# Patient Record
Sex: FEMALE
[De-identification: names, ages, dates, MRNs, and addresses within clinical notes are randomized per-mention and may not be internally consistent; named-entity substitution may affect disease eponyms.]

---

## 2019-03-01 PROBLEM — Z00.00 ENCOUNTER FOR PREVENTIVE HEALTH EXAMINATION: Status: ACTIVE | Noted: 2019-03-01

## 2019-10-07 ENCOUNTER — RESULT REVIEW (OUTPATIENT)
Age: 69
End: 2019-10-07

## 2019-11-21 ENCOUNTER — RESULT REVIEW (OUTPATIENT)
Age: 69
End: 2019-11-21

## 2020-10-29 ENCOUNTER — RESULT REVIEW (OUTPATIENT)
Age: 70
End: 2020-10-29

## 2021-09-21 ENCOUNTER — LABORATORY RESULT (OUTPATIENT)
Age: 71
End: 2021-09-21

## 2021-09-21 ENCOUNTER — APPOINTMENT (OUTPATIENT)
Dept: OTOLARYNGOLOGY | Facility: CLINIC | Age: 71
End: 2021-09-21
Payer: MEDICARE

## 2021-09-21 DIAGNOSIS — Z78.9 OTHER SPECIFIED HEALTH STATUS: ICD-10-CM

## 2021-09-21 DIAGNOSIS — Z83.0 FAMILY HISTORY OF HUMAN IMMUNODEFICIENCY VIRUS [HIV] DISEASE: ICD-10-CM

## 2021-09-21 DIAGNOSIS — Z86.39 PERSONAL HISTORY OF OTHER ENDOCRINE, NUTRITIONAL AND METABOLIC DISEASE: ICD-10-CM

## 2021-09-21 DIAGNOSIS — Z85.3 PERSONAL HISTORY OF MALIGNANT NEOPLASM OF BREAST: ICD-10-CM

## 2021-09-21 DIAGNOSIS — Z80.0 FAMILY HISTORY OF MALIGNANT NEOPLASM OF DIGESTIVE ORGANS: ICD-10-CM

## 2021-09-21 PROCEDURE — 99204 OFFICE O/P NEW MOD 45 MIN: CPT | Mod: 25

## 2021-09-21 PROCEDURE — 69100 BIOPSY OF EXTERNAL EAR: CPT

## 2021-09-21 RX ORDER — FENOFIBRATE 48 MG/1
48 TABLET ORAL
Qty: 30 | Refills: 0 | Status: ACTIVE | COMMUNITY
Start: 2021-06-04

## 2021-09-21 RX ORDER — INSULIN GLARGINE 100 [IU]/ML
100 INJECTION, SOLUTION SUBCUTANEOUS
Qty: 15 | Refills: 0 | Status: ACTIVE | COMMUNITY
Start: 2021-08-24

## 2021-09-21 RX ORDER — LEVOTHYROXINE SODIUM 0.07 MG/1
75 TABLET ORAL
Qty: 30 | Refills: 0 | Status: ACTIVE | COMMUNITY
Start: 2021-09-10

## 2021-09-21 NOTE — PROCEDURE
[FreeTextEntry1] : Biopsy L. ear mass [FreeTextEntry2] : L. ear mass concerning for recurrent BCC vs. SCC [FreeTextEntry3] : After patient gave consent, the area of concern was anesthetized with 1% Lidocaine with 1:100K epinephrine.  A punch biopsy was performed and sent to Pathology.  Hemostasis was obtained with silver nitrate.  Patient tolerated the procedure well.

## 2021-09-21 NOTE — PHYSICAL EXAM
[de-identified] : L. external ear with significant recurrence of disease involving essentially the entire auricle and extending into the EAC. [Midline] : trachea located in midline position [Normal] : no rashes

## 2021-09-21 NOTE — HISTORY OF PRESENT ILLNESS
[de-identified] : pt is referred by Dr. Elizondo for recurrent left ear basal cell carcinoma and who completed RT 1/2020 by Dr. Tobin. pt stated was healed for one year and  pt states the left ear started draining about few months, pt was given antibiotic cream and no improved. pt c.o worsen of hearing and itching and pain radiate to the neck.  pt admits to see Dr. Tobin 2 months who may had done culture, But no recently biopsy or Ct.

## 2021-09-28 ENCOUNTER — APPOINTMENT (OUTPATIENT)
Dept: CT IMAGING | Facility: CLINIC | Age: 71
End: 2021-09-28

## 2021-10-04 ENCOUNTER — APPOINTMENT (OUTPATIENT)
Dept: CT IMAGING | Facility: CLINIC | Age: 71
End: 2021-10-04

## 2021-10-04 ENCOUNTER — OUTPATIENT (OUTPATIENT)
Dept: OUTPATIENT SERVICES | Facility: HOSPITAL | Age: 71
LOS: 1 days | End: 2021-10-04

## 2021-10-04 DIAGNOSIS — C44.219 BASAL CELL CARCINOMA OF SKIN OF LEFT EAR AND EXTERNAL AURICULAR CANAL: ICD-10-CM

## 2021-10-04 PROCEDURE — 70490 CT SOFT TISSUE NECK W/O DYE: CPT | Mod: 26

## 2021-10-08 ENCOUNTER — APPOINTMENT (OUTPATIENT)
Dept: NUCLEAR MEDICINE | Facility: CLINIC | Age: 71
End: 2021-10-08
Payer: MEDICARE

## 2021-10-08 ENCOUNTER — OUTPATIENT (OUTPATIENT)
Dept: OUTPATIENT SERVICES | Facility: HOSPITAL | Age: 71
LOS: 1 days | End: 2021-10-08

## 2021-10-08 DIAGNOSIS — C44.219 BASAL CELL CARCINOMA OF SKIN OF LEFT EAR AND EXTERNAL AURICULAR CANAL: ICD-10-CM

## 2021-10-08 PROCEDURE — 78815 PET IMAGE W/CT SKULL-THIGH: CPT | Mod: 26,PI

## 2021-10-12 ENCOUNTER — APPOINTMENT (OUTPATIENT)
Dept: OTOLARYNGOLOGY | Facility: CLINIC | Age: 71
End: 2021-10-12
Payer: MEDICARE

## 2021-10-12 VITALS
WEIGHT: 238 LBS | SYSTOLIC BLOOD PRESSURE: 144 MMHG | HEIGHT: 61 IN | BODY MASS INDEX: 44.93 KG/M2 | DIASTOLIC BLOOD PRESSURE: 65 MMHG | TEMPERATURE: 98 F | HEART RATE: 79 BPM

## 2021-10-12 PROCEDURE — 99215 OFFICE O/P EST HI 40 MIN: CPT

## 2021-10-12 NOTE — DATA REVIEWED
[de-identified] : PET/CT not read yet.  On my review, there is an avid L. auricular mass involving the L. parotid and possible LAD in the left level II/V.

## 2021-10-12 NOTE — PHYSICAL EXAM
[Midline] : trachea located in midline position [Normal] : no rashes [de-identified] : L. external ear with significant recurrence of disease involving essentially the entire auricle and extending into the EAC.

## 2021-10-12 NOTE — REASON FOR VISIT
[Subsequent Evaluation] : a subsequent evaluation for [FreeTextEntry2] : f/u for L ear basal cell carcinoma

## 2021-10-12 NOTE — HISTORY OF PRESENT ILLNESS
[de-identified] : Pt is referred by Dr. Elizondo for recurrent left ear basal cell carcinoma and who completed RT 1/2020 by Dr. Tobin. pt stated was healed for one year and pt states the left ear started draining about few months, pt was given antibiotic cream and no improved. pt c.o worsen of hearing and itching and pain radiate to the neck. She underwent a biopsy at last visit and now returns after imaging to discuss further management.

## 2021-10-12 NOTE — CONSULT LETTER
[Dear  ___] : Dear  [unfilled], [Courtesy Letter:] : I had the pleasure of seeing your patient, [unfilled], in my office today. [Please see my note below.] : Please see my note below. [Consult Closing:] : Thank you very much for allowing me to participate in the care of this patient.  If you have any questions, please do not hesitate to contact me. [Sincerely,] : Sincerely, [FreeTextEntry2] : Dr. Yoana Lopez\par [address], \[place] [address] [FreeTextEntry3] : Dev\par \par Noel Park MD FACS\par Division of Head and Neck Surgery\par Department of Otolaryngology \par [facility]\par \par  of Otolaryngology\par Assistant Professor of Surgery\par [employer] [facility]

## 2021-10-26 ENCOUNTER — APPOINTMENT (OUTPATIENT)
Dept: OTOLARYNGOLOGY | Facility: CLINIC | Age: 71
End: 2021-10-26

## 2021-10-28 ENCOUNTER — NON-APPOINTMENT (OUTPATIENT)
Age: 71
End: 2021-10-28

## 2021-10-29 ENCOUNTER — INPATIENT (INPATIENT)
Facility: HOSPITAL | Age: 71
LOS: 12 days | Discharge: NURSING, REHAB OR LONG-TERM CARE | End: 2021-11-11
Attending: OTOLARYNGOLOGY | Admitting: OTOLARYNGOLOGY
Payer: MEDICARE

## 2021-10-29 VITALS
OXYGEN SATURATION: 100 % | SYSTOLIC BLOOD PRESSURE: 179 MMHG | HEART RATE: 71 BPM | TEMPERATURE: 98 F | RESPIRATION RATE: 16 BRPM | DIASTOLIC BLOOD PRESSURE: 78 MMHG

## 2021-10-29 DIAGNOSIS — E87.5 HYPERKALEMIA: ICD-10-CM

## 2021-10-29 DIAGNOSIS — N18.4 CHRONIC KIDNEY DISEASE, STAGE 4 (SEVERE): ICD-10-CM

## 2021-10-29 DIAGNOSIS — H93.92 UNSPECIFIED DISORDER OF LEFT EAR: ICD-10-CM

## 2021-10-29 DIAGNOSIS — E87.2 ACIDOSIS: ICD-10-CM

## 2021-10-29 LAB
ALBUMIN SERPL ELPH-MCNC: 3.1 G/DL — LOW (ref 3.3–5)
ALP SERPL-CCNC: 124 U/L — HIGH (ref 40–120)
ALT FLD-CCNC: 27 U/L — SIGNIFICANT CHANGE UP (ref 4–33)
ANION GAP SERPL CALC-SCNC: 10 MMOL/L — SIGNIFICANT CHANGE UP (ref 7–14)
ANION GAP SERPL CALC-SCNC: 12 MMOL/L — SIGNIFICANT CHANGE UP (ref 7–14)
APTT BLD: 32 SEC — SIGNIFICANT CHANGE UP (ref 27–36.3)
AST SERPL-CCNC: 36 U/L — HIGH (ref 4–32)
BASOPHILS # BLD AUTO: 0.05 K/UL — SIGNIFICANT CHANGE UP (ref 0–0.2)
BASOPHILS NFR BLD AUTO: 0.5 % — SIGNIFICANT CHANGE UP (ref 0–2)
BILIRUB SERPL-MCNC: 0.3 MG/DL — SIGNIFICANT CHANGE UP (ref 0.2–1.2)
BLD GP AB SCN SERPL QL: NEGATIVE — SIGNIFICANT CHANGE UP
BUN SERPL-MCNC: 48 MG/DL — HIGH (ref 7–23)
BUN SERPL-MCNC: 52 MG/DL — HIGH (ref 7–23)
CALCIUM SERPL-MCNC: 8.4 MG/DL — SIGNIFICANT CHANGE UP (ref 8.4–10.5)
CALCIUM SERPL-MCNC: 9.7 MG/DL — SIGNIFICANT CHANGE UP (ref 8.4–10.5)
CHLORIDE SERPL-SCNC: 100 MMOL/L — SIGNIFICANT CHANGE UP (ref 98–107)
CHLORIDE SERPL-SCNC: 105 MMOL/L — SIGNIFICANT CHANGE UP (ref 98–107)
CO2 SERPL-SCNC: 19 MMOL/L — LOW (ref 22–31)
CO2 SERPL-SCNC: 20 MMOL/L — LOW (ref 22–31)
CREAT SERPL-MCNC: 2.45 MG/DL — HIGH (ref 0.5–1.3)
CREAT SERPL-MCNC: 2.63 MG/DL — HIGH (ref 0.5–1.3)
EOSINOPHIL # BLD AUTO: 0.46 K/UL — SIGNIFICANT CHANGE UP (ref 0–0.5)
EOSINOPHIL NFR BLD AUTO: 4.7 % — SIGNIFICANT CHANGE UP (ref 0–6)
GAS PNL BLDV: SIGNIFICANT CHANGE UP
GLUCOSE BLDC GLUCOMTR-MCNC: 255 MG/DL — HIGH (ref 70–99)
GLUCOSE BLDC GLUCOMTR-MCNC: 335 MG/DL — HIGH (ref 70–99)
GLUCOSE BLDC GLUCOMTR-MCNC: 391 MG/DL — HIGH (ref 70–99)
GLUCOSE SERPL-MCNC: 227 MG/DL — HIGH (ref 70–99)
GLUCOSE SERPL-MCNC: 229 MG/DL — HIGH (ref 70–99)
HCT VFR BLD CALC: 31.8 % — LOW (ref 34.5–45)
HGB BLD-MCNC: 9.6 G/DL — LOW (ref 11.5–15.5)
IANC: 6.63 K/UL — SIGNIFICANT CHANGE UP (ref 1.5–8.5)
IMM GRANULOCYTES NFR BLD AUTO: 0.6 % — SIGNIFICANT CHANGE UP (ref 0–1.5)
INR BLD: 0.99 RATIO — SIGNIFICANT CHANGE UP (ref 0.88–1.16)
LYMPHOCYTES # BLD AUTO: 1.88 K/UL — SIGNIFICANT CHANGE UP (ref 1–3.3)
LYMPHOCYTES # BLD AUTO: 19.4 % — SIGNIFICANT CHANGE UP (ref 13–44)
MCHC RBC-ENTMCNC: 27.3 PG — SIGNIFICANT CHANGE UP (ref 27–34)
MCHC RBC-ENTMCNC: 30.2 GM/DL — LOW (ref 32–36)
MCV RBC AUTO: 90.3 FL — SIGNIFICANT CHANGE UP (ref 80–100)
MONOCYTES # BLD AUTO: 0.62 K/UL — SIGNIFICANT CHANGE UP (ref 0–0.9)
MONOCYTES NFR BLD AUTO: 6.4 % — SIGNIFICANT CHANGE UP (ref 2–14)
NEUTROPHILS # BLD AUTO: 6.63 K/UL — SIGNIFICANT CHANGE UP (ref 1.8–7.4)
NEUTROPHILS NFR BLD AUTO: 68.4 % — SIGNIFICANT CHANGE UP (ref 43–77)
NRBC # BLD: 0 /100 WBCS — SIGNIFICANT CHANGE UP
NRBC # FLD: 0 K/UL — SIGNIFICANT CHANGE UP
PLATELET # BLD AUTO: 262 K/UL — SIGNIFICANT CHANGE UP (ref 150–400)
POTASSIUM SERPL-MCNC: 5.6 MMOL/L — HIGH (ref 3.5–5.3)
POTASSIUM SERPL-MCNC: 6.5 MMOL/L — CRITICAL HIGH (ref 3.5–5.3)
POTASSIUM SERPL-SCNC: 5.6 MMOL/L — HIGH (ref 3.5–5.3)
POTASSIUM SERPL-SCNC: 6.5 MMOL/L — CRITICAL HIGH (ref 3.5–5.3)
PROT SERPL-MCNC: 7 G/DL — SIGNIFICANT CHANGE UP (ref 6–8.3)
PROTHROM AB SERPL-ACNC: 11.4 SEC — SIGNIFICANT CHANGE UP (ref 10.6–13.6)
RBC # BLD: 3.52 M/UL — LOW (ref 3.8–5.2)
RBC # FLD: 13.6 % — SIGNIFICANT CHANGE UP (ref 10.3–14.5)
RH IG SCN BLD-IMP: POSITIVE — SIGNIFICANT CHANGE UP
SARS-COV-2 RNA SPEC QL NAA+PROBE: SIGNIFICANT CHANGE UP
SODIUM SERPL-SCNC: 132 MMOL/L — LOW (ref 135–145)
SODIUM SERPL-SCNC: 134 MMOL/L — LOW (ref 135–145)
WBC # BLD: 9.7 K/UL — SIGNIFICANT CHANGE UP (ref 3.8–10.5)
WBC # FLD AUTO: 9.7 K/UL — SIGNIFICANT CHANGE UP (ref 3.8–10.5)

## 2021-10-29 PROCEDURE — 99285 EMERGENCY DEPT VISIT HI MDM: CPT | Mod: 25

## 2021-10-29 PROCEDURE — 99053 MED SERV 10PM-8AM 24 HR FAC: CPT

## 2021-10-29 PROCEDURE — 93010 ELECTROCARDIOGRAM REPORT: CPT

## 2021-10-29 PROCEDURE — 99223 1ST HOSP IP/OBS HIGH 75: CPT

## 2021-10-29 PROCEDURE — 99223 1ST HOSP IP/OBS HIGH 75: CPT | Mod: GC

## 2021-10-29 RX ORDER — CALCIUM GLUCONATE 100 MG/ML
2 VIAL (ML) INTRAVENOUS ONCE
Refills: 0 | Status: DISCONTINUED | OUTPATIENT
Start: 2021-10-29 | End: 2021-10-29

## 2021-10-29 RX ORDER — ATORVASTATIN CALCIUM 80 MG/1
40 TABLET, FILM COATED ORAL AT BEDTIME
Refills: 0 | Status: DISCONTINUED | OUTPATIENT
Start: 2021-10-29 | End: 2021-11-11

## 2021-10-29 RX ORDER — SODIUM CHLORIDE 9 MG/ML
1000 INJECTION, SOLUTION INTRAVENOUS
Refills: 0 | Status: DISCONTINUED | OUTPATIENT
Start: 2021-10-29 | End: 2021-11-05

## 2021-10-29 RX ORDER — DEXTROSE 50 % IN WATER 50 %
25 SYRINGE (ML) INTRAVENOUS ONCE
Refills: 0 | Status: DISCONTINUED | OUTPATIENT
Start: 2021-10-29 | End: 2021-11-11

## 2021-10-29 RX ORDER — GLUCAGON INJECTION, SOLUTION 0.5 MG/.1ML
1 INJECTION, SOLUTION SUBCUTANEOUS ONCE
Refills: 0 | Status: DISCONTINUED | OUTPATIENT
Start: 2021-10-29 | End: 2021-11-05

## 2021-10-29 RX ORDER — HYDRALAZINE HCL 50 MG
50 TABLET ORAL THREE TIMES A DAY
Refills: 0 | Status: DISCONTINUED | OUTPATIENT
Start: 2021-10-29 | End: 2021-11-04

## 2021-10-29 RX ORDER — DEXTROSE 50 % IN WATER 50 %
12.5 SYRINGE (ML) INTRAVENOUS ONCE
Refills: 0 | Status: DISCONTINUED | OUTPATIENT
Start: 2021-10-29 | End: 2021-11-11

## 2021-10-29 RX ORDER — CEFEPIME 1 G/1
2000 INJECTION, POWDER, FOR SOLUTION INTRAMUSCULAR; INTRAVENOUS EVERY 12 HOURS
Refills: 0 | Status: DISCONTINUED | OUTPATIENT
Start: 2021-10-29 | End: 2021-11-01

## 2021-10-29 RX ORDER — SODIUM BICARBONATE 1 MEQ/ML
325 SYRINGE (ML) INTRAVENOUS EVERY 12 HOURS
Refills: 0 | Status: DISCONTINUED | OUTPATIENT
Start: 2021-10-29 | End: 2021-10-29

## 2021-10-29 RX ORDER — INSULIN LISPRO 100/ML
VIAL (ML) SUBCUTANEOUS
Refills: 0 | Status: DISCONTINUED | OUTPATIENT
Start: 2021-10-29 | End: 2021-11-04

## 2021-10-29 RX ORDER — HEPARIN SODIUM 5000 [USP'U]/ML
5000 INJECTION INTRAVENOUS; SUBCUTANEOUS EVERY 12 HOURS
Refills: 0 | Status: DISCONTINUED | OUTPATIENT
Start: 2021-10-29 | End: 2021-11-05

## 2021-10-29 RX ORDER — INFLUENZA VIRUS VACCINE 15; 15; 15; 15 UG/.5ML; UG/.5ML; UG/.5ML; UG/.5ML
0.7 SUSPENSION INTRAMUSCULAR ONCE
Refills: 0 | Status: DISCONTINUED | OUTPATIENT
Start: 2021-10-29 | End: 2021-11-11

## 2021-10-29 RX ORDER — VANCOMYCIN HCL 1 G
1500 VIAL (EA) INTRAVENOUS ONCE
Refills: 0 | Status: COMPLETED | OUTPATIENT
Start: 2021-10-29 | End: 2021-10-29

## 2021-10-29 RX ORDER — INSULIN LISPRO 100/ML
5 VIAL (ML) SUBCUTANEOUS ONCE
Refills: 0 | Status: DISCONTINUED | OUTPATIENT
Start: 2021-10-29 | End: 2021-10-29

## 2021-10-29 RX ORDER — INSULIN GLARGINE 100 [IU]/ML
10 INJECTION, SOLUTION SUBCUTANEOUS AT BEDTIME
Refills: 0 | Status: DISCONTINUED | OUTPATIENT
Start: 2021-10-29 | End: 2021-10-30

## 2021-10-29 RX ORDER — INSULIN LISPRO 100/ML
VIAL (ML) SUBCUTANEOUS AT BEDTIME
Refills: 0 | Status: DISCONTINUED | OUTPATIENT
Start: 2021-10-29 | End: 2021-11-04

## 2021-10-29 RX ORDER — ACETAMINOPHEN 500 MG
650 TABLET ORAL EVERY 6 HOURS
Refills: 0 | Status: DISCONTINUED | OUTPATIENT
Start: 2021-10-29 | End: 2021-11-04

## 2021-10-29 RX ORDER — INSULIN LISPRO 100/ML
3 VIAL (ML) SUBCUTANEOUS
Refills: 0 | Status: DISCONTINUED | OUTPATIENT
Start: 2021-10-29 | End: 2021-10-30

## 2021-10-29 RX ORDER — CALCIUM GLUCONATE 100 MG/ML
1 VIAL (ML) INTRAVENOUS
Refills: 0 | Status: COMPLETED | OUTPATIENT
Start: 2021-10-29 | End: 2021-10-29

## 2021-10-29 RX ORDER — SODIUM ZIRCONIUM CYCLOSILICATE 10 G/10G
10 POWDER, FOR SUSPENSION ORAL ONCE
Refills: 0 | Status: COMPLETED | OUTPATIENT
Start: 2021-10-29 | End: 2021-10-29

## 2021-10-29 RX ORDER — SODIUM BICARBONATE 1 MEQ/ML
325 SYRINGE (ML) INTRAVENOUS
Refills: 0 | Status: DISCONTINUED | OUTPATIENT
Start: 2021-10-29 | End: 2021-11-11

## 2021-10-29 RX ORDER — DEXTROSE 50 % IN WATER 50 %
15 SYRINGE (ML) INTRAVENOUS ONCE
Refills: 0 | Status: DISCONTINUED | OUTPATIENT
Start: 2021-10-29 | End: 2021-11-05

## 2021-10-29 RX ORDER — LEVOTHYROXINE SODIUM 125 MCG
75 TABLET ORAL DAILY
Refills: 0 | Status: DISCONTINUED | OUTPATIENT
Start: 2021-10-29 | End: 2021-11-04

## 2021-10-29 RX ADMIN — INSULIN GLARGINE 10 UNIT(S): 100 INJECTION, SOLUTION SUBCUTANEOUS at 21:48

## 2021-10-29 RX ADMIN — SODIUM ZIRCONIUM CYCLOSILICATE 10 GRAM(S): 10 POWDER, FOR SUSPENSION ORAL at 18:45

## 2021-10-29 RX ADMIN — Medication 325 MILLIGRAM(S): at 18:45

## 2021-10-29 RX ADMIN — HEPARIN SODIUM 5000 UNIT(S): 5000 INJECTION INTRAVENOUS; SUBCUTANEOUS at 18:09

## 2021-10-29 RX ADMIN — Medication 2: at 21:51

## 2021-10-29 RX ADMIN — Medication 650 MILLIGRAM(S): at 14:08

## 2021-10-29 RX ADMIN — Medication 50 MILLIGRAM(S): at 21:48

## 2021-10-29 RX ADMIN — Medication 100 GRAM(S): at 18:09

## 2021-10-29 RX ADMIN — Medication 300 MILLIGRAM(S): at 21:47

## 2021-10-29 RX ADMIN — ATORVASTATIN CALCIUM 40 MILLIGRAM(S): 80 TABLET, FILM COATED ORAL at 21:47

## 2021-10-29 RX ADMIN — Medication 650 MILLIGRAM(S): at 13:38

## 2021-10-29 RX ADMIN — Medication 50 MILLIGRAM(S): at 13:38

## 2021-10-29 RX ADMIN — Medication 100 GRAM(S): at 19:14

## 2021-10-29 NOTE — CONSULT NOTE ADULT - PROBLEM SELECTOR RECOMMENDATION 9
Pt. with CKD 4, uncertain etiology, likely from long standing DM and HTN. Saw nephrologist prior to admission as outpt. Noted to have Scr of 1.57 on 9/24/15, then Scr progressively increased over the past years, last Scr prior to admission was 2.17 on 9/16/19. Scr was 2.63 on admission on 10/29/21, and repeat was 2.45 later today. Monitor labs and urine output. Avoid nephrotoxin meds such as NSAIDS, PPI, ACEI/ARB, and contrast agents. Dose meds as per GFR. Pt. with advanced CKD stage 4 in setting of longstanding DM and HTN. Scr elevated/stable at 2.45 today. Monitor labs and urine output. Avoid any potential nephrotoxins. Discontinue ACE-I. Dose meds as per eGFR

## 2021-10-29 NOTE — ED ADULT NURSE NOTE - CHIEF COMPLAINT QUOTE
Pt received as a transfer from [facility] for ENT eval. Pt states she noticed a pimple  in her ear and it progressively got worse over the month. Pt was admitted to [facility] for syncope. Pt states her left side of face feels swollen and has a headache. No complaints of chest pain, headache, nausea, dizziness, vomiting  SOB, fever, chills verbalized. Pt received with a 22g iv to right arm with zosyn infusing.

## 2021-10-29 NOTE — ED PROVIDER NOTE - PHYSICAL EXAMINATION
Gen: Alert, NAD  Head: EOMI, normal lids/conjunctiva  ENT:  patent oropharynx without erythema/exudate; fungating left ear mass w/ necrosed tissue in pinna, and foul smelling purulent drainage  Neck: +supple, no tenderness/meningismus/JVD, +Trachea midline  Chest: no chest wall tenderness, equal chest rise  Pulm: Bilateral BS, normal resp effort, no wheeze/stridor/retractions  CV: RRR, no M/R/G, +dist pulses  Abd: +BS, soft, NT/ND  Rectal: deferred  Mskel: no edema/erythema/cyanosis  Skin: no rash  Neuro: AAOx3

## 2021-10-29 NOTE — H&P ADULT - ASSESSMENT
71F h/o L ear SCC p/w fungating and purulent ear mass, admit to ENT for OR 11/3, plan for lateral temporal bone resection with Dr. Park and Dr. Gil.   -Pain/Nausea control  -Diet regular  -DVT ppx/SCDs  -OOBA  -IS  -MAURI  -cards clearance  -needs consent

## 2021-10-29 NOTE — ED ADULT TRIAGE NOTE - CHIEF COMPLAINT QUOTE
Pt received as a transfer from [facility] for ENT eval. Pt states she noticed a pimple  in her ear and it progressively got worse over the month. Pt was admitted to [facility] for syncope. Pt states her left side of face feels swollen and has a headache. No complaints of chest pain, headache, nausea, dizziness, vomiting  SOB, fever, chills verbalized. Pt received with a 22g iv to right arm with zosyn infusing.

## 2021-10-29 NOTE — ED PROVIDER NOTE - CLINICAL SUMMARY MEDICAL DECISION MAKING FREE TEXT BOX
70yo F w/ pmh as above transferred from OSH for ENT eval of left ear malignancy.   ENT consulted, labs ordered, pt TBA.

## 2021-10-29 NOTE — CONSULT NOTE ADULT - PROBLEM SELECTOR RECOMMENDATION 3
In setting of CKD. Serum CO2 is 19 today. Recommend to tart on sodium bicarb 650 mg PO BID. Monitor serum CO2.    If any questions, please feel free to contact me     Jeronimo Canales  Nephrology Fellow  [facility] Pager: [phone]  [facility] Pager: [phone] In setting of CKD. Serum CO2 is 19 today. Recommend to tart on sodium bicarb 325 mg PO BID. Monitor serum CO2.    If any questions, please feel free to contact me     Jeronimo Canales  Nephrology Fellow  [facility] Pager: [phone]  [facility] Pager: [phone] Pt. with metabolic acidosis in setting of CKD. Serum CO2 low at 19 today. Add oral sodium bicarbonate 325 mg PO BID. Monitor serum CO2.    If any questions, please feel free to contact me     Jeronimo Canales  Nephrology Fellow  [facility] Pager: [phone]  [facility] Pager: [phone]

## 2021-10-29 NOTE — ED PROVIDER NOTE - PROGRESS NOTE DETAILS
Discussed case with ENT; recommends admission to medicine for medical clearance prior to OR for left ear malignancy Dr. Maria: Pt signed out to me by Dr. Good. Pt pending repeat K for hemolysis and ENT admission. Naun Hernandez MD. ekg w/o t wave cahnges or qrs widening. spoke w/ ent, admitted to their service

## 2021-10-29 NOTE — CONSULT NOTE ADULT - SUBJECTIVE AND OBJECTIVE BOX
Patient is a 71y old  Female who presents with a chief complaint of     HPI:  72yo F w/ pmh inclusive of htn, hcl, DM, CKD, breast ca, being treated outpt by ENT Dr. Gil for left ear malignancy, transferred from [facility] for ENT consult of Left ear infection. Pt was admitted at [facility] on 10/24 after experiencing syncopal episode and after extensive workup there, is now transferred here for ENT mngmt of Left ear malignancy as pt's ENT is out of [facility]. Pt reporting increased "burning" pain of left ear w/ increased foul smelling drainage from left ear lesion. No recent fever/chills, No photophobia/eye pain/changes in vision, no sore throat/dysphagia, No chest pain/palpitations, no SOB/cough/wheeze/stridor, No abdominal pain, No N/V/D, no dysuria/frequency/discharge, No neck/back pain, no rash, no new focal neuro symptoms. (29 Oct 2021 09:47)        Allergies  No Known Allergies    Intolerances    HOME MEDICATIONS: Patient unable to remember home medications, unsure of which pharmacy she uses, ?[facility], medication list from [facility] reviewed:   hydralazine 50 mg tid  lisinopril 2.5 mg  synthroid 75 mcg  lipitor 40 mg  tylenol prn  HSQ    MEDICATIONS  (STANDING):    MEDICATIONS  (PRN):  acetaminophen     Tablet .. 650 milliGRAM(s) Oral every 6 hours PRN Mild Pain (1 - 3)      PAST MEDICAL & SURGICAL HISTORY:  Cancer of ear  DM2 on insulin therapy  HTN  HLD  CKD (stage IV?)  L breast cancer s/p mastectomy  Hx falls    SOCIAL HISTORY:  Denies tobacco use/alcohol use/illict drug abuse. Son smokes, but patient denies second hand smoke exposure   w/ children    FAMILY HISTORY:  Father -  throat cancer  Mother/Father/brother - diabetes (all )    REVIEW OF SYSTEMS:    CONSTITUTIONAL:   EYES:  ENMT:   NECK:   BREASTS:   RESPIRATORY:   CARDIOVASCULAR:   GASTROINTESTINAL:   GENITOURINARY:  NEUROLOGICAL:   SKIN:   LYMPH NODES:   ENDOCRINE:   MUSCULOSKELETAL:   PSYCHIATRIC:   HEME/LYMPH:   ALLERGY AND IMMUNOLOGIC:     [] Unable to obtain due to poor mental status    Vital Signs Last 24 Hrs  T(C): 36.3 (29 Oct 2021 10:40), Max: 36.6 (29 Oct 2021 00:12)  T(F): 97.4 (29 Oct 2021 10:40), Max: 97.9 (29 Oct 2021 00:12)  HR: 66 (29 Oct 2021 10:40) (66 - 71)  BP: 169/76 (29 Oct 2021 10:40) (169/76 - 189/83)  RR: 18 (29 Oct 2021 10:40) (16 - 20)  SpO2: 100% (29 Oct 2021 10:40) (100% - 100%)  CAPILLARY BLOOD GLUCOSE    PHYSICAL EXAM:    GENERAL:   HEAD:    EYES:  ENMT:   NECK:   RESPIRATORY:   CARDIOVASCULAR:   GASTROINTESTINAL:   BACK:   GENITOURINARY:   EXTREMITIES:    NERVOUS SYSTEM:    PSYCH:   HEME/LYMPH:  SKIN:     LABS:                        9.6    9.70  )-----------( 262      ( 29 Oct 2021 04:57 )             31.8     10    134<L>  |  105  |  48<H>  ----------------------------<  227<H>  5.6<H>   |  19<L>  |  2.45<H>    Ca    8.4      29 Oct 2021 07:36    K 5.6 moderately hemolyzed    TPro  7.0  /  Alb  3.1<L>  /  TBili  0.3  /  DBili  x   /  AST  36<H>  /  ALT  27  /  AlkPhos  124<H>  10-29    PT/INR - ( 29 Oct 2021 05:01 )   PT: 11.4 sec;   INR: 0.99 ratio    PTT - ( 29 Oct 2021 05:01 )  PTT:32.0 sec    CAPILLARY BLOOD GLUCOSE    RADIOLOGY & ADDITIONAL STUDIES:    Imaging:   Personally Reviewed:  [ ] YES   < from: CT Neck Soft Tissue No Cont (10.04.21 @ 07:59) >  FINDINGS:    There is diffuse enlargement and soft tissue thickening of the left pinna with infiltration of the deep fat planes and adjacent skin thickening. Soft tissue thickening is inseparable from the superior margin of the parotid gland Abnormal soft tissue infiltrates the cartilaginous and bony external auditory canal producing near complete luminal obliteration. No bony erosion is seen. No abnormal soft tissue is seen medial to the tympanic membrane.  AERODIGESTIVE TRACT:  Normal non-contrast appearance.  LYMPH NODES:  Multiple prominent sized as well as abnormal appearing and enlarged left-sided cervical lymph nodes are seen. Representative lymph nodes are as follows:  Left level II (2:63) 1.2 x 1.2 cm rounded node.  Left level III (2:71) rounded node 1.2 x 1 cm.  Left level V (2:72) 1.5 x 1.4 cm.    No enlarged right sided lymph nodes are seen.  PAROTID GLANDS:  The left parotid gland is unremarkable more inferiorly in the region of the parotid tail. The right parotid gland is normal in appearance.  SUBMANDIBULAR GLANDS:  Normal non-contrast appearance.  THYROID GLAND:  9 mm right lobe nodule.  VISUALIZED PARANASAL SINUSES:  Clear.  VISUALIZED TYMPANOMASTOID CAVITIES: Nonspecific soft tissue within the left mastoid cavity. No bone erosion is seen.  BONES:  Cervical spondylosis.  MISCELLANEOUS:  None.    IMPRESSION:  Findings compatible with diffuse tumor infiltration of the leftpinna and external auditory canal with evidence of adjacent deep soft tissue infiltration and possible involvement of the superior parotid gland.    No evidence of temporal bone cortical erosion/tumor invasion.    Multiple enlarged/abnormal appearingleft cervical lymph nodes strongly suspicious for gracia metastases.    < end of copied text >    < from: NM PET/CT Onc FDG Skull to Thigh, Inital (10.08.21 @ 15:43) >    IMPRESSION:    1.  Intensely FDG avid soft tissue thickening in the left ureter involving the pinna, pre- and post auricular regions and extending into the external auditory canal system with known squamous cell carcinoma.    2.  FDG avid left cervical level II-IV/supraclavicular lymph nodes, suspicious for metastatic disease.    3.  Leiomyomatous uterus with a small central focus of uterine FDG avidity, possibly in the endometrial cavity, indeterminate. Suggest further evaluation with ultrasound or MRI.    4.  Small mildly FDG avid right axillary lymph nodes are nonspecific, possibly reactive as patient reports administration of Covid 19 vaccination in the right arm in 2021. Consider ultrasound for further evaluation as indicated.    5.  Non-FDG avid 1.5 cm right thyroid nodule. Suggest ultrasound for further evaluation.    < end of copied text >    Echocardiogram (done 10/25/21 at [facility]): EF 60-65%, no wall motion abnormality    Carotid duplex (done 10/25/21 at [facility]): no hemodynamically significant stenosis              EKG:   Personally Reviewed:  [ ] YES   No EKG in physical chart  Per ED note: EKG was reported to be NSR 74 bpm,  ms, qtc 435 ms, no stemi, no t wave changes    Consultant(s) notes reviewed:  ENT, records from [facility], Otpt records via [facility]  Care Discussed with Consultant(s)/Other Providers: ENT @ [id number] re: pre-op assessment, diet changes (ordered), adding in BP meds, insulin regimen. Would benefit from renal followup.   Care Discussed with Primary Team.      [] Increased delirium risk  [] Delirium and other risks can be reduced by:          -early ambulation          -minimizing "tethers" - IV, oxygen, catheters, etc          -avoiding hypnotics and sedatives          -maintaining hydration/nutrition          -avoid anticholinergics - diphenhydramine, etc          -pain control          -supportive environment   Patient is a 71y old  Female who presents with a chief complaint of     HPI:  72yo F w/ HTN, HLD, DM2 on long term insulin therapy, CKD (stage IV?), L breast ca s/p mastectomy, being treated outpt by ENT Dr. Gil for left ear malignancy (SCC), initially presented to [facility] for syncope, s/p workup, noted to have concern for L ear infection, transferred to [facility] for further management of Left ear malignancy. Pt reporting increased "burning" pain of left ear w/ increased foul smelling drainage from left ear lesion. Recent imaging with concern for tumor infiltration of pinna and external auditory canal, PET scan w/ FDG avid cervical lymph nodes, concern for metastatic disease, currently being planned for resection by ENT.     Patient reports pain in L ear, reports drainage was worsening despite outpatient treatment w/ antibiotics, is aware of surgical plan. In terms of her chronic medical conditions, she is unsure what meds she takes for blood pressure, has blurry vision chronically (uses glasses, without acute change), but no headache/CP/SOB/GARCIA.  She is not aware she has "kidney disease" but reports her PMD had referred her to a nephrologist (who she saw once), reports urinating frequently. Is not aware of having any issues with her thyroid (is currently on synthroid). Reports breast cancer was a long time ago.     Allergies  No Known Allergies    Intolerances    HOME MEDICATIONS: Patient unable to remember home medications, unsure of which pharmacy she uses, ?[facility], medication list from [facility] reviewed:   hydralazine 50 mg tid  lisinopril 2.5 mg  synthroid 75 mcg  lipitor 40 mg  tylenol prn  HSQ    MEDICATIONS  (STANDING):    MEDICATIONS  (PRN):  acetaminophen     Tablet .. 650 milliGRAM(s) Oral every 6 hours PRN Mild Pain (1 - 3)      PAST MEDICAL & SURGICAL HISTORY:  Cancer of ear  DM2 on insulin therapy  HTN  HLD  CKD (stage IV?)  L breast cancer s/p mastectomy (~30 years ago)  Hx falls    SOCIAL HISTORY:  Denies tobacco use/alcohol use/illict drug abuse. Son smokes, but patient denies second hand smoke exposure   w/ children    FAMILY HISTORY:  Father -  throat cancer  Mother/Father/brother - diabetes (all )    REVIEW OF SYSTEMS:    CONSTITUTIONAL: no fever/chills, +increased sleepiness  EYES: no eye pain, +chronic blurry vision requiring use of glasses  ENMT: denies painful swallowing or trouble swallowing, denies hearing loss, +L ear pain and discharge  NECK: no neck pain/stiffness  BREASTS: hx L breast cancer  RESPIRATORY: no cough/SOB, denies hx of ZOILA  CARDIOVASCULAR: no CP/GARCIA, reports hx of LE edema (dependent)  GASTROINTESTINAL: no N/V/ab pain, +constipation, no melena/hematochezia  GENITOURINARY: no dysuria, Occasional incontinence (unable to make it to toilet on time)  NEUROLOGICAL: no headache, no focal weakness or loss of sensation, +gait instability (uses cane)  SKIN: L ear with discharge/pain  ENDOCRINE: diabetes injects self with insulin (unsure amount, thinks 20U), denies knowledge of thyroid disease but on synthroid at [facility]  MUSCULOSKELETAL: no joint pain/swelling  PSYCHIATRIC: no issues  HEME/LYMPH: no easy bleeding/bruising  ALLERGY AND IMMUNOLOGIC: NKDA    [] Unable to obtain due to poor mental status    Vital Signs Last 24 Hrs  T(C): 36.3 (29 Oct 2021 10:40), Max: 36.6 (29 Oct 2021 00:12)  T(F): 97.4 (29 Oct 2021 10:40), Max: 97.9 (29 Oct 2021 00:12)  HR: 66 (29 Oct 2021 10:40) (66 - 71)  BP: 169/76 (29 Oct 2021 10:40) (169/76 - 189/83)  RR: 18 (29 Oct 2021 10:40) (16 - 20)  SpO2: 100% (29 Oct 2021 10:40) (100% - 100%)  CAPILLARY BLOOD GLUCOSE    PHYSICAL EXAM:    GENERAL: Obese female, lying in bed, in no acute distress  HEAD:  NC/AT  EYES: EOMI, clear sclera/conjunctiva  ENMT: +L ear fungating mass, necrotic tissue, +purulent drainage, some erythema of cheek, MMM  NECK: supple, non JVD  RESPIRATORY: CTAB, comfortable on RA, speaking in full sentences  CARDIOVASCULAR: S1S2 RRR  GASTROINTESTINAL: obese, soft, non-tender +BS  GENITOURINARY: no baptiste  EXTREMITIES:  no c/c/e  NERVOUS SYSTEM:  moving all extremities, SILT grossly, non-focal  PSYCH: alert, calm, pleaseant  SKIN: L ear lesion as above    LABS:                        9.6    9.70  )-----------( 262      ( 29 Oct 2021 04:57 )             31.8     10    134<L>  |  105  |  48<H>  ----------------------------<  227<H>  5.6<H>   |  19<L>  |  2.45<H>    Ca    8.4      29 Oct 2021 07:36    K 5.6 moderately hemolyzed    TPro  7.0  /  Alb  3.1<L>  /  TBili  0.3  /  DBili  x   /  AST  36<H>  /  ALT  27  /  AlkPhos  124<H>  10-    PT/INR - ( 29 Oct 2021 05:01 )   PT: 11.4 sec;   INR: 0.99 ratio    PTT - ( 29 Oct 2021 05:01 )  PTT:32.0 sec    CAPILLARY BLOOD GLUCOSE    RADIOLOGY & ADDITIONAL STUDIES:    Imaging:   Personally Reviewed:  [ x] YES   < from: CT Neck Soft Tissue No Cont (10.04.21 @ 07:59) >  FINDINGS:    There is diffuse enlargement and soft tissue thickening of the left pinna with infiltration of the deep fat planes and adjacent skin thickening. Soft tissue thickening is inseparable from the superior margin of the parotid gland Abnormal soft tissue infiltrates the cartilaginous and bony external auditory canal producing near complete luminal obliteration. No bony erosion is seen. No abnormal soft tissue is seen medial to the tympanic membrane.  AERODIGESTIVE TRACT:  Normal non-contrast appearance.  LYMPH NODES:  Multiple prominent sized as well as abnormal appearing and enlarged left-sided cervical lymph nodes are seen. Representative lymph nodes are as follows:  Left level II (2:63) 1.2 x 1.2 cm rounded node.  Left level III (2:71) rounded node 1.2 x 1 cm.  Left level V (2:72) 1.5 x 1.4 cm.    No enlarged right sided lymph nodes are seen.  PAROTID GLANDS:  The left parotid gland is unremarkable more inferiorly in the region of the parotid tail. The right parotid gland is normal in appearance.  SUBMANDIBULAR GLANDS:  Normal non-contrast appearance.  THYROID GLAND:  9 mm right lobe nodule.  VISUALIZED PARANASAL SINUSES:  Clear.  VISUALIZED TYMPANOMASTOID CAVITIES: Nonspecific soft tissue within the left mastoid cavity. No bone erosion is seen.  BONES:  Cervical spondylosis.  MISCELLANEOUS:  None.    IMPRESSION:  Findings compatible with diffuse tumor infiltration of the left pinna and external auditory canal with evidence of adjacent deep soft tissue infiltration and possible involvement of the superior parotid gland.    No evidence of temporal bone cortical erosion/tumor invasion.    Multiple enlarged/abnormal appearingleft cervical lymph nodes strongly suspicious for gracia metastases.    < end of copied text >    < from: NM PET/CT Onc FDG Skull to Thigh, Inital (10.08.21 @ 15:43) >    IMPRESSION:    1.  Intensely FDG avid soft tissue thickening in the left ureter involving the pinna, pre- and post auricular regions and extending into the external auditory canal system with known squamous cell carcinoma.    2.  FDG avid left cervical level II-IV/supraclavicular lymph nodes, suspicious for metastatic disease.    3.  Leiomyomatous uterus with a small central focus of uterine FDG avidity, possibly in the endometrial cavity, indeterminate. Suggest further evaluation with ultrasound or MRI.    4.  Small mildly FDG avid right axillary lymph nodes are nonspecific, possibly reactive as patient reports administration of Covid 19 vaccination in the right arm in 2021. Consider ultrasound for further evaluation as indicated.    5.  Non-FDG avid 1.5 cm right thyroid nodule. Suggest ultrasound for further evaluation.    < end of copied text >    Studies from [facility]:  Echocardiogram (done 10/25/21 at [facility]): EF 60-65%, no wall motion abnormality    Carotid duplex (done 10/25/21 at [facility]): no hemodynamically significant stenosis              EKG:   Personally Reviewed:  [ ] YES   No EKG in physical chart  Per ED note: EKG was reported to be NSR 74 bpm,  ms, qtc 435 ms, no stemi, no t wave changes    Consultant(s) notes reviewed:  ENT, records from [facility], Otpt records via [facility]  Care Discussed with Consultant(s)/Other Providers: ENT @ [id number] re: pre-op assessment, diet changes (ordered), adding in BP meds, insulin regimen. Would benefit from renal followup.   Care Discussed with Primary Team.      [] Increased delirium risk  [] Delirium and other risks can be reduced by:          -early ambulation          -minimizing "tethers" - IV, oxygen, catheters, etc          -avoiding hypnotics and sedatives          -maintaining hydration/nutrition          -avoid anticholinergics - diphenhydramine, etc          -pain control          -supportive environment

## 2021-10-29 NOTE — CONSULT NOTE ADULT - SUBJECTIVE AND OBJECTIVE BOX
[facility] DIVISION OF KIDNEY DISEASES AND HYPERTENSION -- [phone]  -- INITIAL CONSULT NOTE  --------------------------------------------------------------------------------  HPI: Patient is a 70 y/o female with past medical history of CKD, breast cancer, DM (diagnosed at age of 28), hypertension and left ear malignancy, was initially admitted to [facility] on 10/24 for L ear infection, then transferred to [facility] on 10/29 for further ENT management. Planned for lateral temporal bone resection by ENT. Nephrology consulted for CKD management. On review of labs on [facility], patient noted to have Scr of 1.57 on 9/24/15, then Scr progressively increased over the past years, last Scr prior to admission was 2.17 on 9/16/19. Scr was 2.63 on admission on 10/29/21, and repeat was 2.45 later today.  Patient was seen and examined. Pt. said that she is aware of her kidney disease. She endorsed that she saw a nephrologist Dr Dk Rose as outpatient in [place] few months ago.     PAST HISTORY  --------------------------------------------------------------------------------  PAST MEDICAL & SURGICAL HISTORY:  Cancer of ear      FAMILY HISTORY:    PAST SOCIAL HISTORY:    ALLERGIES & MEDICATIONS  --------------------------------------------------------------------------------  Allergies    No Known Allergies    Intolerances    Standing Inpatient Medications  atorvastatin 40 milliGRAM(s) Oral at bedtime  dextrose 40% Gel 15 Gram(s) Oral once  dextrose 5%. 1000 milliLiter(s) IV Continuous <Continuous>  dextrose 5%. 1000 milliLiter(s) IV Continuous <Continuous>  dextrose 50% Injectable 25 Gram(s) IV Push once  dextrose 50% Injectable 12.5 Gram(s) IV Push once  dextrose 50% Injectable 25 Gram(s) IV Push once  glucagon  Injectable 1 milliGRAM(s) IntraMuscular once  heparin   Injectable 5000 Unit(s) SubCutaneous every 12 hours  hydrALAZINE 50 milliGRAM(s) Oral three times a day  influenza  Vaccine (HIGH DOSE) 0.7 milliLiter(s) IntraMuscular once  insulin glargine Injectable (LANTUS) 10 Unit(s) SubCutaneous at bedtime  insulin lispro (ADMELOG) corrective regimen sliding scale   SubCutaneous three times a day before meals  insulin lispro (ADMELOG) corrective regimen sliding scale   SubCutaneous at bedtime  levothyroxine 75 MICROGram(s) Oral daily    PRN Inpatient Medications  acetaminophen     Tablet .. 650 milliGRAM(s) Oral every 6 hours PRN    REVIEW OF SYSTEMS  --------------------------------------------------------------------------------  Gen: No fevers/chills  HEENT: L ear pain +   Respiratory: No dyspnea, cough  CV: No chest pain  GI: No abdominal pain, diarrhea  : No dysuria, hematuria  MSK: No  edema    All other systems were reviewed and are negative, except as noted.    VITALS/PHYSICAL EXAM  --------------------------------------------------------------------------------  T(C): 36.3 (10-29-21 @ 10:40), Max: 36.6 (10-29-21 @ 00:12)  HR: 66 (10-29-21 @ 10:40) (66 - 71)  BP: 169/76 (10-29-21 @ 10:40) (169/76 - 189/83)  RR: 18 (10-29-21 @ 10:40) (16 - 20)  SpO2: 100% (10-29-21 @ 10:40) (100% - 100%)  Wt(kg): --  Height (cm): 154.9 (10-29-21 @ 14:43)  Weight (kg): 107.955 (10-29-21 @ 14:43)  BMI (kg/m2): 45 (10-29-21 @ 14:43)  BSA (m2): 2.03 (10-29-21 @ 14:43)    Physical Exam:  	Gen: NAD  	HEENT: MMM, L ear with necrotic lesion   	Pulm: CTA B/L, no crackles   	CV: S1S2  	Abd: Soft, +BS   	Ext: No LE edema B/L  	Neuro: Awake  	Skin: Warm and dry    LABS/STUDIES  --------------------------------------------------------------------------------              9.6    9.70  >-----------<  262      [10-29-21 @ 04:57]              31.8     134  |  105  |  48  ----------------------------<  227      [10-29-21 @ 07:36]  5.6   |  19  |  2.45        Ca     8.4     [10-29-21 @ 07:36]    TPro  7.0  /  Alb  3.1  /  TBili  0.3  /  DBili  x   /  AST  36  /  ALT  27  /  AlkPhos  124  [10-29-21 @ 04:57]    PT/INR: PT 11.4 , INR 0.99       [10-29-21 @ 05:01]  PTT: 32.0       [10-29-21 @ 05:01]    Creatinine Trend:  SCr 2.45 [10-29 @ 07:36]  SCr 2.63 [10-29 @ 04:57] [facility] DIVISION OF KIDNEY DISEASES AND HYPERTENSION -- [phone]  -- INITIAL CONSULT NOTE  --------------------------------------------------------------------------------  HPI: 71-year-old female with longstanding history of DM (>40 years), HTN, breast cancer, left ear malignancy and CKD was initially admitted to [facility] on 10/24/21 for L ear infection, then transferred to [facility] on 10/29/21 for further ENT management. Planned for lateral temporal bone resection by ENT team. Nephrology consulted for CKD management. On review of labs on [facility]/[place], patient noted to have elevated Scr of 1.57 on 9/24/15. Scr progressively increased over the years and was 2.17 on 9/16/19. Scr was 2.63 earlier today (10/29/21). Repeat labs done today showed Scr level of 2.45. Pt. seen and examined today. Pt. has been aware of her kidney disease and saw Dr. Dk Rose few months ago as outpatient in [place], [place]. Pt. feels okay but gives history of left facial/ear pain.     PAST HISTORY  --------------------------------------------------------------------------------  PAST MEDICAL & SURGICAL HISTORY:  Cancer of ear    FAMILY HISTORY:    PAST SOCIAL HISTORY:    ALLERGIES & MEDICATIONS  --------------------------------------------------------------------------------  Allergies    No Known Allergies    Intolerances    Standing Inpatient Medications  atorvastatin 40 milliGRAM(s) Oral at bedtime  dextrose 40% Gel 15 Gram(s) Oral once  dextrose 5%. 1000 milliLiter(s) IV Continuous <Continuous>  dextrose 5%. 1000 milliLiter(s) IV Continuous <Continuous>  dextrose 50% Injectable 25 Gram(s) IV Push once  dextrose 50% Injectable 12.5 Gram(s) IV Push once  dextrose 50% Injectable 25 Gram(s) IV Push once  glucagon  Injectable 1 milliGRAM(s) IntraMuscular once  heparin   Injectable 5000 Unit(s) SubCutaneous every 12 hours  hydrALAZINE 50 milliGRAM(s) Oral three times a day  influenza  Vaccine (HIGH DOSE) 0.7 milliLiter(s) IntraMuscular once  insulin glargine Injectable (LANTUS) 10 Unit(s) SubCutaneous at bedtime  insulin lispro (ADMELOG) corrective regimen sliding scale   SubCutaneous three times a day before meals  insulin lispro (ADMELOG) corrective regimen sliding scale   SubCutaneous at bedtime  levothyroxine 75 MICROGram(s) Oral daily    PRN Inpatient Medications  acetaminophen     Tablet .. 650 milliGRAM(s) Oral every 6 hours PRN    REVIEW OF SYSTEMS  --------------------------------------------------------------------------------  Gen: No fevers/chills  HEENT: See HPI, L ear/facial pain +   Respiratory: No dyspnea  CV: No chest pain  GI: No abdominal pain, diarrhea  : No dysuria, hematuria  MSK: No LE edema    All other systems were reviewed and are negative, except as noted.    VITALS/PHYSICAL EXAM  --------------------------------------------------------------------------------  T(C): 36.3 (10-29-21 @ 10:40), Max: 36.6 (10-29-21 @ 00:12)  HR: 66 (10-29-21 @ 10:40) (66 - 71)  BP: 169/76 (10-29-21 @ 10:40) (169/76 - 189/83)  RR: 18 (10-29-21 @ 10:40) (16 - 20)  SpO2: 100% (10-29-21 @ 10:40) (100% - 100%)  Wt(kg): --  Height (cm): 154.9 (10-29-21 @ 14:43)  Weight (kg): 107.955 (10-29-21 @ 14:43)  BMI (kg/m2): 45 (10-29-21 @ 14:43)  BSA (m2): 2.03 (10-29-21 @ 14:43)    Physical Exam:  	Gen: resting, NAD  	HEENT: L ear with large hole and necrotic/infected lesion seen  	Pulm: CTA B/L, no crackles   	CV: S1S2+  	Abd: Soft, +BS   	Ext: No LE edema B/L  	Neuro: Awake, alert  	Skin: Warm and dry    LABS/STUDIES  --------------------------------------------------------------------------------              9.6    9.70  >-----------<  262      [10-29-21 @ 04:57]              31.8     134  |  105  |  48  ----------------------------<  227      [10-29-21 @ 07:36]  5.6   |  19  |  2.45        Ca     8.4     [10-29-21 @ 07:36]    TPro  7.0  /  Alb  3.1  /  TBili  0.3  /  DBili  x   /  AST  36  /  ALT  27  /  AlkPhos  124  [10-29-21 @ 04:57]    Creatinine Trend:  SCr 2.45 [10-29 @ 07:36]  SCr 2.63 [10-29 @ 04:57]

## 2021-10-29 NOTE — CONSULT NOTE ADULT - ASSESSMENT
72 yo obese F w/ HTN, HLD, DM2 (A1C 7.9, on insulin therapy), CKD (stage IV?), breast ca s/p L mastectomy (~30 years ago per patient), SCC of L ear, transferred from [facility] for further management of L ear malignancy, plan for surgical resection as per ENT.     # Pre-op evaluation  Patient denies active chest pain, no prior hx of ischemic heart disease, EKG reported by ED to have no ischemic changes, patient is euvolemic on exam, no hx of heart failure. She reports able to do 4 METS of activity. Echocardiogram at [facility] revealed EF 60-65%, no wall motion abnormalities. She denies history of CVA. She is diabetic on insulin therapy (A1C 7.9). Based on review of Cr trends, she likely has CKD IV. She denies hx of smoking or lung disease, is not requiring supplemental O2, and denies history of ZOILA.     RCRI 2 points (diabetes on insulin therapy, renal insufficiency - pre-op Cr >2), corresponding to Class III risk. Given absence of cardiac symptoms, able to do 4 METS and recent echo wnl, anticipate can proceed with surgery without need for further cardiac workup. Patient's blood pressure will be managed with medications, and she will be treated with insulin therapy for her diabetes. Patient would benefit from nephrology followup given her chronic kidney disease. Anticipate that patient will be optimized for OR with management of comorbid conditions as noted below:     # Essential HTN  - will c/w hydralazine 50 mg tid (was on med at [facility])  - will hold lisinopril for now (given CKD and K 5.6 hemolyzed), could consider restarting if needed for BP control if renal function stable and no hyperkalemia on repeat lab...   - goal SBP <130 given diabetes, but could consider more lenient BP control given age and reported history of falls    # DM2 c/b hyperglycemia, on insulin therapy  - A1C 7.9 at [facility]  - will start on sliding scale insulin  - patient does not recall insulin dose ?thinks maybe 20U but unsure  - will be conservative in setting of CKD, do lower dose of long acting insulin  - goal FS <180, monitor FS and titrate regimen as needed    # CKD (stage IV)  - ?setting of HTN and diabetes  - unclear if c/b anemia of renal disease (hgb 9.6) and metabolic acidosis (serum bicarb 19), patient denies any overt bleeding, could consider checking iron studies/ferritin for further anemia w/u.   - recommend renal followup, ?role for sodium bicarb supplemenation  - low potassium diet, avoid nephrotoxins, continue to monitor renal function closely  - lisinopril on hold for now pending stability as above    # hypothyroidism?  - c/w synthroid  - f/u TSH, free T4    # HLD  - c/w statin    # L ear SCC  - management as per ENT  - pain control/wound care as per ENT  - wound cultures from [facility] noted, (collected 10/25), with proteus mirabilis and pseudomonas aeruginosa defer antibiotics to ENT    # Hx falls  - reports use of cane, history of falls  - fall precautions, consider PT eval    VTE ppx: given malignancy, anticipate would benefit from pharmacological VTE ppx, would favor use of HSQ given renal dysfunction     72 yo obese F w/ HTN, HLD, DM2 (A1C 7.9, on insulin therapy), CKD (stage IV?), breast ca s/p L mastectomy (~30 years ago per patient), SCC of L ear, transferred from [facility] for further management of L ear malignancy, plan for surgical resection as per ENT.     # Pre-op evaluation  Patient denies active chest pain, no prior hx of ischemic heart disease, EKG reported by ED to have no ischemic changes, patient is euvolemic on exam, no hx of heart failure. She reports able to do 4 METS of activity. Echocardiogram at [facility] revealed EF 60-65%, no wall motion abnormalities. She denies history of CVA. She is diabetic on insulin therapy (A1C 7.9). Based on review of Cr trends, she likely has CKD IV. She denies hx of smoking or lung disease, is not requiring supplemental O2, and denies history of ZOILA.     RCRI 2 points (diabetes on insulin therapy, renal insufficiency - pre-op Cr >2), corresponding to Class III risk. Given absence of cardiac symptoms, able to do 4 METS and recent echo wnl, anticipate can proceed with surgery without need for further cardiac workup. Patient's blood pressure will be managed with medications, and she will be treated with insulin therapy for her diabetes. Patient would benefit from nephrology followup given her chronic kidney disease. Anticipate that patient will be optimized for OR with management of comorbid conditions as noted below:     # Essential HTN  - will c/w hydralazine 50 mg tid (was on med at [facility])  - will hold lisinopril for now (given CKD and K 5.6 hemolyzed), could consider restarting if needed for BP control if renal function stable and no hyperkalemia on repeat lab...   - goal SBP <130 given diabetes, but could consider more lenient BP control given age and reported history of falls    # DM2 c/b hyperglycemia, on insulin therapy  - A1C 7.9 at [facility]  - will start on sliding scale insulin  - patient does not recall insulin dose ?thinks maybe 20U but unsure  - will be conservative in setting of CKD, do lower dose of long acting insulin for now  - goal FS <180, monitor FS and titrate regimen as needed    # CKD (stage IV)  - ?setting of HTN and diabetes  - unclear if c/b anemia of renal disease (hgb 9.6) and metabolic acidosis (serum bicarb 19), patient denies any overt bleeding, could consider checking iron studies/ferritin for further anemia w/u.   - recommend renal followup, ?role for sodium bicarb supplemenation  - low potassium diet, avoid nephrotoxins, continue to monitor renal function closely  - lisinopril on hold for now pending stability as above    # hypothyroidism?  - c/w synthroid  - f/u TSH, free T4    # HLD  - c/w statin    # L ear SCC  - management as per ENT  - pain control/wound care as per ENT  - wound cultures from [facility] noted, (collected 10/25), with proteus mirabilis and pseudomonas aeruginosa, defer antibiotics to ENT    # Hx falls  - reports use of cane, history of falls  - fall precautions, consider PT eval    VTE ppx: given malignancy, anticipate would benefit from pharmacological VTE ppx, would favor use of HSQ given renal dysfunction

## 2021-10-29 NOTE — ED ADULT NURSE REASSESSMENT NOTE - NS ED NURSE REASSESS COMMENT FT1
BP elevated at 189/83. Hospitalist ENT [place]aged [place] [phone] to inform of BP, Pt also c/o headache 7/10 pain scale. awaiting return call. Report given to Brenda KEY on 8th floor.

## 2021-10-29 NOTE — H&P ADULT - HISTORY OF PRESENT ILLNESS
70yo F w/ pmh inclusive of htn, hcl, DM, CKD, breast ca, being treated outpt by ENT Dr. Gil for left ear malignancy, transferred from [facility] for ENT consult of Left ear infection. Pt was admitted at [facility] on 10/24 after experiencing syncopal episode and after extensive workup there, is now transferred here for ENT mngmt of Left ear malignancy as pt's ENT is out of [facility]. Pt reporting increased "burning" pain of left ear w/ increased foul smelling drainage from left ear lesion. No recent fever/chills, No photophobia/eye pain/changes in vision, no sore throat/dysphagia, No chest pain/palpitations, no SOB/cough/wheeze/stridor, No abdominal pain, No N/V/D, no dysuria/frequency/discharge, No neck/back pain, no rash, no new focal neuro symptoms.

## 2021-10-29 NOTE — ED PROVIDER NOTE - ATTENDING CONTRIBUTION TO CARE
History and physical above obtained and documented (or dictated) by me (attending physician).  Resident or ACP and/or medical student involved in case for assistance with disposition and may document involvement as necessary via progress note(s).   -Dr. Good

## 2021-10-29 NOTE — CONSULT NOTE ADULT - PROBLEM SELECTOR RECOMMENDATION 2
Pt. with hyperkalemia in setting of CKD, ACE-I and hyperglycemia. Last serum potassium is 5.6 (moderately hemolyzed). Recommend to repeat serum potassium. Start on sodium bicarb 650 mg PO BID. Optimize glycemic control. Discontinue ACE-I. Low potassium diet. Monitor serum potassium. Pt. with hyperkalemia in setting of CKD, ACE-I and hyperglycemia. Last serum potassium is 5.6 (moderately hemolyzed). Recommend to repeat serum potassium. Start on sodium bicarb 325 mg PO BID. Optimize glycemic control. Discontinue ACE-I. Low potassium diet. Monitor serum potassium. Pt. with hyperkalemia in setting of CKD, ACE-I, hyperglycemia and metabolic acidosis. Last serum potassium elevated at 5.6 however blood was moderately hemolyzed. Recommend to repeat serum potassium. Optimize glycemic control. Discontinue ACE-I. Low potassium diet. Monitor serum potassium

## 2021-10-29 NOTE — CHART NOTE - NSCHARTNOTEFT_GEN_A_CORE
Noted repeat K is 6.4 on VBG (not hemolyzed) and glucose of 456. Suggest to give 1 dose of Lokelma 10 g PO, with insulin regular 5 unit IVP, calcium gluconate 2 g IVPB. Obtain EKG STAT. Repeat BMP in 4 hours.     If any questions, please feel free to contact me     Jeronimo Canales  Nephrology Fellow  [facility] Pager: [phone]  [facility] Pager: [phone]

## 2021-10-29 NOTE — H&P ADULT - NSHPPHYSICALEXAM_GEN_ALL_CORE
General: NAD, A+Ox3  Respiratory: No respiratory distress, stridor, or stertor  Voice quality: normal  OU:  EOMI  AD: Pinna wnl, EAC clear, TM intact, no effusion  AS: fungating left ear mass w/ necrosed tissue in pinna, and foul smelling purulent drainage  OC/OP: tongue normal, floor of mouth WNL, no masses or lesions, OP clear  Neck: soft/flat, no LAD  Neuro: CNII-XII intact

## 2021-10-30 ENCOUNTER — RESULT REVIEW (OUTPATIENT)
Age: 71
End: 2021-10-30

## 2021-10-30 LAB
ANION GAP SERPL CALC-SCNC: 10 MMOL/L — SIGNIFICANT CHANGE UP (ref 7–14)
ANION GAP SERPL CALC-SCNC: 11 MMOL/L — SIGNIFICANT CHANGE UP (ref 7–14)
BUN SERPL-MCNC: 55 MG/DL — HIGH (ref 7–23)
BUN SERPL-MCNC: 56 MG/DL — HIGH (ref 7–23)
CALCIUM SERPL-MCNC: 9.3 MG/DL — SIGNIFICANT CHANGE UP (ref 8.4–10.5)
CALCIUM SERPL-MCNC: 9.8 MG/DL — SIGNIFICANT CHANGE UP (ref 8.4–10.5)
CHLORIDE SERPL-SCNC: 100 MMOL/L — SIGNIFICANT CHANGE UP (ref 98–107)
CHLORIDE SERPL-SCNC: 100 MMOL/L — SIGNIFICANT CHANGE UP (ref 98–107)
CO2 SERPL-SCNC: 21 MMOL/L — LOW (ref 22–31)
CO2 SERPL-SCNC: 23 MMOL/L — SIGNIFICANT CHANGE UP (ref 22–31)
COVID-19 SPIKE DOMAIN AB INTERP: POSITIVE
COVID-19 SPIKE DOMAIN ANTIBODY RESULT: 89.6 U/ML — HIGH
CREAT SERPL-MCNC: 2.63 MG/DL — HIGH (ref 0.5–1.3)
CREAT SERPL-MCNC: 2.68 MG/DL — HIGH (ref 0.5–1.3)
GLUCOSE BLDC GLUCOMTR-MCNC: 234 MG/DL — HIGH (ref 70–99)
GLUCOSE BLDC GLUCOMTR-MCNC: 336 MG/DL — HIGH (ref 70–99)
GLUCOSE BLDC GLUCOMTR-MCNC: 338 MG/DL — HIGH (ref 70–99)
GLUCOSE BLDC GLUCOMTR-MCNC: 355 MG/DL — HIGH (ref 70–99)
GLUCOSE SERPL-MCNC: 301 MG/DL — HIGH (ref 70–99)
GLUCOSE SERPL-MCNC: 361 MG/DL — HIGH (ref 70–99)
HCT VFR BLD CALC: 26.2 % — LOW (ref 34.5–45)
HCV AB S/CO SERPL IA: 0.2 S/CO — SIGNIFICANT CHANGE UP (ref 0–0.99)
HCV AB SERPL-IMP: SIGNIFICANT CHANGE UP
HGB BLD-MCNC: 8.3 G/DL — LOW (ref 11.5–15.5)
MAGNESIUM SERPL-MCNC: 1.9 MG/DL — SIGNIFICANT CHANGE UP (ref 1.6–2.6)
MAGNESIUM SERPL-MCNC: 2 MG/DL — SIGNIFICANT CHANGE UP (ref 1.6–2.6)
MCHC RBC-ENTMCNC: 27.5 PG — SIGNIFICANT CHANGE UP (ref 27–34)
MCHC RBC-ENTMCNC: 31.7 GM/DL — LOW (ref 32–36)
MCV RBC AUTO: 86.8 FL — SIGNIFICANT CHANGE UP (ref 80–100)
NRBC # BLD: 0 /100 WBCS — SIGNIFICANT CHANGE UP
NRBC # FLD: 0 K/UL — SIGNIFICANT CHANGE UP
PHOSPHATE SERPL-MCNC: 3.5 MG/DL — SIGNIFICANT CHANGE UP (ref 2.5–4.5)
PHOSPHATE SERPL-MCNC: 3.8 MG/DL — SIGNIFICANT CHANGE UP (ref 2.5–4.5)
PLATELET # BLD AUTO: 246 K/UL — SIGNIFICANT CHANGE UP (ref 150–400)
POTASSIUM SERPL-MCNC: 4.8 MMOL/L — SIGNIFICANT CHANGE UP (ref 3.5–5.3)
POTASSIUM SERPL-MCNC: 5.6 MMOL/L — HIGH (ref 3.5–5.3)
POTASSIUM SERPL-SCNC: 4.8 MMOL/L — SIGNIFICANT CHANGE UP (ref 3.5–5.3)
POTASSIUM SERPL-SCNC: 5.6 MMOL/L — HIGH (ref 3.5–5.3)
RBC # BLD: 3.02 M/UL — LOW (ref 3.8–5.2)
RBC # FLD: 13.7 % — SIGNIFICANT CHANGE UP (ref 10.3–14.5)
SARS-COV-2 IGG+IGM SERPL QL IA: 89.6 U/ML — HIGH
SARS-COV-2 IGG+IGM SERPL QL IA: POSITIVE
SODIUM SERPL-SCNC: 132 MMOL/L — LOW (ref 135–145)
SODIUM SERPL-SCNC: 133 MMOL/L — LOW (ref 135–145)
T4 FREE SERPL-MCNC: 1.2 NG/DL — SIGNIFICANT CHANGE UP (ref 0.9–1.8)
TSH SERPL-MCNC: 3.59 UIU/ML — SIGNIFICANT CHANGE UP (ref 0.27–4.2)
WBC # BLD: 8.58 K/UL — SIGNIFICANT CHANGE UP (ref 3.8–10.5)
WBC # FLD AUTO: 8.58 K/UL — SIGNIFICANT CHANGE UP (ref 3.8–10.5)

## 2021-10-30 PROCEDURE — 99222 1ST HOSP IP/OBS MODERATE 55: CPT

## 2021-10-30 PROCEDURE — 99232 SBSQ HOSP IP/OBS MODERATE 35: CPT

## 2021-10-30 PROCEDURE — 88305 TISSUE EXAM BY PATHOLOGIST: CPT | Mod: 26

## 2021-10-30 RX ORDER — INSULIN LISPRO 100/ML
6 VIAL (ML) SUBCUTANEOUS
Refills: 0 | Status: DISCONTINUED | OUTPATIENT
Start: 2021-10-30 | End: 2021-10-31

## 2021-10-30 RX ORDER — MAGNESIUM SULFATE 500 MG/ML
1 VIAL (ML) INJECTION ONCE
Refills: 0 | Status: COMPLETED | OUTPATIENT
Start: 2021-10-30 | End: 2021-10-30

## 2021-10-30 RX ORDER — INSULIN GLARGINE 100 [IU]/ML
20 INJECTION, SOLUTION SUBCUTANEOUS AT BEDTIME
Refills: 0 | Status: DISCONTINUED | OUTPATIENT
Start: 2021-10-30 | End: 2021-10-31

## 2021-10-30 RX ORDER — SODIUM ZIRCONIUM CYCLOSILICATE 10 G/10G
10 POWDER, FOR SUSPENSION ORAL ONCE
Refills: 0 | Status: COMPLETED | OUTPATIENT
Start: 2021-10-30 | End: 2021-10-30

## 2021-10-30 RX ORDER — INSULIN LISPRO 100/ML
11 VIAL (ML) SUBCUTANEOUS ONCE
Refills: 0 | Status: COMPLETED | OUTPATIENT
Start: 2021-10-30 | End: 2021-10-30

## 2021-10-30 RX ADMIN — Medication 325 MILLIGRAM(S): at 18:55

## 2021-10-30 RX ADMIN — HEPARIN SODIUM 5000 UNIT(S): 5000 INJECTION INTRAVENOUS; SUBCUTANEOUS at 07:35

## 2021-10-30 RX ADMIN — CEFEPIME 100 MILLIGRAM(S): 1 INJECTION, POWDER, FOR SOLUTION INTRAMUSCULAR; INTRAVENOUS at 07:35

## 2021-10-30 RX ADMIN — Medication 50 MILLIGRAM(S): at 07:35

## 2021-10-30 RX ADMIN — HEPARIN SODIUM 5000 UNIT(S): 5000 INJECTION INTRAVENOUS; SUBCUTANEOUS at 17:06

## 2021-10-30 RX ADMIN — Medication 11 UNIT(S): at 12:17

## 2021-10-30 RX ADMIN — ATORVASTATIN CALCIUM 40 MILLIGRAM(S): 80 TABLET, FILM COATED ORAL at 21:18

## 2021-10-30 RX ADMIN — Medication 4: at 08:40

## 2021-10-30 RX ADMIN — Medication 50 MILLIGRAM(S): at 14:59

## 2021-10-30 RX ADMIN — INSULIN GLARGINE 20 UNIT(S): 100 INJECTION, SOLUTION SUBCUTANEOUS at 22:19

## 2021-10-30 RX ADMIN — Medication 650 MILLIGRAM(S): at 22:15

## 2021-10-30 RX ADMIN — Medication 6 UNIT(S): at 17:43

## 2021-10-30 RX ADMIN — Medication 2: at 17:42

## 2021-10-30 RX ADMIN — Medication 2: at 22:19

## 2021-10-30 RX ADMIN — Medication 75 MICROGRAM(S): at 07:35

## 2021-10-30 RX ADMIN — Medication 650 MILLIGRAM(S): at 21:18

## 2021-10-30 RX ADMIN — CEFEPIME 100 MILLIGRAM(S): 1 INJECTION, POWDER, FOR SOLUTION INTRAMUSCULAR; INTRAVENOUS at 17:05

## 2021-10-30 RX ADMIN — SODIUM ZIRCONIUM CYCLOSILICATE 10 GRAM(S): 10 POWDER, FOR SUSPENSION ORAL at 05:11

## 2021-10-30 RX ADMIN — Medication 325 MILLIGRAM(S): at 07:36

## 2021-10-30 RX ADMIN — Medication 50 MILLIGRAM(S): at 21:18

## 2021-10-30 RX ADMIN — Medication 3 UNIT(S): at 08:37

## 2021-10-30 RX ADMIN — Medication 100 GRAM(S): at 11:39

## 2021-10-30 NOTE — PROGRESS NOTE ADULT - SUBJECTIVE AND OBJECTIVE BOX
[facility] Medicine  Pager [phone]    Patient is a 71y old  Female who presents with a chief complaint of ear mass, pre op (30 Oct 2021 11:11)      INTERVAL HPI/OVERNIGHT EVENTS:    MEDICATIONS  (STANDING):  atorvastatin 40 milliGRAM(s) Oral at bedtime  cefepime   IVPB 2000 milliGRAM(s) IV Intermittent every 12 hours  dextrose 40% Gel 15 Gram(s) Oral once  dextrose 5%. 1000 milliLiter(s) (50 mL/Hr) IV Continuous <Continuous>  dextrose 5%. 1000 milliLiter(s) (100 mL/Hr) IV Continuous <Continuous>  dextrose 50% Injectable 25 Gram(s) IV Push once  dextrose 50% Injectable 12.5 Gram(s) IV Push once  dextrose 50% Injectable 25 Gram(s) IV Push once  glucagon  Injectable 1 milliGRAM(s) IntraMuscular once  heparin   Injectable 5000 Unit(s) SubCutaneous every 12 hours  hydrALAZINE 50 milliGRAM(s) Oral three times a day  influenza  Vaccine (HIGH DOSE) 0.7 milliLiter(s) IntraMuscular once  insulin glargine Injectable (LANTUS) 10 Unit(s) SubCutaneous at bedtime  insulin lispro (ADMELOG) corrective regimen sliding scale   SubCutaneous three times a day before meals  insulin lispro (ADMELOG) corrective regimen sliding scale   SubCutaneous at bedtime  insulin lispro Injectable (ADMELOG) 3 Unit(s) SubCutaneous three times a day before meals  levothyroxine 75 MICROGram(s) Oral daily  magnesium sulfate  IVPB 1 Gram(s) IV Intermittent once  sodium bicarbonate 325 milliGRAM(s) Oral two times a day    MEDICATIONS  (PRN):  acetaminophen     Tablet .. 650 milliGRAM(s) Oral every 6 hours PRN Mild Pain (1 - 3)      Allergies    No Known Allergies    Intolerances        REVIEW OF SYSTEMS:  Please see interval HPI:    Vital Signs Last 24 Hrs  T(C): 36.8 (30 Oct 2021 09:54), Max: 37.1 (30 Oct 2021 07:20)  T(F): 98.3 (30 Oct 2021 09:54), Max: 98.7 (30 Oct 2021 07:20)  HR: 66 (30 Oct 2021 09:54) (65 - 75)  BP: 125/63 (30 Oct 2021 09:54) (125/63 - 176/71)  BP(mean): --  RR: 18 (30 Oct 2021 09:54) (18 - 18)  SpO2: 99% (30 Oct 2021 09:54) (98% - 99%)  I&O's Detail    29 Oct 2021 07:01  -  30 Oct 2021 07:00  --------------------------------------------------------  IN:    Oral Fluid: 480 mL  Total IN: 480 mL    OUT:    Voided (mL): 2100 mL  Total OUT: 2100 mL    Total NET: -1620 mL            PHYSICAL EXAM:  GENERAL:   HEAD:    EYES:   ENMT:   NECK:   NERVOUS SYSTEM:    CHEST/LUNG:   HEART:   ABDOMEN:   EXTREMITIES:    LYMPH:   SKIN:     LABS:                        8.3    8.58  )-----------( 246      ( 30 Oct 2021 10:12 )             26.2     30 Oct 2021 10:12    132    |  100    |  56     ----------------------------<  301    4.8     |  21     |  2.63     Ca    9.3        30 Oct 2021 10:12  Phos  3.5       30 Oct 2021 10:12  Mg     1.90      30 Oct 2021 10:12      PT/INR - ( 29 Oct 2021 05:01 )   PT: 11.4 sec;   INR: 0.99 ratio         PTT - ( 29 Oct 2021 05:01 )  PTT:32.0 sec  CAPILLARY BLOOD GLUCOSE      POCT Blood Glucose.: 338 mg/dL (30 Oct 2021 08:20)  POCT Blood Glucose.: 335 mg/dL (29 Oct 2021 21:45)  POCT Blood Glucose.: 391 mg/dL (29 Oct 2021 16:54)  POCT Blood Glucose.: 255 mg/dL (29 Oct 2021 12:20)    BLOOD CULTURE  10-29 @ 10:15   Moderate Gram Negative Rods  --  --    RADIOLOGY & ADDITIONAL TESTS:    Imaging Personally Reviewed:  [ ] YES     Consultant(s) Notes Reviewed:      Care Discussed with Consultants/Other Providers: [facility] Medicine  Pager [phone]    Patient is a 71y old  Female who presents with a chief complaint of ear mass, pre op (30 Oct 2021 11:11)      INTERVAL HPI/OVERNIGHT EVENTS:  s/p temporizing measures and lokelema for hyperkalemia.   Patient reports better sleep, denies fever/chills, says pain/burning sensation in and around L ear bit better. Aware of renal evaluation, ID recommendations for antibiotics. Discussed BP and glycemic control, patient in agreement with plan. She reports remembering that she takes 20U long acting and 6U short acting insulin.      MEDICATIONS  (STANDING):  atorvastatin 40 milliGRAM(s) Oral at bedtime  cefepime   IVPB 2000 milliGRAM(s) IV Intermittent every 12 hours  dextrose 40% Gel 15 Gram(s) Oral once  dextrose 5%. 1000 milliLiter(s) (50 mL/Hr) IV Continuous <Continuous>  dextrose 5%. 1000 milliLiter(s) (100 mL/Hr) IV Continuous <Continuous>  dextrose 50% Injectable 25 Gram(s) IV Push once  dextrose 50% Injectable 12.5 Gram(s) IV Push once  dextrose 50% Injectable 25 Gram(s) IV Push once  glucagon  Injectable 1 milliGRAM(s) IntraMuscular once  heparin   Injectable 5000 Unit(s) SubCutaneous every 12 hours  hydrALAZINE 50 milliGRAM(s) Oral three times a day  influenza  Vaccine (HIGH DOSE) 0.7 milliLiter(s) IntraMuscular once  insulin glargine Injectable (LANTUS) 10 Unit(s) SubCutaneous at bedtime  insulin lispro (ADMELOG) corrective regimen sliding scale   SubCutaneous three times a day before meals  insulin lispro (ADMELOG) corrective regimen sliding scale   SubCutaneous at bedtime  insulin lispro Injectable (ADMELOG) 3 Unit(s) SubCutaneous three times a day before meals  levothyroxine 75 MICROGram(s) Oral daily  magnesium sulfate  IVPB 1 Gram(s) IV Intermittent once  sodium bicarbonate 325 milliGRAM(s) Oral two times a day    MEDICATIONS  (PRN):  acetaminophen     Tablet .. 650 milliGRAM(s) Oral every 6 hours PRN Mild Pain (1 - 3)    Allergies  No Known Allergies    Intolerances    REVIEW OF SYSTEMS:  Please see interval HPI:    Vital Signs Last 24 Hrs  T(C): 36.8 (30 Oct 2021 09:54), Max: 37.1 (30 Oct 2021 07:20)  T(F): 98.3 (30 Oct 2021 09:54), Max: 98.7 (30 Oct 2021 07:20)  HR: 66 (30 Oct 2021 09:54) (65 - 75)  BP: 125/63 (30 Oct 2021 09:54) (125/63 - 176/71)  RR: 18 (30 Oct 2021 09:54) (18 - 18)  SpO2: 99% (30 Oct 2021 09:54) (98% - 99%)  I&O's Detail    29 Oct 2021 07:01  -  30 Oct 2021 07:00  --------------------------------------------------------  IN:    Oral Fluid: 480 mL  Total IN: 480 mL    OUT:    Voided (mL): 2100 mL  Total OUT: 2100 mL    Total NET: -1620 mL    PHYSICAL EXAM:  GENERAL: Obese female, lying in bed, in no acute distress  HEAD:  NC/AT  EYES: EOMI, clear sclera/conjunctiva  ENMT: +L ear fungating mass, necrotic tissue, some erythema of cheek, MMM  NECK: supple, non JVD  RESPIRATORY: comfortable on RA, speaking in full sentences  GASTROINTESTINAL: obese, soft, non-tender +BS  GENITOURINARY: no baptiste  EXTREMITIES:  no c/c/e  NERVOUS SYSTEM:  moving all extremities, SILT grossly, non-focal  PSYCH: alert, calm, pleaseant      LABS:                        8.3    8.58  )-----------( 246      ( 30 Oct 2021 10:12 )             26.2     30 Oct 2021 10:12    132    |  100    |  56     ----------------------------<  301    4.8     |  21     |  2.63     Ca    9.3        30 Oct 2021 10:12  Phos  3.5       30 Oct 2021 10:12  Mg     1.90      30 Oct 2021 10:12    PT/INR - ( 29 Oct 2021 05:01 )   PT: 11.4 sec;   INR: 0.99 ratio    PTT - ( 29 Oct 2021 05:01 )  PTT:32.0 sec    CAPILLARY BLOOD GLUCOSE  POCT Blood Glucose.: 338 mg/dL (30 Oct 2021 08:20)  POCT Blood Glucose.: 335 mg/dL (29 Oct 2021 21:45)  POCT Blood Glucose.: 391 mg/dL (29 Oct 2021 16:54)  POCT Blood Glucose.: 255 mg/dL (29 Oct 2021 12:20)    WOUND CULTURE  10-29 @ 10:15   Moderate Gram Negative Rods  --  --    RADIOLOGY & ADDITIONAL TESTS:    Imaging Personally Reviewed:  [ ] YES     Consultant(s) Notes Reviewed:  ID, Renal, ENT    Care Discussed with Consultants/Other Providers:

## 2021-10-30 NOTE — CONSULT NOTE ADULT - SUBJECTIVE AND OBJECTIVE BOX
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 Pt was admitted at [facility] on 10/24 after experiencing syncopal episode and after extensive workup there, is now transferred here for further management. Pt reporting increased "burning" pain of left ear w/ increased foul smelling drainage from left ear lesion despite outpt antibiotics. Wound cultures from [facility] noted, (collected 10/25), with proteus mirabilis and pseudomonas aeruginosa. No recent fever/chills, no photophobia/eye pain/changes in vision, no sore throat/dysphagia, No chest pain/palpitations, no SOB/cough/wheeze/stridor, No abdominal pain, No N/V/D, no dysuria/frequency/discharge, No neck/back pain, no rash, no new focal neuro symptoms.     Here pt is afebrile with no leukocytosis and stable vitals. Pt was noticed to have a  fungating left ear mass w/ necrosed tissue in pinna, and foul smelling purulent drainage    REVIEW OF SYSTEMS  [  ] ROS unobtainable because:    [ x ] All other systems negative except as noted below    Constitutional:  [ ] fever [ ] chills  [ ] weight loss  [ ]night sweat  [ ]poor appetite/PO intake [ ]fatigue   Skin:  [ ] rash [ ] phlebitis	  Eyes: [ ] icterus [ ] pain  [ ] discharge	  ENMT: [ ] sore throat  [ ] thrush [ ] ulcers [ ] exudates [ ]anosmia  Respiratory: [ ] dyspnea [ ] hemoptysis [ ] cough [ ] sputum	  Cardiovascular:  [ ] chest pain [ ] palpitations [ ] edema	  Gastrointestinal:  [ ] nausea [ ] vomiting [ ] diarrhea [ ] constipation [ ] pain	  Genitourinary:  [ ] dysuria [ ] frequency [ ] hematuria [ ] discharge [ ] flank pain  [ ] incontinence  Musculoskeletal:  [ ] myalgias [ ] arthralgias [ ] arthritis  [ ] back pain  Neurological:  [ ] headache [ ] weakness [ ] seizures  [ ] confusion/altered mental status    prior hospital charts reviewed [V]  primary team notes reviewed [V]  other consultant notes reviewed [V]    PAST MEDICAL & SURGICAL HISTORY:  Cancer of ear        SOCIAL HISTORY:  - Denied smoking/vaping/alcohol/recreational drug use    FAMILY HISTORY:      Allergies  No Known Allergies        ANTIMICROBIALS:  cefepime   IVPB 2000 every 12 hours      ANTIMICROBIALS (past 90 days):  MEDICATIONS  (STANDING):    vancomycin  IVPB   300 mL/Hr IV Intermittent (10-29-21 @ 21:47)        OTHER MEDS:   MEDICATIONS  (STANDING):  acetaminophen     Tablet .. 650 every 6 hours PRN  atorvastatin 40 at bedtime  dextrose 40% Gel 15 once  dextrose 50% Injectable 25 once  dextrose 50% Injectable 12.5 once  dextrose 50% Injectable 25 once  glucagon  Injectable 1 once  heparin   Injectable 5000 every 12 hours  hydrALAZINE 50 three times a day  influenza  Vaccine (HIGH DOSE) 0.7 once  insulin glargine Injectable (LANTUS) 10 at bedtime  insulin lispro (ADMELOG) corrective regimen sliding scale  three times a day before meals  insulin lispro (ADMELOG) corrective regimen sliding scale  at bedtime  insulin lispro Injectable (ADMELOG) 3 three times a day before meals  levothyroxine 75 daily      VITALS:  Vital Signs Last 24 Hrs  T(F): 97.8 (10-30-21 @ 02:38), Max: 98.1 (10-29-21 @ 17:38)    Vital Signs Last 24 Hrs  HR: 75 (10-30-21 @ 02:38) (66 - 75)  BP: 176/71 (10-30-21 @ 02:38) (149/69 - 189/83)  RR: 18 (10-30-21 @ 02:38)  SpO2: 98% (10-30-21 @ 02:38) (98% - 100%)  Wt(kg): --    EXAM:    GA: NAD, AOx3  HEENT: oral cavity no lesion  CV: nl S1/S2, no RMG  Lungs: CTAB, No distress  Abd: BS+, soft, nontender, no rebounding pain  Ext: no edema  Neuro: No focal deficits  Skin: Intact  IV: no phlebitis    Labs:                        9.6    9.70  )-----------( 262      ( 29 Oct 2021 04:57 )             31.8     10-29    133<L>  |  100  |  55<H>  ----------------------------<  361<H>  5.6<H>   |  23  |  2.68<H>    Ca    9.8      29 Oct 2021 23:32  Phos  3.8     10-29  Mg     2.00     10-29    TPro  7.0  /  Alb  3.1<L>  /  TBili  0.3  /  DBili  x   /  AST  36<H>  /  ALT  27  /  AlkPhos  124<H>  10-29      WBC Trend:  WBC Count: 9.70 (10-29-21 @ 04:57)      Auto Neutrophil #: 6.63 K/uL (10-29-21 @ 04:57)      Creatine Trend:  Creatinine, Serum: 2.68 (10-29)  Creatinine, Serum: 2.45 (10-29)  Creatinine, Serum: 2.63 (10-29)      Liver Biochemical Testing Trend:  Alanine Aminotransferase (ALT/SGPT): 27 (10-29)  Aspartate Aminotransferase (AST/SGOT): 36 (10-29-21 @ 04:57)  Bilirubin Total, Serum: 0.3 (10-29)      Trend LDH      Auto Eosinophil %: 4.7 % (10-29-21 @ 04:57)          MICROBIOLOGY:                            COVID-19 PCR: NotDetec (10-29-21 @ 05:25)                      Blood Gas Venous - Lactate: 1.2 (10-29 @ 16:24)        CSF:                  RADIOLOGY:  imaging below personally reviewed    < from: NM PET/CT Onc FDG Skull to Thigh, Inital (10.08.21 @ 15:43) >  1.  Intensely FDG avid soft tissue thickening in the left ureter involving the pinna, pre- and post auricular regions and extending into the external auditory canal system with known squamous cell carcinoma.  2.  FDG avid left cervical level II-IV/supraclavicular lymph nodes, suspicious for metastatic disease.  3.  Leiomyomatous uterus with a small central focus of uterine FDG avidity, possibly in the endometrial cavity, indeterminate. Suggest further evaluation with ultrasound or MRI.  4.  Small mildly FDG avid right axillary lymph nodes are nonspecific, possibly reactive as patient reports administration of Covid 19 vaccination in the right arm in September 2021. Consider ultrasound for further evaluation as indicated.  5.  Non-FDG avid 1.5 cm right thyroid nodule. Suggest ultrasound for further evaluation.  < end of copied text >    < from: CT Neck Soft Tissue No Cont (10.04.21 @ 07:59) >   Findings compatible with diffuse tumor infiltration of the leftpinna and external auditory canal with evidence of adjacent deep soft tissue infiltration and possible involvement of the superior parotid gland.  No evidence of temporal bone cortical erosion/tumor invasion.  Multiple enlarged/abnormal appearingleft cervical lymph nodes strongly suspicious for gracia metastases.  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 Pt was admitted at [facility] on 10/24 after experiencing syncopal episode and after extensive workup there, is now transferred here for further management. Pt reporting increased "burning" pain of left ear w/ increased foul smelling drainage from left ear lesion since last few months. Wound cultures from [facility] noted, (collected 10/25), with proteus mirabilis and pseudomonas aeruginosa. No recent fever/chills, no photophobia/eye pain/changes in vision, no sore throat/dysphagia, No chest pain/palpitations, no SOB/cough/wheeze/stridor, No abdominal pain, No N/V/D, no dysuria/frequency/discharge, No neck/back pain, no rash, no new focal neuro symptoms.     Here pt is afebrile with no leukocytosis and stable vitals. Pt was noticed to have a  fungating left ear mass w/ necrosed tissue in pinna, and foul smelling purulent drainage    REVIEW OF SYSTEMS  [  ] ROS unobtainable because:    [ x ] All other systems negative except as noted below    Constitutional:  [ ] fever [ ] chills  [ ] weight loss  [ ]night sweat  [ ]poor appetite/PO intake [ ]fatigue   Skin:  [ ] rash [ ] phlebitis	  Eyes: [ ] icterus [ ] pain  [ ] discharge	  ENMT: [ ] sore throat  [ ] thrush [ ] ulcers [ ] exudates [ ]anosmia  Respiratory: [ ] dyspnea [ ] hemoptysis [ ] cough [ ] sputum	  Cardiovascular:  [ ] chest pain [ ] palpitations [ ] edema	  Gastrointestinal:  [ ] nausea [ ] vomiting [ ] diarrhea [ ] constipation [ ] pain	  Genitourinary:  [ ] dysuria [ ] frequency [ ] hematuria [ ] discharge [ ] flank pain  [ ] incontinence  Musculoskeletal:  [ ] myalgias [ ] arthralgias [ ] arthritis  [ ] back pain  Neurological:  [ ] headache [ ] weakness [ ] seizures  [ ] confusion/altered mental status    prior hospital charts reviewed [V]  primary team notes reviewed [V]  other consultant notes reviewed [V]    PAST MEDICAL & SURGICAL HISTORY:  Cancer of ear    SOCIAL HISTORY:  - Denied smoking/vaping/alcohol/recreational drug use  - Has a cat (Healthy)    FAMILY HISTORY:  CA in mother (Unknown)    Allergies  No Known Allergies    ANTIMICROBIALS:  cefepime   IVPB 2000 every 12 hours      ANTIMICROBIALS (past 90 days):  MEDICATIONS  (STANDING):    vancomycin  IVPB   300 mL/Hr IV Intermittent (10-29-21 @ 21:47)        OTHER MEDS:   MEDICATIONS  (STANDING):  acetaminophen     Tablet .. 650 every 6 hours PRN  atorvastatin 40 at bedtime  dextrose 40% Gel 15 once  dextrose 50% Injectable 25 once  dextrose 50% Injectable 12.5 once  dextrose 50% Injectable 25 once  glucagon  Injectable 1 once  heparin   Injectable 5000 every 12 hours  hydrALAZINE 50 three times a day  influenza  Vaccine (HIGH DOSE) 0.7 once  insulin glargine Injectable (LANTUS) 10 at bedtime  insulin lispro (ADMELOG) corrective regimen sliding scale  three times a day before meals  insulin lispro (ADMELOG) corrective regimen sliding scale  at bedtime  insulin lispro Injectable (ADMELOG) 3 three times a day before meals  levothyroxine 75 daily      VITALS:  Vital Signs Last 24 Hrs  T(F): 97.8 (10-30-21 @ 02:38), Max: 98.1 (10-29-21 @ 17:38)    Vital Signs Last 24 Hrs  HR: 75 (10-30-21 @ 02:38) (66 - 75)  BP: 176/71 (10-30-21 @ 02:38) (149/69 - 189/83)  RR: 18 (10-30-21 @ 02:38)  SpO2: 98% (10-30-21 @ 02:38) (98% - 100%)  Wt(kg): --    EXAM:    GA: NAD, AOx3  HEENT: oral cavity no lesion  CV: nl S1/S2, no RMG  Lungs: CTAB, No distress  Abd: BS+, soft, nontender, no rebounding pain  Ext: no edema  Neuro: No focal deficits  Skin: Intact  IV: no phlebitis    Labs:                        9.6    9.70  )-----------( 262      ( 29 Oct 2021 04:57 )             31.8     10-29    133<L>  |  100  |  55<H>  ----------------------------<  361<H>  5.6<H>   |  23  |  2.68<H>    Ca    9.8      29 Oct 2021 23:32  Phos  3.8     10-29  Mg     2.00     10-29    TPro  7.0  /  Alb  3.1<L>  /  TBili  0.3  /  DBili  x   /  AST  36<H>  /  ALT  27  /  AlkPhos  124<H>  10-29      WBC Trend:  WBC Count: 9.70 (10-29-21 @ 04:57)      Auto Neutrophil #: 6.63 K/uL (10-29-21 @ 04:57)      Creatine Trend:  Creatinine, Serum: 2.68 (10-29)  Creatinine, Serum: 2.45 (10-29)  Creatinine, Serum: 2.63 (10-29)      Liver Biochemical Testing Trend:  Alanine Aminotransferase (ALT/SGPT): 27 (10-29)  Aspartate Aminotransferase (AST/SGOT): 36 (10-29-21 @ 04:57)  Bilirubin Total, Serum: 0.3 (10-29)      Trend LDH      Auto Eosinophil %: 4.7 % (10-29-21 @ 04:57)          MICROBIOLOGY:                            COVID-19 PCR: NotDetec (10-29-21 @ 05:25)                      Blood Gas Venous - Lactate: 1.2 (10-29 @ 16:24)        CSF:                  RADIOLOGY:  imaging below personally reviewed    < from: NM PET/CT Onc FDG Skull to Thigh, Inital (10.08.21 @ 15:43) >  1.  Intensely FDG avid soft tissue thickening in the left ureter involving the pinna, pre- and post auricular regions and extending into the external auditory canal system with known squamous cell carcinoma.  2.  FDG avid left cervical level II-IV/supraclavicular lymph nodes, suspicious for metastatic disease.  3.  Leiomyomatous uterus with a small central focus of uterine FDG avidity, possibly in the endometrial cavity, indeterminate. Suggest further evaluation with ultrasound or MRI.  4.  Small mildly FDG avid right axillary lymph nodes are nonspecific, possibly reactive as patient reports administration of Covid 19 vaccination in the right arm in September 2021. Consider ultrasound for further evaluation as indicated.  5.  Non-FDG avid 1.5 cm right thyroid nodule. Suggest ultrasound for further evaluation.  < end of copied text >    < from: CT Neck Soft Tissue No Cont (10.04.21 @ 07:59) >   Findings compatible with diffuse tumor infiltration of the leftpinna and external auditory canal with evidence of adjacent deep soft tissue infiltration and possible involvement of the superior parotid gland.  No evidence of temporal bone cortical erosion/tumor invasion.  Multiple enlarged/abnormal appearingleft cervical lymph nodes strongly suspicious for gracia metastases.  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 Pt was admitted at [facility] on 10/24 after experiencing syncopal episode and after extensive workup there, is now transferred here for further management. Pt reporting increased "burning" pain of left ear w/ increased foul smelling drainage from left ear lesion since last few months. Wound cultures from [facility] noted, (collected 10/25), with proteus mirabilis and pseudomonas aeruginosa. No recent fever/chills, no photophobia/eye pain/changes in vision, no sore throat/dysphagia, No chest pain/palpitations, no SOB/cough/wheeze/stridor, No abdominal pain, No N/V/D, no dysuria/frequency/discharge, No neck/back pain, no rash, no new focal neuro symptoms.     Here pt is afebrile with no leukocytosis and stable vitals. Pt was noticed to have a  fungating left ear mass w/ necrosed tissue in pinna, and foul smelling purulent drainage    REVIEW OF SYSTEMS  [  ] ROS unobtainable because:    [ x ] All other systems negative except as noted below    Constitutional:  [ ] fever [ ] chills  [ ] weight loss  [ ]night sweat  [ ]poor appetite/PO intake [ ]fatigue   Skin:  [ ] rash [ ] phlebitis	  Eyes: [ ] icterus [ ] pain  [ ] discharge	  ENMT: per HPI  Respiratory: [ ] dyspnea [ ] cough [ ] sputum	  Cardiovascular:  [ ] chest pain [ ] palpitations [ ] edema	  Gastrointestinal:  [ ] nausea [ ] vomiting [ ] diarrhea 	  Genitourinary:  [ ] dysuria [ ] frequency   Musculoskeletal:  [ ] myalgias [ ] arthralgias [ ] arthritis  [ ] back pain  Neurological:  [ ] headache [ ] weakness [ ] seizures  [ ] confusion/altered mental status  Extremities: No swelling   Psych: No anxiety       prior hospital charts reviewed [V]  primary team notes reviewed [V]  other consultant notes reviewed [V]    PAST MEDICAL & SURGICAL HISTORY:  Cancer of ear    SOCIAL HISTORY:  - Denied smoking/vaping/alcohol/recreational drug use  - Has a cat (Healthy)    FAMILY HISTORY:  CA in mother (Unknown)    Allergies  No Known Allergies    ANTIMICROBIALS:  cefepime   IVPB 2000 every 12 hours      ANTIMICROBIALS (past 90 days):  MEDICATIONS  (STANDING):    vancomycin  IVPB   300 mL/Hr IV Intermittent (10-29-21 @ 21:47)        OTHER MEDS:   MEDICATIONS  (STANDING):  acetaminophen     Tablet .. 650 every 6 hours PRN  atorvastatin 40 at bedtime  dextrose 40% Gel 15 once  dextrose 50% Injectable 25 once  dextrose 50% Injectable 12.5 once  dextrose 50% Injectable 25 once  glucagon  Injectable 1 once  heparin   Injectable 5000 every 12 hours  hydrALAZINE 50 three times a day  influenza  Vaccine (HIGH DOSE) 0.7 once  insulin glargine Injectable (LANTUS) 10 at bedtime  insulin lispro (ADMELOG) corrective regimen sliding scale  three times a day before meals  insulin lispro (ADMELOG) corrective regimen sliding scale  at bedtime  insulin lispro Injectable (ADMELOG) 3 three times a day before meals  levothyroxine 75 daily      VITALS:  Vital Signs Last 24 Hrs  T(F): 97.8 (10-30-21 @ 02:38), Max: 98.1 (10-29-21 @ 17:38)    Vital Signs Last 24 Hrs  HR: 75 (10-30-21 @ 02:38) (66 - 75)  BP: 176/71 (10-30-21 @ 02:38) (149/69 - 189/83)  RR: 18 (10-30-21 @ 02:38)  SpO2: 98% (10-30-21 @ 02:38) (98% - 100%)  Wt(kg): --      EXAM:  GA: NAD, AOx3  HEENT: oral cavity no lesion  Ear: lt ear external ear necrotic, with drainage, some tenderness in mastoid area.   CV: nl S1/S2,   Lungs: + air entry b/l lungs   Abd: BS+, soft, nontender,   : No baptiste   Ext: no edema  Neuro: No focal deficits  Skin: no rash   IV: no phlebitis  Vascular: Palpable pulses       Labs:                        9.6    9.70  )-----------( 262      ( 29 Oct 2021 04:57 )             31.8     10-29    133<L>  |  100  |  55<H>  ----------------------------<  361<H>  5.6<H>   |  23  |  2.68<H>    Ca    9.8      29 Oct 2021 23:32  Phos  3.8     10-29  Mg     2.00     10-29    TPro  7.0  /  Alb  3.1<L>  /  TBili  0.3  /  DBili  x   /  AST  36<H>  /  ALT  27  /  AlkPhos  124<H>  10-29      WBC Trend:  WBC Count: 9.70 (10-29-21 @ 04:57)      Auto Neutrophil #: 6.63 K/uL (10-29-21 @ 04:57)      Creatine Trend:  Creatinine, Serum: 2.68 (10-29)  Creatinine, Serum: 2.45 (10-29)  Creatinine, Serum: 2.63 (10-29)      Liver Biochemical Testing Trend:  Alanine Aminotransferase (ALT/SGPT): 27 (10-29)  Aspartate Aminotransferase (AST/SGOT): 36 (10-29-21 @ 04:57)  Bilirubin Total, Serum: 0.3 (10-29)    Trend LDH      MICROBIOLOGY:    COVID-19 PCR: NotDetec (10-29-21 @ 05:25)    Blood Gas Venous - Lactate: 1.2 (10-29 @ 16:24)            RADIOLOGY:  imaging below personally reviewed except PET CT     < from: NM PET/CT Onc FDG Skull to Thigh, Inital (10.08.21 @ 15:43) >  1.  Intensely FDG avid soft tissue thickening in the left ureter involving the pinna, pre- and post auricular regions and extending into the external auditory canal system with known squamous cell carcinoma.  2.  FDG avid left cervical level II-IV/supraclavicular lymph nodes, suspicious for metastatic disease.  3.  Leiomyomatous uterus with a small central focus of uterine FDG avidity, possibly in the endometrial cavity, indeterminate. Suggest further evaluation with ultrasound or MRI.  4.  Small mildly FDG avid right axillary lymph nodes are nonspecific, possibly reactive as patient reports administration of Covid 19 vaccination in the right arm in September 2021. Consider ultrasound for further evaluation as indicated.  5.  Non-FDG avid 1.5 cm right thyroid nodule. Suggest ultrasound for further evaluation.      < from: CT Neck Soft Tissue No Cont (10.04.21 @ 07:59) >   Findings compatible with diffuse tumor infiltration of the leftpinna and external auditory canal with evidence of adjacent deep soft tissue infiltration and possible involvement of the superior parotid gland.  No evidence of temporal bone cortical erosion/tumor invasion.  Multiple enlarged/abnormal appearing left cervical lymph nodes strongly suspicious for gracia metastases.

## 2021-10-30 NOTE — CONSULT NOTE ADULT - ASSESSMENT
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 Pt was admitted at [facility] on 10/24 after experiencing syncopal episode and after extensive workup there, is now transferred here for further management. Pt reporting increased "burning" pain of left ear w/ increased foul smelling drainage from left ear lesion despite outpt antibiotics.     Wound cultures from [facility] noted, (collected 10/25), with proteus mirabilis and pseudomonas aeruginosa.   From [facility]IE: Wound cx from 8/30/21: Pan sensitive pseudomonas    IMPRESSION  Fungating Left Ear SCC with PSeudomanas and proteus infection    RECOMMENDATIONs  - afebrile with no leukocytosis and stable vital  -  Pt currently on cefepime  - s/p vancomycin 1500mg x 1 dose  - Left ear discharge cx sent  -     Pt to be seen    Lowell Gamez MD, PGY4   ID fellow  Pager: [phone]  [facility] pager ID: [phone] (would prefer to text page for any new consult or question, please include name/location and best call back number)  After 5pm/weekends call [phone]   Ms Madden is a 71 year old lady with PMH of HTN, DM, CKD, breast ca s/p mastectomy, 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 Pt was admitted at [facility] on 10/24 after experiencing syncopal episode and after extensive workup there, is now transferred here for further management. Pt reporting increased "burning" pain of left ear w/ increased foul smelling drainage from left ear lesion since last few months. Wound cultures from [facility] noted, (collected 10/25), with proteus mirabilis and pseudomonas aeruginosa.    Wound cultures from [facility] noted, (collected 10/25), with proteus mirabilis and pseudomonas aeruginosa.   From [facility]IE: Wound cx from 8/30/21: Pan sensitive pseudomonas    IMPRESSION  Fungating Left Ear SCC with Pseudomanas and proteus infection    RECOMMENDATIONs  - afebrile with no leukocytosis and stable vital  -  Pt currently on cefepime -> Agree with coverage. Continue for now  - s/p vancomycin 1500mg x 1 dose  - Left ear discharge cx sent  -  BLood cx if pt spikes fevers.     Pt seen and examined. Case d/w attending    Lowell Gamez MD, PGY4   ID fellow  Pager: [phone]  [facility] pager ID: [phone] (would prefer to text page for any new consult or question, please include name/location and best call back number)  After 5pm/weekends call [phone]

## 2021-10-30 NOTE — PROGRESS NOTE ADULT - ASSESSMENT
72 yo obese F w/ HTN, HLD, DM2 (A1C 7.9, on insulin therapy), CKD (stage IV), breast ca s/p L mastectomy (~30 years ago per patient), SCC of L ear, transferred from [facility] for further management of L ear malignancy, plan for surgical resection as per ENT.     # Pre-op evaluation  Patient denies active chest pain, no prior hx of ischemic heart disease, EKG reported by ED to have no ischemic changes, patient is euvolemic on exam, no hx of heart failure. She reports able to do 4 METS of activity. Echocardiogram at [facility] revealed EF 60-65%, no wall motion abnormalities. She denies history of CVA. She is diabetic on insulin therapy (A1C 7.9). Patient has CKD IV. She denies hx of smoking or lung disease, is not requiring supplemental O2, and denies history of ZOILA.     RCRI 2 points (diabetes on insulin therapy, renal insufficiency - pre-op Cr >2), corresponding to Class III risk. Given absence of cardiac symptoms, able to do 4 METS and recent echo wnl, anticipate can proceed with surgery without need for further cardiac workup. Patient's blood pressure is better managed with medications, and she will be treated with insulin therapy for her diabetes. Patient is being followed by nephrology for her chronic kidney disease. Anticipate that patient will be optimized for OR with management of comorbid conditions as noted below:     # Essential HTN  - will c/w hydralazine 50 mg tid (was on med at [facility])  - will hold lisinopril (given CKD and episode of hyperkalemia) as per renal  - goal SBP <130 given diabetes, but could consider more lenient BP control given age and reported history of falls  - current SBP (120-130s this AM) in acceptable range    # DM2 c/b hyperglycemia, on insulin therapy  - A1C 7.9 at [facility]  - c/w sliding scale insulin coverage  - FS above goal, in 300s, will increase regimen further  - will increase Lantus from 10U to 20U, increase premeal admelog from 3U to 6U tid.   - goal FS <180, will continue to monitor FS and titrate regimen as needed    # CKD (stage IV)  - ?setting of HTN and diabetes  - patient may have anemia of renal disease (hgb 9.6) patient denies any overt bleeding, could consider checking iron studies/ferritin for further anemia w/u.   - c/b metabolic acidosis, renal input appreciated, c/w sodium bicarb supplementation  - c/b hyperkalemia, K improved s/p temporizing measures and lokelma, continue to monitor, low potassium diet  = avoid nephrotoxins, continue to monitor renal function closely  - off lisinopril as per renal     # hypothyroidism?  - c/w synthroid  - TSH 3.59, freeT4 1.2, wnl    # HLD  - c/w statin    # L ear SCC  - management as per ENT  - pain control/wound care as per ENT  - wound cultures from [facility] noted, (collected 10/25), with proteus mirabilis and pseudomonas aeruginosa  - wound Cx here w/ GNR, followup speciation/sensitivities  - c/w antibiotics as per ID (currently on cefepime, s/p dose vanco)    # Hx falls  - reports use of cane, history of falls  - fall precautions, consider PT eval    VTE ppx: given malignancy, would benefit from pharmacological VTE ppx, would favor use of HSQ given renal dysfunction, c/w HSQ

## 2021-10-30 NOTE — PROGRESS NOTE ADULT - SUBJECTIVE AND OBJECTIVE BOX
ENT Progress Note    HPI: 70yo F w/ pmh inclusive of htn, hcl, DM, CKD, breast ca, being treated outpt by ENT Dr. Gil for left ear malignancy, transferred from [facility] for ENT consult of Left ear infection. Pt was admitted at [facility] on 10/24 after experiencing syncopal episode and after extensive workup there, is now transferred here for ENT mngmt of Left ear malignancy as pt's ENT is out of [facility]. Plan for OR on 11/3 for Right temporal bone resection with Dr. Park and Dr. Gil.    Interval:  10/30: NAEON, sugars continue to be high (in 300s), good pain control, appreciate ID recs; hyperkalemic yesterday s/p lokelma, insulin, and Ca gluconate EKG normal      PAST MEDICAL & SURGICAL HISTORY:  Cancer of ear      Allergies    No Known Allergies    Intolerances      MEDICATIONS  (STANDING):  atorvastatin 40 milliGRAM(s) Oral at bedtime  cefepime   IVPB 2000 milliGRAM(s) IV Intermittent every 12 hours  dextrose 40% Gel 15 Gram(s) Oral once  dextrose 5%. 1000 milliLiter(s) (50 mL/Hr) IV Continuous <Continuous>  dextrose 5%. 1000 milliLiter(s) (100 mL/Hr) IV Continuous <Continuous>  dextrose 50% Injectable 25 Gram(s) IV Push once  dextrose 50% Injectable 12.5 Gram(s) IV Push once  dextrose 50% Injectable 25 Gram(s) IV Push once  glucagon  Injectable 1 milliGRAM(s) IntraMuscular once  heparin   Injectable 5000 Unit(s) SubCutaneous every 12 hours  hydrALAZINE 50 milliGRAM(s) Oral three times a day  influenza  Vaccine (HIGH DOSE) 0.7 milliLiter(s) IntraMuscular once  insulin glargine Injectable (LANTUS) 10 Unit(s) SubCutaneous at bedtime  insulin lispro (ADMELOG) corrective regimen sliding scale   SubCutaneous three times a day before meals  insulin lispro (ADMELOG) corrective regimen sliding scale   SubCutaneous at bedtime  insulin lispro Injectable (ADMELOG) 3 Unit(s) SubCutaneous three times a day before meals  levothyroxine 75 MICROGram(s) Oral daily  sodium bicarbonate 325 milliGRAM(s) Oral two times a day    MEDICATIONS  (PRN):  acetaminophen     Tablet .. 650 milliGRAM(s) Oral every 6 hours PRN Mild Pain (1 - 3)        Vital Signs Last 24 Hrs  T(C): 36.8 (30 Oct 2021 09:54), Max: 37.1 (30 Oct 2021 07:20)  T(F): 98.3 (30 Oct 2021 09:54), Max: 98.7 (30 Oct 2021 07:20)  HR: 66 (30 Oct 2021 09:54) (65 - 75)  BP: 125/63 (30 Oct 2021 09:54) (125/63 - 176/71)  BP(mean): --  RR: 18 (30 Oct 2021 09:54) (18 - 18)  SpO2: 99% (30 Oct 2021 09:54) (98% - 99%)    Physical Exam:   General: NAD, A+Ox3  Respiratory: No respiratory distress, stridor, or stertor  Voice quality: normal  OU:  EOMI  AD: Pinna wnl, EAC clear, TM intact, no effusion  AS: fungating left ear mass w/ necrosed tissue in pinna, and foul smelling purulent drainage  OC/OP: tongue normal, floor of mouth WNL, no masses or lesions, OP clear  Neck: soft/flat, no LAD  Neuro: CNII-XII intact    10-29-21 @ 07:01  -  10-30-21 @ 07:00  --------------------------------------------------------  IN: 480 mL / OUT: 2100 mL / NET: -1620 mL                              8.3    8.58  )-----------( 246      ( 30 Oct 2021 10:12 )             26.2    10-30    132<L>  |  100  |  56<H>  ----------------------------<  301<H>  4.8   |  21<L>  |  2.63<H>    Ca    9.3      30 Oct 2021 10:12  Phos  3.5     10-30  Mg     1.90     10-30    TPro  7.0  /  Alb  3.1<L>  /  TBili  0.3  /  DBili  x   /  AST  36<H>  /  ALT  27  /  AlkPhos  124<H>  10-29   PT/INR - ( 29 Oct 2021 05:01 )   PT: 11.4 sec;   INR: 0.99 ratio         PTT - ( 29 Oct 2021 05:01 )  PTT:32.0 sec      A/P: 71yFemale w/ pmh inclusive of htn, hcl, DM, CKD, breast ca, and fungating ear mass admitted for preop workup and clearance prior to right temporal bone resection on 11/3 with Dr. Park and Dr. Gil  - appreciate renal recs for increased Cr  - appreciate medicine hospitalist input for hyperglycemia and hyperkalemia  - appreciate ID recs: one time vanc dosing, cefepime 2g Q12h  - repletions Mg >2, Phos >3, K >4  - sqh, SCDs  - d/w attending

## 2021-10-31 DIAGNOSIS — R29.6 REPEATED FALLS: ICD-10-CM

## 2021-10-31 DIAGNOSIS — E78.5 HYPERLIPIDEMIA, UNSPECIFIED: ICD-10-CM

## 2021-10-31 DIAGNOSIS — Z01.818 ENCOUNTER FOR OTHER PREPROCEDURAL EXAMINATION: ICD-10-CM

## 2021-10-31 DIAGNOSIS — Z29.9 ENCOUNTER FOR PROPHYLACTIC MEASURES, UNSPECIFIED: ICD-10-CM

## 2021-10-31 DIAGNOSIS — I10 ESSENTIAL (PRIMARY) HYPERTENSION: ICD-10-CM

## 2021-10-31 DIAGNOSIS — E03.9 HYPOTHYROIDISM, UNSPECIFIED: ICD-10-CM

## 2021-10-31 DIAGNOSIS — C44.229 SQUAMOUS CELL CARCINOMA OF SKIN OF LEFT EAR AND EXTERNAL AURICULAR CANAL: ICD-10-CM

## 2021-10-31 DIAGNOSIS — E11.65 TYPE 2 DIABETES MELLITUS WITH HYPERGLYCEMIA: ICD-10-CM

## 2021-10-31 LAB
-  AMIKACIN: SIGNIFICANT CHANGE UP
-  AMIKACIN: SIGNIFICANT CHANGE UP
-  AMOXICILLIN/CLAVULANIC ACID: SIGNIFICANT CHANGE UP
-  AMPICILLIN/SULBACTAM: SIGNIFICANT CHANGE UP
-  AMPICILLIN: SIGNIFICANT CHANGE UP
-  AZTREONAM: SIGNIFICANT CHANGE UP
-  AZTREONAM: SIGNIFICANT CHANGE UP
-  CEFAZOLIN: SIGNIFICANT CHANGE UP
-  CEFEPIME: SIGNIFICANT CHANGE UP
-  CEFEPIME: SIGNIFICANT CHANGE UP
-  CEFOXITIN: SIGNIFICANT CHANGE UP
-  CEFTAZIDIME: SIGNIFICANT CHANGE UP
-  CEFTRIAXONE: SIGNIFICANT CHANGE UP
-  CIPROFLOXACIN: SIGNIFICANT CHANGE UP
-  CIPROFLOXACIN: SIGNIFICANT CHANGE UP
-  ERTAPENEM: SIGNIFICANT CHANGE UP
-  GENTAMICIN: SIGNIFICANT CHANGE UP
-  GENTAMICIN: SIGNIFICANT CHANGE UP
-  IMIPENEM: SIGNIFICANT CHANGE UP
-  LEVOFLOXACIN: SIGNIFICANT CHANGE UP
-  LEVOFLOXACIN: SIGNIFICANT CHANGE UP
-  MEROPENEM: SIGNIFICANT CHANGE UP
-  MEROPENEM: SIGNIFICANT CHANGE UP
-  PIPERACILLIN/TAZOBACTAM: SIGNIFICANT CHANGE UP
-  PIPERACILLIN/TAZOBACTAM: SIGNIFICANT CHANGE UP
-  TOBRAMYCIN: SIGNIFICANT CHANGE UP
-  TOBRAMYCIN: SIGNIFICANT CHANGE UP
-  TRIMETHOPRIM/SULFAMETHOXAZOLE: SIGNIFICANT CHANGE UP
ANION GAP SERPL CALC-SCNC: 11 MMOL/L — SIGNIFICANT CHANGE UP (ref 7–14)
ANION GAP SERPL CALC-SCNC: 11 MMOL/L — SIGNIFICANT CHANGE UP (ref 7–14)
BUN SERPL-MCNC: 58 MG/DL — HIGH (ref 7–23)
BUN SERPL-MCNC: 60 MG/DL — HIGH (ref 7–23)
CALCIUM SERPL-MCNC: 9.1 MG/DL — SIGNIFICANT CHANGE UP (ref 8.4–10.5)
CALCIUM SERPL-MCNC: 9.6 MG/DL — SIGNIFICANT CHANGE UP (ref 8.4–10.5)
CHLORIDE SERPL-SCNC: 100 MMOL/L — SIGNIFICANT CHANGE UP (ref 98–107)
CHLORIDE SERPL-SCNC: 101 MMOL/L — SIGNIFICANT CHANGE UP (ref 98–107)
CO2 SERPL-SCNC: 21 MMOL/L — LOW (ref 22–31)
CO2 SERPL-SCNC: 23 MMOL/L — SIGNIFICANT CHANGE UP (ref 22–31)
CREAT SERPL-MCNC: 2.56 MG/DL — HIGH (ref 0.5–1.3)
CREAT SERPL-MCNC: 2.63 MG/DL — HIGH (ref 0.5–1.3)
CULTURE RESULTS: SIGNIFICANT CHANGE UP
GLUCOSE BLDC GLUCOMTR-MCNC: 263 MG/DL — HIGH (ref 70–99)
GLUCOSE BLDC GLUCOMTR-MCNC: 313 MG/DL — HIGH (ref 70–99)
GLUCOSE BLDC GLUCOMTR-MCNC: 321 MG/DL — HIGH (ref 70–99)
GLUCOSE BLDC GLUCOMTR-MCNC: 356 MG/DL — HIGH (ref 70–99)
GLUCOSE SERPL-MCNC: 327 MG/DL — HIGH (ref 70–99)
GLUCOSE SERPL-MCNC: 345 MG/DL — HIGH (ref 70–99)
HCT VFR BLD CALC: 29.4 % — LOW (ref 34.5–45)
HGB BLD-MCNC: 9.4 G/DL — LOW (ref 11.5–15.5)
MAGNESIUM SERPL-MCNC: 2 MG/DL — SIGNIFICANT CHANGE UP (ref 1.6–2.6)
MAGNESIUM SERPL-MCNC: 2.1 MG/DL — SIGNIFICANT CHANGE UP (ref 1.6–2.6)
MCHC RBC-ENTMCNC: 27.7 PG — SIGNIFICANT CHANGE UP (ref 27–34)
MCHC RBC-ENTMCNC: 32 GM/DL — SIGNIFICANT CHANGE UP (ref 32–36)
MCV RBC AUTO: 86.7 FL — SIGNIFICANT CHANGE UP (ref 80–100)
METHOD TYPE: SIGNIFICANT CHANGE UP
METHOD TYPE: SIGNIFICANT CHANGE UP
NRBC # BLD: 0 /100 WBCS — SIGNIFICANT CHANGE UP
NRBC # FLD: 0 K/UL — SIGNIFICANT CHANGE UP
ORGANISM # SPEC MICROSCOPIC CNT: SIGNIFICANT CHANGE UP
PHOSPHATE SERPL-MCNC: 3.8 MG/DL — SIGNIFICANT CHANGE UP (ref 2.5–4.5)
PHOSPHATE SERPL-MCNC: 3.8 MG/DL — SIGNIFICANT CHANGE UP (ref 2.5–4.5)
PLATELET # BLD AUTO: 260 K/UL — SIGNIFICANT CHANGE UP (ref 150–400)
POTASSIUM SERPL-MCNC: 4.6 MMOL/L — SIGNIFICANT CHANGE UP (ref 3.5–5.3)
POTASSIUM SERPL-MCNC: 5.5 MMOL/L — HIGH (ref 3.5–5.3)
POTASSIUM SERPL-SCNC: 4.6 MMOL/L — SIGNIFICANT CHANGE UP (ref 3.5–5.3)
POTASSIUM SERPL-SCNC: 5.5 MMOL/L — HIGH (ref 3.5–5.3)
RBC # BLD: 3.39 M/UL — LOW (ref 3.8–5.2)
RBC # FLD: 13.8 % — SIGNIFICANT CHANGE UP (ref 10.3–14.5)
SODIUM SERPL-SCNC: 133 MMOL/L — LOW (ref 135–145)
SODIUM SERPL-SCNC: 134 MMOL/L — LOW (ref 135–145)
SPECIMEN SOURCE: SIGNIFICANT CHANGE UP
WBC # BLD: 7.69 K/UL — SIGNIFICANT CHANGE UP (ref 3.8–10.5)
WBC # FLD AUTO: 7.69 K/UL — SIGNIFICANT CHANGE UP (ref 3.8–10.5)

## 2021-10-31 PROCEDURE — 99232 SBSQ HOSP IP/OBS MODERATE 35: CPT

## 2021-10-31 RX ORDER — POLYETHYLENE GLYCOL 3350 17 G/17G
17 POWDER, FOR SOLUTION ORAL DAILY
Refills: 0 | Status: DISCONTINUED | OUTPATIENT
Start: 2021-10-31 | End: 2021-11-04

## 2021-10-31 RX ORDER — INSULIN GLARGINE 100 [IU]/ML
24 INJECTION, SOLUTION SUBCUTANEOUS AT BEDTIME
Refills: 0 | Status: DISCONTINUED | OUTPATIENT
Start: 2021-10-31 | End: 2021-11-01

## 2021-10-31 RX ORDER — INSULIN LISPRO 100/ML
8 VIAL (ML) SUBCUTANEOUS
Refills: 0 | Status: DISCONTINUED | OUTPATIENT
Start: 2021-10-31 | End: 2021-11-01

## 2021-10-31 RX ORDER — SENNA PLUS 8.6 MG/1
2 TABLET ORAL AT BEDTIME
Refills: 0 | Status: DISCONTINUED | OUTPATIENT
Start: 2021-10-31 | End: 2021-11-04

## 2021-10-31 RX ORDER — SODIUM ZIRCONIUM CYCLOSILICATE 10 G/10G
5 POWDER, FOR SUSPENSION ORAL ONCE
Refills: 0 | Status: COMPLETED | OUTPATIENT
Start: 2021-10-31 | End: 2021-10-31

## 2021-10-31 RX ADMIN — Medication 4: at 08:51

## 2021-10-31 RX ADMIN — Medication 325 MILLIGRAM(S): at 06:16

## 2021-10-31 RX ADMIN — INSULIN GLARGINE 24 UNIT(S): 100 INJECTION, SOLUTION SUBCUTANEOUS at 21:53

## 2021-10-31 RX ADMIN — Medication 75 MICROGRAM(S): at 06:16

## 2021-10-31 RX ADMIN — Medication 6 UNIT(S): at 12:33

## 2021-10-31 RX ADMIN — Medication 325 MILLIGRAM(S): at 18:24

## 2021-10-31 RX ADMIN — CEFEPIME 100 MILLIGRAM(S): 1 INJECTION, POWDER, FOR SOLUTION INTRAMUSCULAR; INTRAVENOUS at 18:24

## 2021-10-31 RX ADMIN — Medication 4: at 12:32

## 2021-10-31 RX ADMIN — Medication 50 MILLIGRAM(S): at 21:49

## 2021-10-31 RX ADMIN — Medication 650 MILLIGRAM(S): at 21:47

## 2021-10-31 RX ADMIN — HEPARIN SODIUM 5000 UNIT(S): 5000 INJECTION INTRAVENOUS; SUBCUTANEOUS at 18:24

## 2021-10-31 RX ADMIN — CEFEPIME 100 MILLIGRAM(S): 1 INJECTION, POWDER, FOR SOLUTION INTRAMUSCULAR; INTRAVENOUS at 06:20

## 2021-10-31 RX ADMIN — Medication 50 MILLIGRAM(S): at 12:32

## 2021-10-31 RX ADMIN — Medication 8 UNIT(S): at 18:25

## 2021-10-31 RX ADMIN — Medication 50 MILLIGRAM(S): at 06:16

## 2021-10-31 RX ADMIN — Medication 650 MILLIGRAM(S): at 22:17

## 2021-10-31 RX ADMIN — Medication 3: at 18:25

## 2021-10-31 RX ADMIN — ATORVASTATIN CALCIUM 40 MILLIGRAM(S): 80 TABLET, FILM COATED ORAL at 21:48

## 2021-10-31 RX ADMIN — HEPARIN SODIUM 5000 UNIT(S): 5000 INJECTION INTRAVENOUS; SUBCUTANEOUS at 06:16

## 2021-10-31 RX ADMIN — Medication 6 UNIT(S): at 08:52

## 2021-10-31 RX ADMIN — Medication 3: at 21:53

## 2021-10-31 NOTE — PROGRESS NOTE ADULT - ASSESSMENT
72 yo obese F w/ HTN, HLD, DM2 (A1C 7.9, on insulin therapy), CKD (stage IV), breast ca s/p L mastectomy (~30 years ago per patient), SCC of L ear, transferred from [facility] for further management of L ear malignancy, plan for surgical resection as per ENT.

## 2021-10-31 NOTE — PROGRESS NOTE ADULT - PROBLEM SELECTOR PLAN 1
- management as per ENT  - pain control/wound care as per ENT  - wound cultures from [facility] noted, (collected 10/25), with proteus mirabilis and pseudomonas aeruginosa  - wound Cx here w/ proteus and pseudomonas  - c/w antibiotics as per ID (currently on cefepime, s/p dose vanco)  - plan for OR 11/3 for R temporal bone resection

## 2021-10-31 NOTE — PROGRESS NOTE ADULT - SUBJECTIVE AND OBJECTIVE BOX
ENT Progress Note    HPI: 72yo F w/ pmh inclusive of htn, hcl, DM, CKD, breast ca, being treated outpt by ENT Dr. Gil for left ear malignancy, transferred from [facility] for ENT consult of Left ear infection. Pt was admitted at [facility] on 10/24 after experiencing syncopal episode and after extensive workup there, is now transferred here for ENT mngmt of Left ear malignancy as pt's ENT is out of [facility]. Plan for OR on 11/3 for Right temporal bone resection with Dr. Park and Dr. Gil.    Interval:  10/30: NAEON, sugars continue to be high (in 300s), good pain control, appreciate ID recs; hyperkalemic yesterday s/p lokelma, insulin, and Ca gluconate EKG normal  10/31: NAEON, sugars high even after insulin basal and bolus regimens increased by hospitalist medicine; nose lesion biopsied yesterday; Cr stable, renal on board    PAST MEDICAL & SURGICAL HISTORY:  Cancer of ear      Allergies    No Known Allergies    Intolerances      MEDICATIONS  (STANDING):  atorvastatin 40 milliGRAM(s) Oral at bedtime  cefepime   IVPB 2000 milliGRAM(s) IV Intermittent every 12 hours  dextrose 40% Gel 15 Gram(s) Oral once  dextrose 5%. 1000 milliLiter(s) (50 mL/Hr) IV Continuous <Continuous>  dextrose 5%. 1000 milliLiter(s) (100 mL/Hr) IV Continuous <Continuous>  dextrose 50% Injectable 25 Gram(s) IV Push once  dextrose 50% Injectable 12.5 Gram(s) IV Push once  dextrose 50% Injectable 25 Gram(s) IV Push once  glucagon  Injectable 1 milliGRAM(s) IntraMuscular once  heparin   Injectable 5000 Unit(s) SubCutaneous every 12 hours  hydrALAZINE 50 milliGRAM(s) Oral three times a day  influenza  Vaccine (HIGH DOSE) 0.7 milliLiter(s) IntraMuscular once  insulin glargine Injectable (LANTUS) 10 Unit(s) SubCutaneous at bedtime  insulin lispro (ADMELOG) corrective regimen sliding scale   SubCutaneous three times a day before meals  insulin lispro (ADMELOG) corrective regimen sliding scale   SubCutaneous at bedtime  insulin lispro Injectable (ADMELOG) 3 Unit(s) SubCutaneous three times a day before meals  levothyroxine 75 MICROGram(s) Oral daily  sodium bicarbonate 325 milliGRAM(s) Oral two times a day    MEDICATIONS  (PRN):  acetaminophen     Tablet .. 650 milliGRAM(s) Oral every 6 hours PRN Mild Pain (1 - 3)      Vital Signs Last 24 Hrs  T(C): 36.3 (31 Oct 2021 10:02), Max: 36.9 (30 Oct 2021 14:15)  T(F): 97.4 (31 Oct 2021 10:02), Max: 98.5 (30 Oct 2021 14:15)  HR: 67 (31 Oct 2021 10:02) (65 - 71)  BP: 155/73 (31 Oct 2021 10:02) (132/63 - 190/83)  BP(mean): --  RR: 18 (31 Oct 2021 10:02) (18 - 18)  SpO2: 100% (31 Oct 2021 10:02) (97% - 100%)      Physical Exam:   General: NAD, A+Ox3  Respiratory: No respiratory distress, stridor, or stertor  Voice quality: normal  OU:  EOMI  AD: Pinna wnl, EAC clear, TM intact, no effusion  AS: fungating left ear mass w/ necrosed tissue in pinna, and foul smelling purulent drainage  Nose: ulcerating lesion on left skin of nare; s/p biopsy, small clot/scabbing of lesion  OC/OP: tongue normal, floor of mouth WNL, no masses or lesions, OP clear  Neck: soft/flat, no LAD  Neuro: CNII-XII intact    10-29-21 @ 07:01  -  10-30-21 @ 07:00  --------------------------------------------------------  IN: 480 mL / OUT: 2100 mL / NET: -1620 mL                              8.3    8.58  )-----------( 246      ( 30 Oct 2021 10:12 )             26.2    10-30    132<L>  |  100  |  56<H>  ----------------------------<  301<H>  4.8   |  21<L>  |  2.63<H>    Ca    9.3      30 Oct 2021 10:12  Phos  3.5     10-30  Mg     1.90     10-30    TPro  7.0  /  Alb  3.1<L>  /  TBili  0.3  /  DBili  x   /  AST  36<H>  /  ALT  27  /  AlkPhos  124<H>  10-29   PT/INR - ( 29 Oct 2021 05:01 )   PT: 11.4 sec;   INR: 0.99 ratio         PTT - ( 29 Oct 2021 05:01 )  PTT:32.0 sec      A/P: 71yFemale w/ pmh inclusive of htn, hcl, DM, CKD, breast ca, and fungating ear mass admitted for preop workup and clearance prior to right temporal bone resection on 11/3 with Dr. Park and Dr. Gil  - f/u surgical path  - f/u ID cultures  - appreciate renal recs for increased Cr  - appreciate medicine hospitalist input for hyperglycemia and hyperkalemia; continue q* BMP  - appreciate ID recs: one time vanc dosing, cefepime 2g Q12h  - repletions Mg >2, Phos >3, K >4  - sqh, SCDs  - d/w attending

## 2021-10-31 NOTE — PROGRESS NOTE ADULT - PROBLEM SELECTOR PLAN 3
DM2 c/b hyperglycemia, on insulin therapy  - A1C 7.9 at [facility]  - c/w sliding scale insulin coverage  - FS above goal, in 300s, will increase regimen further  - will increase Lantus from 20U to 24U, increase premeal admelog from 6U to 8U tid.   - goal FS <180, will continue to monitor FS and titrate regimen as needed

## 2021-10-31 NOTE — PROGRESS NOTE ADULT - PROBLEM SELECTOR PLAN 2
Patient denies active chest pain, no prior hx of ischemic heart disease, EKG reported by ED to have no ischemic changes, patient is euvolemic on exam, no hx of heart failure. She reports able to do 4 METS of activity. Echocardiogram at [facility] revealed EF 60-65%, no wall motion abnormalities. She denies history of CVA. She is diabetic on insulin therapy (A1C 7.9). Patient has CKD IV. She denies hx of smoking or lung disease, is not requiring supplemental O2, and denies history of ZOILA.     RCRI 2 points (diabetes on insulin therapy, renal insufficiency - pre-op Cr >2), corresponding to Class III risk. Given absence of cardiac symptoms, able to do 4 METS and recent echo wnl, anticipate can proceed with surgery without need for further cardiac workup. Patient's blood pressure is better managed with medications, and she will be treated with insulin therapy for her diabetes. Patient is being followed by nephrology for her chronic kidney disease. Anticipate that patient will be optimized for OR with management of comorbid conditions as noted below:

## 2021-10-31 NOTE — PROGRESS NOTE ADULT - SUBJECTIVE AND OBJECTIVE BOX
[facility] Medicine  Pager [phone]    Patient is a 71y old  Female who presents with a chief complaint of ear mass, pre op (31 Oct 2021 11:33)      INTERVAL HPI/OVERNIGHT EVENTS:    MEDICATIONS  (STANDING):  atorvastatin 40 milliGRAM(s) Oral at bedtime  cefepime   IVPB 2000 milliGRAM(s) IV Intermittent every 12 hours  dextrose 40% Gel 15 Gram(s) Oral once  dextrose 5%. 1000 milliLiter(s) (50 mL/Hr) IV Continuous <Continuous>  dextrose 5%. 1000 milliLiter(s) (100 mL/Hr) IV Continuous <Continuous>  dextrose 50% Injectable 25 Gram(s) IV Push once  dextrose 50% Injectable 12.5 Gram(s) IV Push once  dextrose 50% Injectable 25 Gram(s) IV Push once  glucagon  Injectable 1 milliGRAM(s) IntraMuscular once  heparin   Injectable 5000 Unit(s) SubCutaneous every 12 hours  hydrALAZINE 50 milliGRAM(s) Oral three times a day  influenza  Vaccine (HIGH DOSE) 0.7 milliLiter(s) IntraMuscular once  insulin glargine Injectable (LANTUS) 20 Unit(s) SubCutaneous at bedtime  insulin lispro (ADMELOG) corrective regimen sliding scale   SubCutaneous three times a day before meals  insulin lispro (ADMELOG) corrective regimen sliding scale   SubCutaneous at bedtime  insulin lispro Injectable (ADMELOG) 6 Unit(s) SubCutaneous three times a day before meals  levothyroxine 75 MICROGram(s) Oral daily  sodium bicarbonate 325 milliGRAM(s) Oral two times a day    MEDICATIONS  (PRN):  acetaminophen     Tablet .. 650 milliGRAM(s) Oral every 6 hours PRN Mild Pain (1 - 3)      Allergies    No Known Allergies    Intolerances        REVIEW OF SYSTEMS:  Please see interval HPI:    Vital Signs Last 24 Hrs  T(C): 36.3 (31 Oct 2021 10:02), Max: 36.9 (30 Oct 2021 14:15)  T(F): 97.4 (31 Oct 2021 10:02), Max: 98.5 (30 Oct 2021 14:15)  HR: 67 (31 Oct 2021 10:02) (65 - 71)  BP: 155/73 (31 Oct 2021 10:02) (132/63 - 190/83)  BP(mean): --  RR: 18 (31 Oct 2021 10:02) (18 - 18)  SpO2: 100% (31 Oct 2021 10:02) (97% - 100%)  I&O's Detail    30 Oct 2021 07:01  -  31 Oct 2021 07:00  --------------------------------------------------------  IN:  Total IN: 0 mL    OUT:    Voided (mL): 2350 mL  Total OUT: 2350 mL    Total NET: -2350 mL      31 Oct 2021 07:01  -  31 Oct 2021 13:30  --------------------------------------------------------  IN:  Total IN: 0 mL    OUT:    Voided (mL): 400 mL  Total OUT: 400 mL    Total NET: -400 mL            PHYSICAL EXAM:  GENERAL:   HEAD:    EYES:   ENMT:   NECK:   NERVOUS SYSTEM:    CHEST/LUNG:   HEART:   ABDOMEN:   EXTREMITIES:    LYMPH:   SKIN:     LABS:                        9.4    7.69  )-----------( 260      ( 31 Oct 2021 06:57 )             29.4     31 Oct 2021 06:57    133    |  101    |  58     ----------------------------<  327    4.6     |  21     |  2.56     Ca    9.6        31 Oct 2021 06:57  Phos  3.8       31 Oct 2021 06:57  Mg     2.10      31 Oct 2021 06:57        CAPILLARY BLOOD GLUCOSE      POCT Blood Glucose.: 321 mg/dL (31 Oct 2021 12:09)  POCT Blood Glucose.: 313 mg/dL (31 Oct 2021 08:39)  POCT Blood Glucose.: 336 mg/dL (30 Oct 2021 22:08)  POCT Blood Glucose.: 234 mg/dL (30 Oct 2021 17:24)    BLOOD CULTURE  10-29 @ 08:30   Moderate Proteus mirabilis  Few Pseudomonas aeruginosa  Numerous Corynebacterium species "Susceptibilities not performed"  --  Proteus mirabilis    RADIOLOGY & ADDITIONAL TESTS:    Imaging Personally Reviewed:  [ ] YES     Consultant(s) Notes Reviewed:      Care Discussed with Consultants/Other Providers: [facility] Medicine  Pager [phone]    Patient is a 71y old  Female who presents with a chief complaint of ear mass, pre op (31 Oct 2021 11:33)      INTERVAL HPI/OVERNIGHT EVENTS:  Complaining of L ear pain, drainage. Is hopeful that procedure will help. Only other complaint at this time is constipation, reports hard balls of stool, none for few days, is able to pass flatus, amenable to bowel regimen.     MEDICATIONS  (STANDING):  atorvastatin 40 milliGRAM(s) Oral at bedtime  cefepime   IVPB 2000 milliGRAM(s) IV Intermittent every 12 hours  dextrose 40% Gel 15 Gram(s) Oral once  dextrose 5%. 1000 milliLiter(s) (50 mL/Hr) IV Continuous <Continuous>  dextrose 5%. 1000 milliLiter(s) (100 mL/Hr) IV Continuous <Continuous>  dextrose 50% Injectable 25 Gram(s) IV Push once  dextrose 50% Injectable 12.5 Gram(s) IV Push once  dextrose 50% Injectable 25 Gram(s) IV Push once  glucagon  Injectable 1 milliGRAM(s) IntraMuscular once  heparin   Injectable 5000 Unit(s) SubCutaneous every 12 hours  hydrALAZINE 50 milliGRAM(s) Oral three times a day  influenza  Vaccine (HIGH DOSE) 0.7 milliLiter(s) IntraMuscular once  insulin glargine Injectable (LANTUS) 20 Unit(s) SubCutaneous at bedtime  insulin lispro (ADMELOG) corrective regimen sliding scale   SubCutaneous three times a day before meals  insulin lispro (ADMELOG) corrective regimen sliding scale   SubCutaneous at bedtime  insulin lispro Injectable (ADMELOG) 6 Unit(s) SubCutaneous three times a day before meals  levothyroxine 75 MICROGram(s) Oral daily  sodium bicarbonate 325 milliGRAM(s) Oral two times a day    MEDICATIONS  (PRN):  acetaminophen     Tablet .. 650 milliGRAM(s) Oral every 6 hours PRN Mild Pain (1 - 3)      Allergies  No Known Allergies    Intolerances    REVIEW OF SYSTEMS:  Please see interval HPI:    Vital Signs Last 24 Hrs  T(C): 36.3 (31 Oct 2021 10:02), Max: 36.9 (30 Oct 2021 14:15)  T(F): 97.4 (31 Oct 2021 10:02), Max: 98.5 (30 Oct 2021 14:15)  HR: 67 (31 Oct 2021 10:02) (65 - 71)  BP: 155/73 (31 Oct 2021 10:02) (132/63 - 190/83)  BP(mean): --  RR: 18 (31 Oct 2021 10:02) (18 - 18)  SpO2: 100% (31 Oct 2021 10:02) (97% - 100%)  I&O's Detail    30 Oct 2021 07:01  -  31 Oct 2021 07:00  --------------------------------------------------------  IN:  Total IN: 0 mL    OUT:    Voided (mL): 2350 mL  Total OUT: 2350 mL    Total NET: -2350 mL      31 Oct 2021 07:01  -  31 Oct 2021 13:30  --------------------------------------------------------  IN:  Total IN: 0 mL    OUT:    Voided (mL): 400 mL  Total OUT: 400 mL    Total NET: -400 mL    PHYSICAL EXAM:  GENERAL: Obese female, lying in bed, in no acute distress  HEAD:  NC/AT  EYES: EOMI, clear sclera/conjunctiva  ENMT: +L ear fungating mass, necrotic tissue, some erythema of cheek, MMM  NECK: supple, non JVD  RESPIRATORY: comfortable on RA, speaking in full sentences, Clear anteriorly  CARDS: S1S2 RRR  GASTROINTESTINAL: obese, soft, non-tender +BS  GENITOURINARY: no baptiste  EXTREMITIES:  no c/c/e  NERVOUS SYSTEM:  moving all extremities, SILT grossly, non-focal  PSYCH: alert, calm, pleasant    LABS:                        9.4    7.69  )-----------( 260      ( 31 Oct 2021 06:57 )             29.4     31 Oct 2021 06:57    133    |  101    |  58     ----------------------------<  327    4.6     |  21     |  2.56     Ca    9.6        31 Oct 2021 06:57  Phos  3.8       31 Oct 2021 06:57  Mg     2.10      31 Oct 2021 06:57    CAPILLARY BLOOD GLUCOSE  POCT Blood Glucose.: 321 mg/dL (31 Oct 2021 12:09)  POCT Blood Glucose.: 313 mg/dL (31 Oct 2021 08:39)  POCT Blood Glucose.: 336 mg/dL (30 Oct 2021 22:08)  POCT Blood Glucose.: 234 mg/dL (30 Oct 2021 17:24)    WOUND CULTURE  10-29 @ 08:30   Moderate Proteus mirabilis  Few Pseudomonas aeruginosa  Numerous Corynebacterium species "Susceptibilities not performed"  --  Proteus mirabilis    RADIOLOGY & ADDITIONAL TESTS:    Imaging Personally Reviewed:  [ ] YES     Consultant(s) Notes Reviewed:  ENT    Care Discussed with Consultants/Other Providers:

## 2021-11-01 LAB
ANION GAP SERPL CALC-SCNC: 10 MMOL/L — SIGNIFICANT CHANGE UP (ref 7–14)
BUN SERPL-MCNC: 66 MG/DL — HIGH (ref 7–23)
CALCIUM SERPL-MCNC: 9.2 MG/DL — SIGNIFICANT CHANGE UP (ref 8.4–10.5)
CHLORIDE SERPL-SCNC: 101 MMOL/L — SIGNIFICANT CHANGE UP (ref 98–107)
CO2 SERPL-SCNC: 21 MMOL/L — LOW (ref 22–31)
CREAT SERPL-MCNC: 2.84 MG/DL — HIGH (ref 0.5–1.3)
GLUCOSE BLDC GLUCOMTR-MCNC: 133 MG/DL — HIGH (ref 70–99)
GLUCOSE BLDC GLUCOMTR-MCNC: 156 MG/DL — HIGH (ref 70–99)
GLUCOSE BLDC GLUCOMTR-MCNC: 238 MG/DL — HIGH (ref 70–99)
GLUCOSE BLDC GLUCOMTR-MCNC: 242 MG/DL — HIGH (ref 70–99)
GLUCOSE SERPL-MCNC: 270 MG/DL — HIGH (ref 70–99)
MAGNESIUM SERPL-MCNC: 2.1 MG/DL — SIGNIFICANT CHANGE UP (ref 1.6–2.6)
PHOSPHATE SERPL-MCNC: 3.5 MG/DL — SIGNIFICANT CHANGE UP (ref 2.5–4.5)
POTASSIUM SERPL-MCNC: 4.9 MMOL/L — SIGNIFICANT CHANGE UP (ref 3.5–5.3)
POTASSIUM SERPL-SCNC: 4.9 MMOL/L — SIGNIFICANT CHANGE UP (ref 3.5–5.3)
SODIUM SERPL-SCNC: 132 MMOL/L — LOW (ref 135–145)

## 2021-11-01 PROCEDURE — 99233 SBSQ HOSP IP/OBS HIGH 50: CPT

## 2021-11-01 PROCEDURE — 99232 SBSQ HOSP IP/OBS MODERATE 35: CPT

## 2021-11-01 PROCEDURE — 99232 SBSQ HOSP IP/OBS MODERATE 35: CPT | Mod: GC

## 2021-11-01 RX ORDER — INSULIN GLARGINE 100 [IU]/ML
28 INJECTION, SOLUTION SUBCUTANEOUS AT BEDTIME
Refills: 0 | Status: DISCONTINUED | OUTPATIENT
Start: 2021-11-01 | End: 2021-11-02

## 2021-11-01 RX ORDER — OXYCODONE HYDROCHLORIDE 5 MG/1
5 TABLET ORAL EVERY 6 HOURS
Refills: 0 | Status: DISCONTINUED | OUTPATIENT
Start: 2021-11-01 | End: 2021-11-04

## 2021-11-01 RX ORDER — INSULIN LISPRO 100/ML
10 VIAL (ML) SUBCUTANEOUS
Refills: 0 | Status: DISCONTINUED | OUTPATIENT
Start: 2021-11-01 | End: 2021-11-04

## 2021-11-01 RX ORDER — CEFEPIME 1 G/1
1000 INJECTION, POWDER, FOR SOLUTION INTRAMUSCULAR; INTRAVENOUS EVERY 12 HOURS
Refills: 0 | Status: DISCONTINUED | OUTPATIENT
Start: 2021-11-01 | End: 2021-11-05

## 2021-11-01 RX ADMIN — Medication 2: at 08:39

## 2021-11-01 RX ADMIN — OXYCODONE HYDROCHLORIDE 5 MILLIGRAM(S): 5 TABLET ORAL at 14:30

## 2021-11-01 RX ADMIN — Medication 325 MILLIGRAM(S): at 05:08

## 2021-11-01 RX ADMIN — Medication 2: at 12:21

## 2021-11-01 RX ADMIN — SODIUM ZIRCONIUM CYCLOSILICATE 5 GRAM(S): 10 POWDER, FOR SUSPENSION ORAL at 01:08

## 2021-11-01 RX ADMIN — Medication 650 MILLIGRAM(S): at 11:35

## 2021-11-01 RX ADMIN — CEFEPIME 100 MILLIGRAM(S): 1 INJECTION, POWDER, FOR SOLUTION INTRAMUSCULAR; INTRAVENOUS at 05:08

## 2021-11-01 RX ADMIN — ATORVASTATIN CALCIUM 40 MILLIGRAM(S): 80 TABLET, FILM COATED ORAL at 21:21

## 2021-11-01 RX ADMIN — INSULIN GLARGINE 28 UNIT(S): 100 INJECTION, SOLUTION SUBCUTANEOUS at 21:48

## 2021-11-01 RX ADMIN — Medication 650 MILLIGRAM(S): at 17:47

## 2021-11-01 RX ADMIN — Medication 650 MILLIGRAM(S): at 18:30

## 2021-11-01 RX ADMIN — Medication 325 MILLIGRAM(S): at 17:23

## 2021-11-01 RX ADMIN — Medication 8 UNIT(S): at 08:38

## 2021-11-01 RX ADMIN — HEPARIN SODIUM 5000 UNIT(S): 5000 INJECTION INTRAVENOUS; SUBCUTANEOUS at 17:22

## 2021-11-01 RX ADMIN — Medication 50 MILLIGRAM(S): at 21:21

## 2021-11-01 RX ADMIN — OXYCODONE HYDROCHLORIDE 5 MILLIGRAM(S): 5 TABLET ORAL at 13:46

## 2021-11-01 RX ADMIN — Medication 650 MILLIGRAM(S): at 04:51

## 2021-11-01 RX ADMIN — Medication 8 UNIT(S): at 12:21

## 2021-11-01 RX ADMIN — HEPARIN SODIUM 5000 UNIT(S): 5000 INJECTION INTRAVENOUS; SUBCUTANEOUS at 05:07

## 2021-11-01 RX ADMIN — CEFEPIME 100 MILLIGRAM(S): 1 INJECTION, POWDER, FOR SOLUTION INTRAMUSCULAR; INTRAVENOUS at 19:39

## 2021-11-01 RX ADMIN — Medication 650 MILLIGRAM(S): at 05:21

## 2021-11-01 RX ADMIN — Medication 75 MICROGRAM(S): at 05:08

## 2021-11-01 RX ADMIN — Medication 50 MILLIGRAM(S): at 05:08

## 2021-11-01 RX ADMIN — Medication 650 MILLIGRAM(S): at 10:57

## 2021-11-01 RX ADMIN — Medication 10 UNIT(S): at 17:21

## 2021-11-01 NOTE — PROGRESS NOTE ADULT - PROBLEM SELECTOR PLAN 3
DM2 c/b hyperglycemia, on insulin therapy  - A1C 7.9 at [facility]  - c/w sliding scale insulin coverage  - FS above goal, in 300s, will increase regimen further  - Increase Lantus to 28U, increase premeal admelog to 10U tid.   - goal FS <180, will continue to monitor FS and titrate regimen as needed

## 2021-11-01 NOTE — PROGRESS NOTE ADULT - PROBLEM SELECTOR PLAN 1
Pt. with advanced CKD stage 4 in setting of longstanding DM and HTN. Scr elevated/stable at 2.84 today. Monitor labs and urine output. Avoid any potential nephrotoxins. Discontinue ACE-I. Dose meds as per eGFR.

## 2021-11-01 NOTE — PROGRESS NOTE ADULT - PROBLEM SELECTOR PLAN 2
Patient denies active chest pain, no prior hx of ischemic heart disease, EKG reported by ED to have no ischemic changes, patient is euvolemic on exam, no hx of heart failure. She reports able to do 4 METS of activity. Echocardiogram at [facility] revealed EF 60-65%, no wall motion abnormalities. She denies history of CVA. She is diabetic on insulin therapy (A1C 7.9). Patient has CKD IV. She denies hx of smoking or lung disease, is not requiring supplemental O2, and denies history of ZOILA.     RCRI 2 points (diabetes on insulin therapy, renal insufficiency - pre-op Cr >2), corresponding to Class III risk. Given absence of cardiac symptoms, able to do 4 METS and recent echo wnl, anticipate can proceed with surgery without need for further cardiac workup. Patient's blood pressure is better managed with medications, and she will be treated with insulin therapy for her diabetes. Patient is being followed by nephrology for her chronic kidney disease. Anticipate that patient will be optimized for OR with management of comorbid conditions as noted below:

## 2021-11-01 NOTE — PROGRESS NOTE ADULT - SUBJECTIVE AND OBJECTIVE BOX
[facility] Division of Hospital Medicine  Malika Briggs DO  Pager (JOSE, 0P-5P): [phone]      Patient is a 71y old  Female who presents with a chief complaint of ear mass, pre op (31 Oct 2021 11:33)      SUBJECTIVE / OVERNIGHT EVENTS: No acute events overnight. Reports left ear pain rated 8/10 and decreased hearing as well as yellowish discharge. Tylenol does help with the pain. No N/V/D    MEDICATIONS  (STANDING):  atorvastatin 40 milliGRAM(s) Oral at bedtime  cefepime   IVPB 2000 milliGRAM(s) IV Intermittent every 12 hours  dextrose 40% Gel 15 Gram(s) Oral once  dextrose 5%. 1000 milliLiter(s) (50 mL/Hr) IV Continuous <Continuous>  dextrose 5%. 1000 milliLiter(s) (100 mL/Hr) IV Continuous <Continuous>  dextrose 50% Injectable 25 Gram(s) IV Push once  dextrose 50% Injectable 12.5 Gram(s) IV Push once  dextrose 50% Injectable 25 Gram(s) IV Push once  glucagon  Injectable 1 milliGRAM(s) IntraMuscular once  heparin   Injectable 5000 Unit(s) SubCutaneous every 12 hours  hydrALAZINE 50 milliGRAM(s) Oral three times a day  influenza  Vaccine (HIGH DOSE) 0.7 milliLiter(s) IntraMuscular once  insulin glargine Injectable (LANTUS) 28 Unit(s) SubCutaneous at bedtime  insulin lispro (ADMELOG) corrective regimen sliding scale   SubCutaneous three times a day before meals  insulin lispro (ADMELOG) corrective regimen sliding scale   SubCutaneous at bedtime  insulin lispro Injectable (ADMELOG) 10 Unit(s) SubCutaneous three times a day before meals  levothyroxine 75 MICROGram(s) Oral daily  polyethylene glycol 3350 17 Gram(s) Oral daily  senna 2 Tablet(s) Oral at bedtime  sodium bicarbonate 325 milliGRAM(s) Oral two times a day    MEDICATIONS  (PRN):  acetaminophen     Tablet .. 650 milliGRAM(s) Oral every 6 hours PRN Mild Pain (1 - 3)      CAPILLARY BLOOD GLUCOSE      POCT Blood Glucose.: 238 mg/dL (01 Nov 2021 12:00)  POCT Blood Glucose.: 242 mg/dL (01 Nov 2021 08:35)  POCT Blood Glucose.: 356 mg/dL (31 Oct 2021 21:42)  POCT Blood Glucose.: 263 mg/dL (31 Oct 2021 17:33)    I&O's Summary    31 Oct 2021 07:01  -  01 Nov 2021 07:00  --------------------------------------------------------  IN: 0 mL / OUT: 400 mL / NET: -400 mL        PHYSICAL EXAM:  Vital Signs Last 24 Hrs  T(C): 36.8 (01 Nov 2021 09:19), Max: 36.8 (31 Oct 2021 14:00)  T(F): 98.2 (01 Nov 2021 09:19), Max: 98.2 (31 Oct 2021 14:00)  HR: 65 (01 Nov 2021 12:06) (65 - 70)  BP: 132/61 (01 Nov 2021 12:06) (93/40 - 175/73)  BP(mean): --  RR: 17 (01 Nov 2021 12:06) (16 - 18)  SpO2: 99% (01 Nov 2021 12:06) (96% - 100%)    GENERAL: Obese female, lying in bed, in no acute distress  HEAD:  NC/AT  EYES: EOMI, clear sclera/conjunctiva  ENMT: +L ear fungating mass, necrotic tissue, some erythema of cheek  RESPIRATORY: comfortable on RA, speaking in full sentences, Clear anteriorly  CARDS: S1S2 RRR  GASTROINTESTINAL: obese, soft, non-tender +BS  EXTREMITIES:  no c/c/e  NERVOUS SYSTEM:  moving all extremities, SILT grossly, non-focal  PSYCH: alert, calm, pleasant    LABS:                        9.4    7.69  )-----------( 260      ( 31 Oct 2021 06:57 )             29.4     11-01    132<L>  |  101  |  66<H>  ----------------------------<  270<H>  4.9   |  21<L>  |  2.84<H>    Ca    9.2      01 Nov 2021 07:14  Phos  3.5     11-01  Mg     2.10     11-01                  RADIOLOGY & ADDITIONAL TESTS:  Results Reviewed:   Imaging Personally Reviewed:  Electrocardiogram Personally Reviewed:    COORDINATION OF CARE:  Care Discussed with Consultants/Other Providers [Y/N]:  Prior or Outpatient Records Reviewed [Y/N]:

## 2021-11-01 NOTE — PROGRESS NOTE ADULT - SUBJECTIVE AND OBJECTIVE BOX
71y old  Female who presents with a chief complaint of ear mass, pre op (31 Oct 2021 11:33)      Interval history:  Afebrile, pain better when she is laying on her left side.       Allergies:   No Known Allergies    Antimicrobials:  cefepime   IVPB 1000 milliGRAM(s) IV Intermittent every 12 hours      REVIEW OF SYSTEMS:  No chest pain   No abdominal pain  No dysuria   No rash.       Vital Signs Last 24 Hrs  T(C): 36.7 (11-01-21 @ 14:02), Max: 36.8 (11-01-21 @ 02:07)  T(F): 98 (11-01-21 @ 14:02), Max: 98.2 (11-01-21 @ 02:07)  HR: 64 (11-01-21 @ 14:02) (64 - 70)  BP: 114/50 (11-01-21 @ 14:02) (93/40 - 175/73)  BP(mean): --  RR: 18 (11-01-21 @ 14:02) (16 - 18)  SpO2: 99% (11-01-21 @ 14:02) (96% - 99%)      PHYSICAL EXAM:  Patient in no acute distress. Alert, awake.   lt ear necrotic changes on the external ear with drainage.   Cardiovascular: S1S2 normal.  Lungs: + air entry B/L lung fields.  Gastrointestinal: soft, nontender, nondistended.  Extremities: no edema.  IV sites not inflamed.                           9.4    7.69  )-----------( 260      ( 31 Oct 2021 06:57 )             29.4   11-01    132<L>  |  101  |  66<H>  ----------------------------<  270<H>  4.9   |  21<L>  |  2.84<H>    Ca    9.2      01 Nov 2021 07:14  Phos  3.5     11-01  Mg     2.10     11-01      Specimen Source: .Other Left ear discharge.   Culture Results:   Moderate Proteus mirabilis   Few Pseudomonas aeruginosa

## 2021-11-01 NOTE — PROGRESS NOTE ADULT - ASSESSMENT
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 Pt was admitted at [facility] on 10/24 after experiencing syncopal episode and after extensive workup there, is now transferred here for further management. Pt reporting increased "burning" pain of left ear w/ increased foul smelling drainage from left ear lesion since last few months. Wound cultures from [facility] noted, (collected 10/25), with proteus mirabilis and pseudomonas aeruginosa.    Wound cultures from [facility] noted, (collected 10/25), with proteus mirabilis and pseudomonas aeruginosa.   From [facility]: Wound cx from 8/30/21: Pan sensitive pseudomonas    IMPRESSION  Fungating Left Ear SCC with concern for superinfection   Pseudomonas and proteus infection, polymicrobial infection.       RECOMMENDATION  - afebrile with no leukocytosis and stable vital  - Pt currently on cefepime -> Agree with coverage. Continue for now, decreased dose to 1 gm q12h   - plan for surgery wednesday per pt.   - Blood cx if pt spikes fevers.         Tan Mariano  Pager: [phone]. If no response or past 5 pm call [phone].

## 2021-11-01 NOTE — PROGRESS NOTE ADULT - SUBJECTIVE AND OBJECTIVE BOX
[facility] DIVISION OF KIDNEY DISEASES AND HYPERTENSION -- FOLLOW UP NOTE  --------------------------------------------------------------------------------  HPI: 71-year-old female with longstanding history of DM (>40 years), HTN, breast cancer, left ear malignancy and CKD was initially admitted to [facility] on 10/24/21 for L ear infection, then transferred to [facility] on 10/29/21 for further ENT management. Planned for lateral temporal bone resection by ENT team. Nephrology consulted for CKD management. On review of labs on [facility]/[place], patient noted to have elevated Scr of 1.57 on 9/24/15. Scr progressively increased over the years and was 2.17 on 9/16/19. Scr was 2.63 on 10/29/21. Scr is at 2.84 today. Pt. has been aware of her kidney disease and saw Dr. Dk Rose few months ago as outpatient in [place], [place].     Pt. seen and examined today.  Pt. feels okay but gives history of left facial/ear pain. Denies SOB, CP, HA, or dizziness.     PAST HISTORY  --------------------------------------------------------------------------------  No significant changes to PMH, PSH, FHx, SHx, unless otherwise noted    ALLERGIES & MEDICATIONS  --------------------------------------------------------------------------------  Allergies    No Known Allergies    Intolerances    Standing Inpatient Medications  atorvastatin 40 milliGRAM(s) Oral at bedtime  cefepime   IVPB 1000 milliGRAM(s) IV Intermittent every 12 hours  dextrose 40% Gel 15 Gram(s) Oral once  dextrose 5%. 1000 milliLiter(s) IV Continuous <Continuous>  dextrose 5%. 1000 milliLiter(s) IV Continuous <Continuous>  dextrose 50% Injectable 25 Gram(s) IV Push once  dextrose 50% Injectable 12.5 Gram(s) IV Push once  dextrose 50% Injectable 25 Gram(s) IV Push once  glucagon  Injectable 1 milliGRAM(s) IntraMuscular once  heparin   Injectable 5000 Unit(s) SubCutaneous every 12 hours  hydrALAZINE 50 milliGRAM(s) Oral three times a day  influenza  Vaccine (HIGH DOSE) 0.7 milliLiter(s) IntraMuscular once  insulin glargine Injectable (LANTUS) 28 Unit(s) SubCutaneous at bedtime  insulin lispro (ADMELOG) corrective regimen sliding scale   SubCutaneous three times a day before meals  insulin lispro (ADMELOG) corrective regimen sliding scale   SubCutaneous at bedtime  insulin lispro Injectable (ADMELOG) 10 Unit(s) SubCutaneous three times a day before meals  levothyroxine 75 MICROGram(s) Oral daily  polyethylene glycol 3350 17 Gram(s) Oral daily  senna 2 Tablet(s) Oral at bedtime  sodium bicarbonate 325 milliGRAM(s) Oral two times a day    PRN Inpatient Medications  acetaminophen     Tablet .. 650 milliGRAM(s) Oral every 6 hours PRN  oxyCODONE    IR 5 milliGRAM(s) Oral every 6 hours PRN    REVIEW OF SYSTEMS  --------------------------------------------------------------------------------  Gen: No fevers , L ear pain +  Respiratory: No dyspnea  CV: No chest pain  GI: No abdominal pain  : No dysuria  MSK: No  edema  Neuro: no dizziness     All other systems were reviewed and are negative, except as noted.    VITALS/PHYSICAL EXAM  --------------------------------------------------------------------------------  T(C): 36.7 (11-01-21 @ 14:02), Max: 36.8 (11-01-21 @ 02:07)  HR: 64 (11-01-21 @ 14:02) (64 - 70)  BP: 114/50 (11-01-21 @ 14:02) (93/40 - 175/73)  RR: 18 (11-01-21 @ 14:02) (16 - 18)  SpO2: 99% (11-01-21 @ 14:02) (96% - 99%)  Wt(kg): --    10-31-21 @ 07:01  -  11-01-21 @ 07:00  --------------------------------------------------------  IN: 0 mL / OUT: 400 mL / NET: -400 mL    11-01-21 @ 07:01  -  11-01-21 @ 14:52  --------------------------------------------------------  IN: 0 mL / OUT: 700 mL / NET: -700 mL    Physical Exam:  	Gen: resting, NAD  	HEENT: L ear with large hole and necrotic/infected lesion seen  	Pulm: CTA B/L, no crackles   	CV: S1S2+  	Abd: Soft, +BS   	Ext: No LE edema B/L  	Neuro: Awake, alert  	Skin: Warm and dry    LABS/STUDIES  --------------------------------------------------------------------------------              9.4    7.69  >-----------<  260      [10-31-21 @ 06:57]              29.4     132  |  101  |  66  ----------------------------<  270      [11-01-21 @ 07:14]  4.9   |  21  |  2.84        Ca     9.2     [11-01-21 @ 07:14]      Mg     2.10     [11-01-21 @ 07:14]      Phos  3.5     [11-01-21 @ 07:14]    Creatinine Trend:  SCr 2.84 [11-01 @ 07:14]  SCr 2.63 [10-31 @ 19:46]  SCr 2.56 [10-31 @ 06:57]  SCr 2.63 [10-30 @ 10:12]  SCr 2.68 [10-29 @ 23:32]    TSH 3.59      [10-30-21 @ 06:43]    HCV 0.20, Nonreact      [10-30-21 @ 10:37]

## 2021-11-01 NOTE — PROGRESS NOTE ADULT - SUBJECTIVE AND OBJECTIVE BOX
[facility] DIVISION OF KIDNEY DISEASES AND HYPERTENSION -- FOLLOW UP NOTE  --------------------------------------------------------------------------------  HPI: 71-year-old female with longstanding history of DM (>40 years), HTN, breast cancer, left ear malignancy and CKD stage 4 was initially admitted to [facility] on 10/24/21 for L ear infection, then transferred to [facility] on 10/29/21 for further ENT management. Pt. Planned for lateral temporal bone resection by ENT team. Nephrology consulted for CKD management. On review of labs on [facility]/[place], patient noted to have elevated Scr of 1.57 on 9/24/15. Scr progressively increased over the years and was 2.17 on 9/16/19. Scr was 2.63 on 10/29/21. Repeat labs done today showed Scr level of 2.45. Pt. seen and examined today. Pt. has been aware of her kidney disease and saw Dr. Dk Rose few months ago as outpatient in [place], [place]. Pt. feels okay but gives history of left facial/ear pain.     PAST HISTORY  --------------------------------------------------------------------------------  No significant changes to PMH, PSH, FHx, SHx, unless otherwise noted    ALLERGIES & MEDICATIONS  --------------------------------------------------------------------------------  Allergies    No Known Allergies    Intolerances    Standing Inpatient Medications  atorvastatin 40 milliGRAM(s) Oral at bedtime  cefepime   IVPB 2000 milliGRAM(s) IV Intermittent every 12 hours  dextrose 40% Gel 15 Gram(s) Oral once  dextrose 5%. 1000 milliLiter(s) IV Continuous <Continuous>  dextrose 5%. 1000 milliLiter(s) IV Continuous <Continuous>  dextrose 50% Injectable 25 Gram(s) IV Push once  dextrose 50% Injectable 12.5 Gram(s) IV Push once  dextrose 50% Injectable 25 Gram(s) IV Push once  glucagon  Injectable 1 milliGRAM(s) IntraMuscular once  heparin   Injectable 5000 Unit(s) SubCutaneous every 12 hours  hydrALAZINE 50 milliGRAM(s) Oral three times a day  influenza  Vaccine (HIGH DOSE) 0.7 milliLiter(s) IntraMuscular once  insulin glargine Injectable (LANTUS) 24 Unit(s) SubCutaneous at bedtime  insulin lispro (ADMELOG) corrective regimen sliding scale   SubCutaneous three times a day before meals  insulin lispro (ADMELOG) corrective regimen sliding scale   SubCutaneous at bedtime  insulin lispro Injectable (ADMELOG) 8 Unit(s) SubCutaneous three times a day before meals  levothyroxine 75 MICROGram(s) Oral daily  polyethylene glycol 3350 17 Gram(s) Oral daily  senna 2 Tablet(s) Oral at bedtime  sodium bicarbonate 325 milliGRAM(s) Oral two times a day    PRN Inpatient Medications  acetaminophen     Tablet .. 650 milliGRAM(s) Oral every 6 hours PRN    REVIEW OF SYSTEMS  --------------------------------------------------------------------------------  Gen: No fevers/chills  HEENT: See HPI, L ear/facial pain +   Respiratory: No dyspnea  CV: No chest pain  GI: No abdominal pain, diarrhea  : No dysuria, hematuria  MSK: No LE edema    All other systems were reviewed and are negative, except as noted.    VITALS/PHYSICAL EXAM  --------------------------------------------------------------------------------  T(C): 36.6 (11-01-21 @ 05:05), Max: 36.8 (10-31-21 @ 14:00)  HR: 65 (11-01-21 @ 05:05) (65 - 70)  BP: 126/60 (11-01-21 @ 05:05) (126/60 - 175/73)  RR: 17 (11-01-21 @ 05:05) (16 - 18)  SpO2: 97% (11-01-21 @ 05:05) (97% - 100%)  Wt(kg): --    10-31-21 @ 07:01  -  11-01-21 @ 07:00  --------------------------------------------------------  IN: 0 mL / OUT: 400 mL / NET: -400 mL    Physical Exam:  	Gen: NAD  	Gen: resting, NAD  	HEENT: L ear with large hole and necrotic/infected lesion seen  	Pulm: CTA B/L, no crackles   	CV: S1S2+  	Abd: Soft, +BS   	Ext: No LE edema B/L  	Neuro: Awake, alert  	Skin: Warm and dry    LABS/STUDIES  --------------------------------------------------------------------------------              9.4    7.69  >-----------<  260      [10-31-21 @ 06:57]              29.4     132  |  101  |  66  ----------------------------<  270      [11-01-21 @ 07:14]  4.9   |  21  |  2.84        Ca     9.2     [11-01-21 @ 07:14]      Mg     2.10     [11-01-21 @ 07:14]      Phos  3.5     [11-01-21 @ 07:14]    Creatinine Trend:  SCr 2.84 [11-01 @ 07:14]  SCr 2.63 [10-31 @ 19:46]  SCr 2.56 [10-31 @ 06:57]  SCr 2.63 [10-30 @ 10:12]  SCr 2.68 [10-29 @ 23:32]    TSH 3.59      [10-30-21 @ 06:43]    HCV 0.20, Nonreact      [10-30-21 @ 10:37]

## 2021-11-01 NOTE — PROGRESS NOTE ADULT - PROBLEM SELECTOR PLAN 2
Pt. with metabolic acidosis in setting of CKD. Started on oral sodium bicarbonate 325 mg PO BID. serum sodium bicarb is at 21 today. Monitor serum CO2.    If you have any questions, please feel free to contact me  Kimani Neff  Nephrology Fellow  [phone]  (After 5pm or on weekends please page the on-call fellow).

## 2021-11-01 NOTE — PROGRESS NOTE ADULT - ASSESSMENT
70 yo obese F w/ HTN, HLD, DM2 (A1C 7.9, on insulin therapy), CKD (stage IV), breast ca s/p L mastectomy (~30 years ago per patient), SCC of L ear, transferred from [facility] for further management of L ear malignancy, plan for surgical resection as per ENT.

## 2021-11-02 PROBLEM — C44.201: Chronic | Status: ACTIVE | Noted: 2021-10-29

## 2021-11-02 LAB
A1C WITH ESTIMATED AVERAGE GLUCOSE RESULT: 7.8 % — HIGH (ref 4–5.6)
ANION GAP SERPL CALC-SCNC: 11 MMOL/L — SIGNIFICANT CHANGE UP (ref 7–14)
BLD GP AB SCN SERPL QL: NEGATIVE — SIGNIFICANT CHANGE UP
BUN SERPL-MCNC: 71 MG/DL — HIGH (ref 7–23)
CALCIUM SERPL-MCNC: 9.5 MG/DL — SIGNIFICANT CHANGE UP (ref 8.4–10.5)
CHLORIDE SERPL-SCNC: 101 MMOL/L — SIGNIFICANT CHANGE UP (ref 98–107)
CO2 SERPL-SCNC: 22 MMOL/L — SIGNIFICANT CHANGE UP (ref 22–31)
CREAT SERPL-MCNC: 3.01 MG/DL — HIGH (ref 0.5–1.3)
ESTIMATED AVERAGE GLUCOSE: 177 — SIGNIFICANT CHANGE UP
GLUCOSE BLDC GLUCOMTR-MCNC: 130 MG/DL — HIGH (ref 70–99)
GLUCOSE BLDC GLUCOMTR-MCNC: 148 MG/DL — HIGH (ref 70–99)
GLUCOSE BLDC GLUCOMTR-MCNC: 170 MG/DL — HIGH (ref 70–99)
GLUCOSE BLDC GLUCOMTR-MCNC: 181 MG/DL — HIGH (ref 70–99)
GLUCOSE SERPL-MCNC: 131 MG/DL — HIGH (ref 70–99)
MAGNESIUM SERPL-MCNC: 2.2 MG/DL — SIGNIFICANT CHANGE UP (ref 1.6–2.6)
PHOSPHATE SERPL-MCNC: 4.1 MG/DL — SIGNIFICANT CHANGE UP (ref 2.5–4.5)
POTASSIUM SERPL-MCNC: 5.1 MMOL/L — SIGNIFICANT CHANGE UP (ref 3.5–5.3)
POTASSIUM SERPL-SCNC: 5.1 MMOL/L — SIGNIFICANT CHANGE UP (ref 3.5–5.3)
RH IG SCN BLD-IMP: POSITIVE — SIGNIFICANT CHANGE UP
SARS-COV-2 RNA SPEC QL NAA+PROBE: SIGNIFICANT CHANGE UP
SODIUM SERPL-SCNC: 134 MMOL/L — LOW (ref 135–145)

## 2021-11-02 PROCEDURE — 99232 SBSQ HOSP IP/OBS MODERATE 35: CPT

## 2021-11-02 PROCEDURE — 99233 SBSQ HOSP IP/OBS HIGH 50: CPT | Mod: GC

## 2021-11-02 RX ORDER — SODIUM CHLORIDE 9 MG/ML
1000 INJECTION, SOLUTION INTRAVENOUS
Refills: 0 | Status: DISCONTINUED | OUTPATIENT
Start: 2021-11-02 | End: 2021-11-02

## 2021-11-02 RX ORDER — INSULIN GLARGINE 100 [IU]/ML
20 INJECTION, SOLUTION SUBCUTANEOUS AT BEDTIME
Refills: 0 | Status: DISCONTINUED | OUTPATIENT
Start: 2021-11-02 | End: 2021-11-10

## 2021-11-02 RX ORDER — SODIUM CHLORIDE 9 MG/ML
1000 INJECTION, SOLUTION INTRAVENOUS
Refills: 0 | Status: DISCONTINUED | OUTPATIENT
Start: 2021-11-02 | End: 2021-11-04

## 2021-11-02 RX ORDER — OXYCODONE HYDROCHLORIDE 5 MG/1
2.5 TABLET ORAL EVERY 6 HOURS
Refills: 0 | Status: DISCONTINUED | OUTPATIENT
Start: 2021-11-02 | End: 2021-11-04

## 2021-11-02 RX ADMIN — Medication 10 UNIT(S): at 17:19

## 2021-11-02 RX ADMIN — INSULIN GLARGINE 20 UNIT(S): 100 INJECTION, SOLUTION SUBCUTANEOUS at 21:39

## 2021-11-02 RX ADMIN — Medication 50 MILLIGRAM(S): at 05:19

## 2021-11-02 RX ADMIN — SENNA PLUS 2 TABLET(S): 8.6 TABLET ORAL at 21:38

## 2021-11-02 RX ADMIN — Medication 10 UNIT(S): at 12:28

## 2021-11-02 RX ADMIN — CEFEPIME 100 MILLIGRAM(S): 1 INJECTION, POWDER, FOR SOLUTION INTRAMUSCULAR; INTRAVENOUS at 04:26

## 2021-11-02 RX ADMIN — Medication 50 MILLIGRAM(S): at 13:08

## 2021-11-02 RX ADMIN — Medication 1: at 12:27

## 2021-11-02 RX ADMIN — Medication 650 MILLIGRAM(S): at 06:59

## 2021-11-02 RX ADMIN — ATORVASTATIN CALCIUM 40 MILLIGRAM(S): 80 TABLET, FILM COATED ORAL at 21:38

## 2021-11-02 RX ADMIN — Medication 650 MILLIGRAM(S): at 15:45

## 2021-11-02 RX ADMIN — Medication 650 MILLIGRAM(S): at 07:30

## 2021-11-02 RX ADMIN — Medication 325 MILLIGRAM(S): at 17:18

## 2021-11-02 RX ADMIN — HEPARIN SODIUM 5000 UNIT(S): 5000 INJECTION INTRAVENOUS; SUBCUTANEOUS at 17:18

## 2021-11-02 RX ADMIN — Medication 50 MILLIGRAM(S): at 21:38

## 2021-11-02 RX ADMIN — Medication 650 MILLIGRAM(S): at 15:07

## 2021-11-02 RX ADMIN — Medication 325 MILLIGRAM(S): at 05:20

## 2021-11-02 RX ADMIN — Medication 75 MICROGRAM(S): at 05:18

## 2021-11-02 RX ADMIN — CEFEPIME 100 MILLIGRAM(S): 1 INJECTION, POWDER, FOR SOLUTION INTRAMUSCULAR; INTRAVENOUS at 17:18

## 2021-11-02 RX ADMIN — POLYETHYLENE GLYCOL 3350 17 GRAM(S): 17 POWDER, FOR SOLUTION ORAL at 13:08

## 2021-11-02 RX ADMIN — HEPARIN SODIUM 5000 UNIT(S): 5000 INJECTION INTRAVENOUS; SUBCUTANEOUS at 05:18

## 2021-11-02 RX ADMIN — Medication 10 UNIT(S): at 08:42

## 2021-11-02 NOTE — PROGRESS NOTE ADULT - PROBLEM SELECTOR PLAN 2
Patient denies active chest pain, no prior hx of ischemic heart disease, EKG reported by ED to have no ischemic changes, patient is euvolemic on exam, no hx of heart failure. She reports able to do 4 METS of activity. Echocardiogram at [facility] revealed EF 60-65%, no wall motion abnormalities. She denies history of CVA. She is diabetic on insulin therapy (A1C 7.9). Patient has CKD IV. She denies hx of smoking or lung disease, is not requiring supplemental O2, and denies history of ZOLIA.     RCRI 2 points (diabetes on insulin therapy, renal insufficiency - pre-op Cr >2), corresponding to Class III risk. Given absence of cardiac symptoms, able to do 4 METS and recent echo wnl, anticipate can proceed with surgery without need for further cardiac workup. Patient's blood pressure is better managed with medications, and she will be treated with insulin therapy for her diabetes. Patient is being followed by nephrology for her chronic kidney disease. Anticipate that patient will be optimized for OR with management of comorbid conditions as noted below:

## 2021-11-02 NOTE — PROGRESS NOTE ADULT - PROBLEM SELECTOR PLAN 2
Pt. with metabolic acidosis in setting of CKD. Started on oral sodium bicarbonate 325 mg PO BID. serum sodium bicarb is at 22 today. Monitor serum CO2.    If you have any questions, please feel free to contact me  Kimani Neff  Nephrology Fellow  [phone]  (After 5pm or on weekends please page the on-call fellow).

## 2021-11-02 NOTE — PROGRESS NOTE ADULT - SUBJECTIVE AND OBJECTIVE BOX
HPI: 72yo F w/ pmh inclusive of htn, hcl, DM, CKD, breast ca, being treated outpt by ENT Dr. Gil for left ear malignancy, transferred from [facility] for ENT consult of Left ear infection. Pt was admitted at [facility] on 10/24 after experiencing syncopal episode and after extensive workup there, is now transferred here for ENT mngmt of Left ear malignancy. Plan for OR on 11/3 for Right temporal bone resection with Dr. Park and Dr. Gil.    Interval:  nose path still pending  no acute events overnight.       Vital Signs Last 24 Hrs  T(C): 36.2 (02 Nov 2021 05:02), Max: 36.8 (01 Nov 2021 09:19)  T(F): 97.2 (02 Nov 2021 05:02), Max: 98.2 (01 Nov 2021 09:19)  HR: 59 (02 Nov 2021 05:02) (59 - 66)  BP: 128/64 (02 Nov 2021 05:02) (93/40 - 145/71)  BP(mean): --  RR: 17 (02 Nov 2021 05:02) (17 - 18)  SpO2: 98% (02 Nov 2021 05:02) (96% - 99%)  Physical Exam:   General: NAD, A+Ox3  Respiratory: No respiratory distress, stridor, or stertor  Voice quality: normal  OU:  EOMI  AD: Pinna wnl, EAC clear, TM intact, no effusion  AS: fungating left ear mass w/ necrosed tissue in pinna, and foul smelling purulent drainage  Nose: ulcerating lesion on left skin of nare; s/p biopsy, small clot/scabbing of lesion  OC/OP: tongue normal, floor of mouth WNL, no masses or lesions, OP clear  Neck: soft/flat, no LAD    A/P: 71yFemale w/ pmh inclusive of htn, hcl, DM, CKD, breast ca, and fungating ear mass admitted for preop workup and clearance prior to right temporal bone resection on 11/3 with Dr. Park and Dr. Gil  - f/u surgical path  - f/u ID cultures  - appreciate renal recs for increased Cr, as well as renal optimization before OR  - appreciate medicine hospitalist input/recs: continue q12h BMP  - appreciate ID recs: one time vanc dosing, cefepime 2g Q12h  - repletions Mg >2, Phos >3, K >4  - sqh, SCDs           HPI: 72yo F w/ pmh inclusive of htn, hcl, DM, CKD, breast ca, being treated outpt by ENT Dr. Gil for left ear malignancy, transferred from [facility] for ENT consult of Left ear infection. Pt was admitted at [facility] on 10/24 after experiencing syncopal episode and after extensive workup there, is now transferred here for ENT mngmt of Left ear malignancy. Plan for OR on 11/3 for L temporal bone resection with Dr. Park and Dr. Gil.    Interval:  nose path still pending  no acute events overnight.       Vital Signs Last 24 Hrs  T(C): 36.2 (02 Nov 2021 05:02), Max: 36.8 (01 Nov 2021 09:19)  T(F): 97.2 (02 Nov 2021 05:02), Max: 98.2 (01 Nov 2021 09:19)  HR: 59 (02 Nov 2021 05:02) (59 - 66)  BP: 128/64 (02 Nov 2021 05:02) (93/40 - 145/71)  BP(mean): --  RR: 17 (02 Nov 2021 05:02) (17 - 18)  SpO2: 98% (02 Nov 2021 05:02) (96% - 99%)  Physical Exam:   General: NAD, A+Ox3  Respiratory: No respiratory distress, stridor, or stertor  Voice quality: normal  OU:  EOMI  AD: Pinna wnl, EAC clear, TM intact, no effusion  AS: fungating left ear mass w/ necrosed tissue in pinna, and foul smelling purulent drainage  Nose: ulcerating lesion on left skin of nare; s/p biopsy, small clot/scabbing of lesion  OC/OP: tongue normal, floor of mouth WNL, no masses or lesions, OP clear  Neck: soft/flat, no LAD    A/P: 71yFemale w/ pmh inclusive of htn, hcl, DM, CKD, breast ca, and fungating ear mass admitted for preop workup and clearance prior to right temporal bone resection on 11/3 with Dr. Park and Dr. Gil  - f/u surgical path  - f/u ID cultures  - appreciate renal recs for increased Cr, as well as renal optimization before OR  - appreciate medicine hospitalist input/recs: continue q12h BMP  - appreciate ID recs: one time vanc dosing, cefepime 2g Q12h  - repletions Mg >2, Phos >3, K >4  - sqh, SCDs           HPI: 72yo F w/ pmh inclusive of htn, hcl, DM, CKD, breast ca, being treated outpt by ENT Dr. Gil for left ear malignancy, transferred from [facility] for ENT consult of Left ear infection. Pt was admitted at [facility] on 10/24 after experiencing syncopal episode and after extensive workup there, is now transferred here for ENT mngmt of Left ear malignancy. Plan for OR on 11/3 for L temporal bone resection with Dr. Park and Dr. Gil.    Interval:  nose path still pending  no acute events overnight.       Vital Signs Last 24 Hrs  T(C): 36.2 (02 Nov 2021 05:02), Max: 36.8 (01 Nov 2021 09:19)  T(F): 97.2 (02 Nov 2021 05:02), Max: 98.2 (01 Nov 2021 09:19)  HR: 59 (02 Nov 2021 05:02) (59 - 66)  BP: 128/64 (02 Nov 2021 05:02) (93/40 - 145/71)  BP(mean): --  RR: 17 (02 Nov 2021 05:02) (17 - 18)  SpO2: 98% (02 Nov 2021 05:02) (96% - 99%)  Physical Exam:   General: NAD, A+Ox3  Respiratory: No respiratory distress, stridor, or stertor  Voice quality: normal  OU:  EOMI  AD: Pinna wnl, EAC clear, TM intact, no effusion  AS: fungating left ear mass w/ necrosed tissue in pinna, and foul smelling purulent drainage  Nose: ulcerating lesion on left skin of nare; s/p biopsy, small clot/scabbing of lesion  OC/OP: tongue normal, floor of mouth WNL, no masses or lesions, OP clear  Neck: soft/flat, no LAD    A/P: 71yFemale w/ pmh inclusive of htn, hcl, DM, CKD, breast ca, and fungating ear mass admitted for preop workup and clearance prior to left temporal bone resection on 11/3 with Dr. Park and Dr. Gil  - f/u surgical path  - f/u ID cultures  - appreciate renal recs for increased Cr, as well as renal optimization before OR  - appreciate medicine hospitalist input/recs: continue q12h BMP  - appreciate ID recs: one time vanc dosing, cefepime 2g Q12h  - repletions Mg >2, Phos >3, K >4  - sqh, SCDs

## 2021-11-02 NOTE — PROGRESS NOTE ADULT - PROBLEM SELECTOR PLAN 3
DM2 c/b hyperglycemia, on insulin therapy  - A1C 7.9 at [place]  - c/w sliding scale insulin coverage  - Decrease Lantus to 20U from 28U for NPO after MN, c/w admelog 10U tid premeals but hold when NPO  - goal FS <180, will continue to monitor FS and titrate regimen as needed

## 2021-11-02 NOTE — PROGRESS NOTE ADULT - ASSESSMENT
72 yo obese F w/ HTN, HLD, DM2 (A1C 7.9, on insulin therapy), CKD (stage IV), breast ca s/p L mastectomy (~30 years ago per patient), SCC of L ear, transferred from [facility] for further management of L ear malignancy, plan for surgical resection as per ENT.

## 2021-11-02 NOTE — PROGRESS NOTE ADULT - PROBLEM SELECTOR PLAN 9
VTE ppx: given malignancy, would benefit from pharmacological VTE ppx, would favor use of HSQ given renal dysfunction, c/w HSQ
VTE ppx: given malignancy, would benefit from pharmacological VTE ppx, c/w HSQ given renal dysfunction, c/w HSQ  consistent carb/DASH/low potassium diet
VTE ppx: given malignancy, would benefit from pharmacological VTE ppx, c/w HSQ given renal dysfunction, c/w HSQ  consistent carb/DASH/low potassium diet

## 2021-11-02 NOTE — PROGRESS NOTE ADULT - PROBLEM SELECTOR PLAN 1
- management as per ENT  - pain control/wound care as per ENT  - wound cultures from [facility] noted, (collected 10/25), with proteus mirabilis and pseudomonas aeruginosa  - wound Cx here w/ proteus and pseudomonas  - c/w antibiotics as per ID (currently on cefepime, s/p dose vanco)  - plan for OR 11/3 for left temporal bone resection, left auriculectomy per ENT

## 2021-11-02 NOTE — PROGRESS NOTE ADULT - SUBJECTIVE AND OBJECTIVE BOX
CHIEF COMPLAINT: f/u     SUBJECTIVE / OVERNIGHT EVENTS: No acute events overnight. Pain well controlled and patient without any new complaints. No N/V/D. Last BM was last night    MEDICATIONS  (STANDING):  atorvastatin 40 milliGRAM(s) Oral at bedtime  cefepime   IVPB 1000 milliGRAM(s) IV Intermittent every 12 hours  dextrose 40% Gel 15 Gram(s) Oral once  dextrose 5% + sodium chloride 0.45%. 1000 milliLiter(s) (100 mL/Hr) IV Continuous <Continuous>  dextrose 5%. 1000 milliLiter(s) (50 mL/Hr) IV Continuous <Continuous>  dextrose 5%. 1000 milliLiter(s) (100 mL/Hr) IV Continuous <Continuous>  dextrose 50% Injectable 25 Gram(s) IV Push once  dextrose 50% Injectable 12.5 Gram(s) IV Push once  dextrose 50% Injectable 25 Gram(s) IV Push once  glucagon  Injectable 1 milliGRAM(s) IntraMuscular once  heparin   Injectable 5000 Unit(s) SubCutaneous every 12 hours  hydrALAZINE 50 milliGRAM(s) Oral three times a day  influenza  Vaccine (HIGH DOSE) 0.7 milliLiter(s) IntraMuscular once  insulin glargine Injectable (LANTUS) 20 Unit(s) SubCutaneous at bedtime  insulin lispro (ADMELOG) corrective regimen sliding scale   SubCutaneous three times a day before meals  insulin lispro (ADMELOG) corrective regimen sliding scale   SubCutaneous at bedtime  insulin lispro Injectable (ADMELOG) 10 Unit(s) SubCutaneous three times a day before meals  levothyroxine 75 MICROGram(s) Oral daily  polyethylene glycol 3350 17 Gram(s) Oral daily  senna 2 Tablet(s) Oral at bedtime  sodium bicarbonate 325 milliGRAM(s) Oral two times a day    MEDICATIONS  (PRN):  acetaminophen     Tablet .. 650 milliGRAM(s) Oral every 6 hours PRN Mild Pain (1 - 3)  oxyCODONE    IR 5 milliGRAM(s) Oral every 6 hours PRN Severe Pain (7 - 10)  oxyCODONE    IR 2.5 milliGRAM(s) Oral every 6 hours PRN Moderate Pain (4 - 6)      VITALS:  T(F): 97.6 (11-02-21 @ 09:33), Max: 98 (11-01-21 @ 14:02)  HR: 67 (11-02-21 @ 09:33) (59 - 67)  BP: 150/66 (11-02-21 @ 09:33) (114/50 - 150/66)  RR: 18 (11-02-21 @ 09:33) (17 - 18)  SpO2: 99% (11-02-21 @ 09:33)  Wt(kg): --      CAPILLARY BLOOD GLUCOSE      PHYSICAL EXAM:  GENERAL: Obese female, lying in bed, in no acute distress  HEAD:  NC/AT  EYES: EOMI, clear sclera/conjunctiva  ENMT: +L ear fungating mass, necrotic tissue  RESPIRATORY: comfortable on RA, speaking in full sentences, Clear anteriorly  CARDS: S1S2 RRR  GASTROINTESTINAL: obese, soft, non-tender +BS  EXTREMITIES:  no c/c/e  NERVOUS SYSTEM:  moving all extremities, SILT grossly, non-focal  PSYCH: alert, calm, pleasant    LABS:              x                    134  | 22   | 71           x     >-----------< x       ------------------------< 131                   x                    5.1  | 101  | 3.01                                         Ca 9.5   Mg 2.20  Ph 4.1                        MICROBIOLOGY:        RADIOLOGY & ADDITIONAL TESTS:  Imaging Personally Reviewed: [ ] Yes    [ ] Consultant(s) Notes Reviewed:  [x] Care Discussed with Consultants/Other Providers:

## 2021-11-02 NOTE — PROGRESS NOTE ADULT - PROBLEM SELECTOR PLAN 1
Pt. with advanced CKD stage 4 in setting of longstanding DM and HTN. [facility], patient noted to have elevated Scr of 1.57 on 9/24/15. Scr progressively increased over the years and was 2.17 on 9/16/19. Scr was 2.45 on admission on 10/29/21. Scr elevated/stable at 3.01 today. Pt. is planned for OR for Left temporal resection for 11/3/21.   Pt. has advanced CKD stage 4. Pt. has acceptable risk for surgery. Discussed with the patient at length. Currently on D5w with 0.45 NS. Recommend changing IVF to D5w NS@ 100cc/hr. Monitor labs and urine output. Avoid any potential nephrotoxins. Discontinue ACE-I. Dose meds as per eGFR.

## 2021-11-03 ENCOUNTER — RESULT REVIEW (OUTPATIENT)
Age: 71
End: 2021-11-03

## 2021-11-03 ENCOUNTER — APPOINTMENT (OUTPATIENT)
Dept: OTOLARYNGOLOGY | Facility: HOSPITAL | Age: 71
End: 2021-11-03

## 2021-11-03 ENCOUNTER — NON-APPOINTMENT (OUTPATIENT)
Age: 71
End: 2021-11-03

## 2021-11-03 LAB
ANION GAP SERPL CALC-SCNC: 10 MMOL/L — SIGNIFICANT CHANGE UP (ref 7–14)
APTT BLD: 36 SEC — SIGNIFICANT CHANGE UP (ref 27–36.3)
BLD GP AB SCN SERPL QL: NEGATIVE — SIGNIFICANT CHANGE UP
BLOOD GAS ARTERIAL - LYTES,HGB,ICA,LACT RESULT: SIGNIFICANT CHANGE UP
BUN SERPL-MCNC: 75 MG/DL — HIGH (ref 7–23)
CALCIUM SERPL-MCNC: 9.3 MG/DL — SIGNIFICANT CHANGE UP (ref 8.4–10.5)
CHLORIDE SERPL-SCNC: 103 MMOL/L — SIGNIFICANT CHANGE UP (ref 98–107)
CO2 SERPL-SCNC: 22 MMOL/L — SIGNIFICANT CHANGE UP (ref 22–31)
CREAT SERPL-MCNC: 2.69 MG/DL — HIGH (ref 0.5–1.3)
GAS PNL BLDA: SIGNIFICANT CHANGE UP
GLUCOSE BLDC GLUCOMTR-MCNC: 275 MG/DL — HIGH (ref 70–99)
GLUCOSE BLDC GLUCOMTR-MCNC: 288 MG/DL — HIGH (ref 70–99)
GLUCOSE SERPL-MCNC: 280 MG/DL — HIGH (ref 70–99)
HCT VFR BLD CALC: 28.9 % — LOW (ref 34.5–45)
HGB BLD-MCNC: 8.8 G/DL — LOW (ref 11.5–15.5)
INR BLD: 0.99 RATIO — SIGNIFICANT CHANGE UP (ref 0.88–1.16)
MAGNESIUM SERPL-MCNC: 2.1 MG/DL — SIGNIFICANT CHANGE UP (ref 1.6–2.6)
MCHC RBC-ENTMCNC: 27.5 PG — SIGNIFICANT CHANGE UP (ref 27–34)
MCHC RBC-ENTMCNC: 30.4 GM/DL — LOW (ref 32–36)
MCV RBC AUTO: 90.3 FL — SIGNIFICANT CHANGE UP (ref 80–100)
NRBC # BLD: 0 /100 WBCS — SIGNIFICANT CHANGE UP
NRBC # FLD: 0 K/UL — SIGNIFICANT CHANGE UP
PHOSPHATE SERPL-MCNC: 3.5 MG/DL — SIGNIFICANT CHANGE UP (ref 2.5–4.5)
PLATELET # BLD AUTO: 267 K/UL — SIGNIFICANT CHANGE UP (ref 150–400)
POTASSIUM SERPL-MCNC: 5.3 MMOL/L — SIGNIFICANT CHANGE UP (ref 3.5–5.3)
POTASSIUM SERPL-SCNC: 5.3 MMOL/L — SIGNIFICANT CHANGE UP (ref 3.5–5.3)
PROTHROM AB SERPL-ACNC: 11.4 SEC — SIGNIFICANT CHANGE UP (ref 10.6–13.6)
RBC # BLD: 3.2 M/UL — LOW (ref 3.8–5.2)
RBC # FLD: 14.3 % — SIGNIFICANT CHANGE UP (ref 10.3–14.5)
RH IG SCN BLD-IMP: POSITIVE — SIGNIFICANT CHANGE UP
SODIUM SERPL-SCNC: 135 MMOL/L — SIGNIFICANT CHANGE UP (ref 135–145)
WBC # BLD: 9.44 K/UL — SIGNIFICANT CHANGE UP (ref 3.8–10.5)
WBC # FLD AUTO: 9.44 K/UL — SIGNIFICANT CHANGE UP (ref 3.8–10.5)

## 2021-11-03 PROCEDURE — 99232 SBSQ HOSP IP/OBS MODERATE 35: CPT | Mod: GC

## 2021-11-03 PROCEDURE — 88307 TISSUE EXAM BY PATHOLOGIST: CPT | Mod: 26

## 2021-11-03 PROCEDURE — 14301 TIS TRNFR ANY 30.1-60 SQ CM: CPT | Mod: GC

## 2021-11-03 PROCEDURE — 92516 FACIAL NERVE FUNCTION TEST: CPT

## 2021-11-03 PROCEDURE — 38724 REMOVAL OF LYMPH NODES NECK: CPT | Mod: GC,LT

## 2021-11-03 PROCEDURE — 42415 EXCISE PAROTID GLAND/LESION: CPT | Mod: GC,LT

## 2021-11-03 PROCEDURE — 69120 REMOVAL OF EXTERNAL EAR: CPT | Mod: GC

## 2021-11-03 PROCEDURE — 69535 REMOVE PART OF TEMPORAL BONE: CPT | Mod: LT

## 2021-11-03 PROCEDURE — 88311 DECALCIFY TISSUE: CPT | Mod: 26

## 2021-11-03 PROCEDURE — 69720 RELEASE FACIAL NERVE: CPT | Mod: LT

## 2021-11-03 PROCEDURE — 15733 MUSC MYOQ/FSCQ FLP H&N PEDCL: CPT | Mod: GC,59

## 2021-11-03 RX ORDER — FENTANYL CITRATE 50 UG/ML
25 INJECTION INTRAVENOUS
Refills: 0 | Status: DISCONTINUED | OUTPATIENT
Start: 2021-11-03 | End: 2021-11-04

## 2021-11-03 RX ORDER — SODIUM CHLORIDE 9 MG/ML
1000 INJECTION, SOLUTION INTRAVENOUS
Refills: 0 | Status: DISCONTINUED | OUTPATIENT
Start: 2021-11-03 | End: 2021-11-04

## 2021-11-03 RX ORDER — HYDROMORPHONE HYDROCHLORIDE 2 MG/ML
0.5 INJECTION INTRAMUSCULAR; INTRAVENOUS; SUBCUTANEOUS
Refills: 0 | Status: DISCONTINUED | OUTPATIENT
Start: 2021-11-03 | End: 2021-11-04

## 2021-11-03 RX ADMIN — Medication 325 MILLIGRAM(S): at 05:37

## 2021-11-03 RX ADMIN — Medication 3: at 06:53

## 2021-11-03 RX ADMIN — SODIUM CHLORIDE 100 MILLILITER(S): 9 INJECTION, SOLUTION INTRAVENOUS at 00:00

## 2021-11-03 RX ADMIN — Medication 75 MICROGRAM(S): at 05:37

## 2021-11-03 RX ADMIN — Medication 650 MILLIGRAM(S): at 05:36

## 2021-11-03 RX ADMIN — Medication 50 MILLIGRAM(S): at 05:37

## 2021-11-03 RX ADMIN — HEPARIN SODIUM 5000 UNIT(S): 5000 INJECTION INTRAVENOUS; SUBCUTANEOUS at 05:37

## 2021-11-03 RX ADMIN — Medication 650 MILLIGRAM(S): at 06:06

## 2021-11-03 RX ADMIN — CEFEPIME 100 MILLIGRAM(S): 1 INJECTION, POWDER, FOR SOLUTION INTRAMUSCULAR; INTRAVENOUS at 05:36

## 2021-11-03 NOTE — PROGRESS NOTE ADULT - SUBJECTIVE AND OBJECTIVE BOX
HPI: 72yo F w/ pmh inclusive of htn, hcl, DM, CKD, breast ca, being treated outpt by ENT Dr. Gil for left ear malignancy, transferred from [facility] for ENT consult of Left ear infection. Pt was admitted at [facility] on 10/24 after experiencing syncopal episode and after extensive workup there, is now transferred here for ENT mngmt of Left ear malignancy. Plan for OR on 11/3 for L temporal bone resection with Dr. Park and Dr. Gil.    Interval:  nose path still pending  no acute events overnight.   NPO for procedure today    Vital Signs Last 24 Hrs  T(C): 36.9 (03 Nov 2021 05:26), Max: 36.9 (03 Nov 2021 05:26)  T(F): 98.4 (03 Nov 2021 05:26), Max: 98.4 (03 Nov 2021 05:26)  HR: 71 (03 Nov 2021 05:26) (67 - 74)  BP: 148/59 (03 Nov 2021 05:26) (140/65 - 152/68)  BP(mean): --  RR: 16 (03 Nov 2021 05:26) (16 - 18)  SpO2: 97% (03 Nov 2021 05:26) (95% - 99%)  Physical Exam:   General: NAD, A+Ox3  Respiratory: No respiratory distress, stridor, or stertor  Voice quality: normal  OU:  EOMI  AD: Pinna wnl, EAC clear, TM intact, no effusion  AS: fungating left ear mass w/ necrosed tissue in pinna, and foul smelling purulent drainage  Nose: ulcerating lesion on left skin of nare; s/p biopsy, small clot/scabbing of lesion  OC/OP: tongue normal, floor of mouth WNL, no masses or lesions, OP clear  Neck: soft/flat, no LAD    A/P: 71yFemale w/ pmh inclusive of htn, hcl, DM, CKD, breast ca, and fungating ear mass admitted for preop workup and clearance prior to left temporal bone resection on 11/3 with Dr. Park and Dr. Gil  - OR for L LTBR today  - f/u surgical path  - f/u ID cultures  - appreciate renal recs for increased Cr, renal cleared for OR today  - appreciate medicine hospitalist input/recs: continue q12h BMP  - appreciate ID recs: one time vanc dosing, cefepime 2g Q12h  - repletions Mg >2, Phos >3, K >4  - sqh, SCDs

## 2021-11-03 NOTE — PROGRESS NOTE ADULT - SUBJECTIVE AND OBJECTIVE BOX
[facility] DIVISION OF KIDNEY DISEASES AND HYPERTENSION -- FOLLOW UP NOTE  --------------------------------------------------------------------------------  HPI: 71-year-old female with longstanding history of DM (>40 years), HTN, breast cancer, left ear malignancy and CKD was initially admitted to [facility] on 10/24/21 for L ear infection, then transferred to [facility] on 10/29/21 for further ENT management. Planned for lateral temporal bone resection by ENT team. Nephrology consulted for CKD management. [facility] patient noted to have elevated Scr of 1.57 on 9/24/15. Scr progressively increased over the years and was 2.17 on 9/16/19. Scr was 2.63 on 10/29/21 and it increased to 3.01 on 11/2/21. Scr is at 2.69 today. Pt. has been aware of her kidney disease and saw Dr. Dk Rose few months ago as outpatient in [place], [place].     Pt. seen and examined today.  Pt. feels okay but gives history of left facial/ear pain. Denies SOB, CP, HA, or dizziness.    PAST HISTORY  --------------------------------------------------------------------------------  No significant changes to PMH, PSH, FHx, SHx, unless otherwise noted    ALLERGIES & MEDICATIONS  --------------------------------------------------------------------------------  Allergies    No Known Allergies    Intolerances    Standing Inpatient Medications  atorvastatin 40 milliGRAM(s) Oral at bedtime  cefepime   IVPB 1000 milliGRAM(s) IV Intermittent every 12 hours  dextrose 40% Gel 15 Gram(s) Oral once  dextrose 5% + sodium chloride 0.9%. 1000 milliLiter(s) IV Continuous <Continuous>  dextrose 5%. 1000 milliLiter(s) IV Continuous <Continuous>  dextrose 5%. 1000 milliLiter(s) IV Continuous <Continuous>  dextrose 50% Injectable 25 Gram(s) IV Push once  dextrose 50% Injectable 12.5 Gram(s) IV Push once  dextrose 50% Injectable 25 Gram(s) IV Push once  glucagon  Injectable 1 milliGRAM(s) IntraMuscular once  heparin   Injectable 5000 Unit(s) SubCutaneous every 12 hours  hydrALAZINE 50 milliGRAM(s) Oral three times a day  influenza  Vaccine (HIGH DOSE) 0.7 milliLiter(s) IntraMuscular once  insulin glargine Injectable (LANTUS) 20 Unit(s) SubCutaneous at bedtime  insulin lispro (ADMELOG) corrective regimen sliding scale   SubCutaneous three times a day before meals  insulin lispro (ADMELOG) corrective regimen sliding scale   SubCutaneous at bedtime  insulin lispro Injectable (ADMELOG) 10 Unit(s) SubCutaneous three times a day before meals  levothyroxine 75 MICROGram(s) Oral daily  polyethylene glycol 3350 17 Gram(s) Oral daily  senna 2 Tablet(s) Oral at bedtime  sodium bicarbonate 325 milliGRAM(s) Oral two times a day    PRN Inpatient Medications  acetaminophen     Tablet .. 650 milliGRAM(s) Oral every 6 hours PRN  oxyCODONE    IR 5 milliGRAM(s) Oral every 6 hours PRN  oxyCODONE    IR 2.5 milliGRAM(s) Oral every 6 hours PRN    REVIEW OF SYSTEMS  --------------------------------------------------------------------------------  Gen: No fevers , L ear pain +  Respiratory: No dyspnea  CV: No chest pain  GI: No abdominal pain  : No dysuria  MSK: No  edema  Neuro: no dizziness     VITALS/PHYSICAL EXAM  --------------------------------------------------------------------------------  T(C): 36.9 (11-03-21 @ 05:26), Max: 36.9 (11-03-21 @ 05:26)  HR: 71 (11-03-21 @ 05:26) (67 - 74)  BP: 148/59 (11-03-21 @ 05:26) (140/65 - 152/68)  RR: 16 (11-03-21 @ 05:26) (16 - 18)  SpO2: 97% (11-03-21 @ 05:26) (95% - 99%)  Wt(kg): --  Height (cm): 154.9 (11-03-21 @ 01:12)  Weight (kg): 107.9 (11-03-21 @ 01:12)  BMI (kg/m2): 45 (11-03-21 @ 01:12)  BSA (m2): 2.03 (11-03-21 @ 01:12)    11-02-21 @ 07:01  -  11-03-21 @ 07:00  --------------------------------------------------------  IN: 1150 mL / OUT: 2550 mL / NET: -1400 mL    Physical Exam:  	Gen: resting, NAD  	HEENT: L ear with large hole and necrotic/infected lesion seen  	Pulm: CTA B/L, no crackles   	CV: S1S2+  	Abd: Soft, +BS   	Ext: No LE edema B/L  	Neuro: Awake, alert  	Skin: Warm and dry    LABS/STUDIES  --------------------------------------------------------------------------------              8.8    9.44  >-----------<  267      [11-03-21 @ 07:10]              28.9     135  |  103  |  75  ----------------------------<  280      [11-03-21 @ 07:10]  5.3   |  22  |  2.69        Ca     9.3     [11-03-21 @ 07:10]      Mg     2.10     [11-03-21 @ 07:10]      Phos  3.5     [11-03-21 @ 07:10]      PT/INR: PT 11.4 , INR 0.99       [11-03-21 @ 07:10]  PTT: 36.0       [11-03-21 @ 07:10]    Creatinine Trend:  SCr 2.69 [11-03 @ 07:10]  SCr 3.01 [11-02 @ 07:41]  SCr 2.84 [11-01 @ 07:14]  SCr 2.63 [10-31 @ 19:46]  SCr 2.56 [10-31 @ 06:57]

## 2021-11-03 NOTE — BRIEF OPERATIVE NOTE - NSICDXBRIEFPROCEDURE_GEN_ALL_CORE_FT
PROCEDURES:  Left auriculectomy 03-Nov-2021 23:54:05  Valeria Sepulveda  Lateral temporal bone resection 03-Nov-2021 23:54:14  Valeria Sepulveda  Left selective neck dissection 03-Nov-2021 23:54:24  Valeria Sepulveda  Parotidectomy 03-Nov-2021 23:54:30  Valeria Sepulveda

## 2021-11-03 NOTE — BRIEF OPERATIVE NOTE - OPERATION/FINDINGS
L auriculectomy, left lateral temporal bone resection, with parotidectomy, and L SND 2-5 removed en bloc. reconstructed with L supraclavicular flap.

## 2021-11-03 NOTE — PROGRESS NOTE ADULT - PROBLEM SELECTOR PLAN 2
Pt. with metabolic acidosis in setting of CKD. Started on oral sodium bicarbonate 325 mg PO BID. serum sodium bicarb is at 22 today. Monitor serum CO2.    If you have any questions, please feel free to contact me  Kimani Neff  Nephrology Fellow  [phone]  (After 5pm or on weekends please page the on-call fellow)

## 2021-11-03 NOTE — PROGRESS NOTE ADULT - PROBLEM SELECTOR PLAN 1
Pt. with advanced CKD stage 4 in setting of longstanding DM and HTN. On review of labs on [facility]/[place], patient noted to have elevated Scr of 1.57 on 9/24/15. Scr progressively increased over the years and was 2.17 on 9/16/19. on admission Scr was 2.45 on 10/29/21. Scr elevated/stable at 3.01 on 11/2/21. Scr is at 2.69 today. Pt. is planned for OR for Left temporal resection today. Pt. has advanced CKD stage 4. Pt. has acceptable risk for surgery. Discussed with the patient at length. Currently D5W+ NS@ 100cc/hr. Recommend continuing IVF for now. Monitor labs and urine output. Avoid any potential nephrotoxins. Discontinue ACE-I. Dose meds as per eGFR.

## 2021-11-04 LAB
ANION GAP SERPL CALC-SCNC: 13 MMOL/L — SIGNIFICANT CHANGE UP (ref 7–14)
ANION GAP SERPL CALC-SCNC: 14 MMOL/L — SIGNIFICANT CHANGE UP (ref 7–14)
BLOOD GAS ARTERIAL - LYTES,HGB,ICA,LACT RESULT: SIGNIFICANT CHANGE UP
BUN SERPL-MCNC: 63 MG/DL — HIGH (ref 7–23)
BUN SERPL-MCNC: 66 MG/DL — HIGH (ref 7–23)
CALCIUM SERPL-MCNC: 8.7 MG/DL — SIGNIFICANT CHANGE UP (ref 8.4–10.5)
CALCIUM SERPL-MCNC: 9 MG/DL — SIGNIFICANT CHANGE UP (ref 8.4–10.5)
CHLORIDE SERPL-SCNC: 108 MMOL/L — HIGH (ref 98–107)
CHLORIDE SERPL-SCNC: 110 MMOL/L — HIGH (ref 98–107)
CO2 SERPL-SCNC: 18 MMOL/L — LOW (ref 22–31)
CO2 SERPL-SCNC: 18 MMOL/L — LOW (ref 22–31)
CREAT SERPL-MCNC: 2.39 MG/DL — HIGH (ref 0.5–1.3)
CREAT SERPL-MCNC: 2.4 MG/DL — HIGH (ref 0.5–1.3)
GLUCOSE BLDC GLUCOMTR-MCNC: 145 MG/DL — HIGH (ref 70–99)
GLUCOSE BLDC GLUCOMTR-MCNC: 173 MG/DL — HIGH (ref 70–99)
GLUCOSE BLDC GLUCOMTR-MCNC: 187 MG/DL — HIGH (ref 70–99)
GLUCOSE BLDC GLUCOMTR-MCNC: 194 MG/DL — HIGH (ref 70–99)
GLUCOSE BLDC GLUCOMTR-MCNC: 227 MG/DL — HIGH (ref 70–99)
GLUCOSE SERPL-MCNC: 148 MG/DL — HIGH (ref 70–99)
GLUCOSE SERPL-MCNC: 180 MG/DL — HIGH (ref 70–99)
HCT VFR BLD CALC: 26 % — LOW (ref 34.5–45)
HGB BLD-MCNC: 8.3 G/DL — LOW (ref 11.5–15.5)
MAGNESIUM SERPL-MCNC: 1.8 MG/DL — SIGNIFICANT CHANGE UP (ref 1.6–2.6)
MAGNESIUM SERPL-MCNC: 1.9 MG/DL — SIGNIFICANT CHANGE UP (ref 1.6–2.6)
MCHC RBC-ENTMCNC: 28.6 PG — SIGNIFICANT CHANGE UP (ref 27–34)
MCHC RBC-ENTMCNC: 31.9 GM/DL — LOW (ref 32–36)
MCV RBC AUTO: 89.7 FL — SIGNIFICANT CHANGE UP (ref 80–100)
NRBC # BLD: 0 /100 WBCS — SIGNIFICANT CHANGE UP
NRBC # FLD: 0 K/UL — SIGNIFICANT CHANGE UP
PHOSPHATE SERPL-MCNC: 3.6 MG/DL — SIGNIFICANT CHANGE UP (ref 2.5–4.5)
PHOSPHATE SERPL-MCNC: 4.1 MG/DL — SIGNIFICANT CHANGE UP (ref 2.5–4.5)
PLATELET # BLD AUTO: 212 K/UL — SIGNIFICANT CHANGE UP (ref 150–400)
POTASSIUM SERPL-MCNC: 5.2 MMOL/L — SIGNIFICANT CHANGE UP (ref 3.5–5.3)
POTASSIUM SERPL-MCNC: 5.6 MMOL/L — HIGH (ref 3.5–5.3)
POTASSIUM SERPL-SCNC: 5.2 MMOL/L — SIGNIFICANT CHANGE UP (ref 3.5–5.3)
POTASSIUM SERPL-SCNC: 5.6 MMOL/L — HIGH (ref 3.5–5.3)
RBC # BLD: 2.9 M/UL — LOW (ref 3.8–5.2)
RBC # FLD: 14.6 % — HIGH (ref 10.3–14.5)
SODIUM SERPL-SCNC: 140 MMOL/L — SIGNIFICANT CHANGE UP (ref 135–145)
SODIUM SERPL-SCNC: 141 MMOL/L — SIGNIFICANT CHANGE UP (ref 135–145)
WBC # BLD: 13.28 K/UL — HIGH (ref 3.8–10.5)
WBC # FLD AUTO: 13.28 K/UL — HIGH (ref 3.8–10.5)

## 2021-11-04 PROCEDURE — 99233 SBSQ HOSP IP/OBS HIGH 50: CPT | Mod: GC

## 2021-11-04 PROCEDURE — 71045 X-RAY EXAM CHEST 1 VIEW: CPT | Mod: 26

## 2021-11-04 PROCEDURE — 99222 1ST HOSP IP/OBS MODERATE 55: CPT

## 2021-11-04 RX ORDER — HYDRALAZINE HCL 50 MG
10 TABLET ORAL ONCE
Refills: 0 | Status: COMPLETED | OUTPATIENT
Start: 2021-11-04 | End: 2021-11-04

## 2021-11-04 RX ORDER — SODIUM CHLORIDE 9 MG/ML
1000 INJECTION, SOLUTION INTRAVENOUS
Refills: 0 | Status: DISCONTINUED | OUTPATIENT
Start: 2021-11-04 | End: 2021-11-05

## 2021-11-04 RX ORDER — CALCIUM GLUCONATE 100 MG/ML
1 VIAL (ML) INTRAVENOUS ONCE
Refills: 0 | Status: COMPLETED | OUTPATIENT
Start: 2021-11-04 | End: 2021-11-04

## 2021-11-04 RX ORDER — BACITRACIN ZINC 500 UNIT/G
1 OINTMENT IN PACKET (EA) TOPICAL
Refills: 0 | Status: DISCONTINUED | OUTPATIENT
Start: 2021-11-04 | End: 2021-11-11

## 2021-11-04 RX ORDER — LEVOTHYROXINE SODIUM 125 MCG
60 TABLET ORAL AT BEDTIME
Refills: 0 | Status: DISCONTINUED | OUTPATIENT
Start: 2021-11-04 | End: 2021-11-06

## 2021-11-04 RX ORDER — HYDRALAZINE HCL 50 MG
10 TABLET ORAL EVERY 4 HOURS
Refills: 0 | Status: DISCONTINUED | OUTPATIENT
Start: 2021-11-04 | End: 2021-11-04

## 2021-11-04 RX ORDER — PROPOFOL 10 MG/ML
25 INJECTION, EMULSION INTRAVENOUS
Qty: 500 | Refills: 0 | Status: DISCONTINUED | OUTPATIENT
Start: 2021-11-04 | End: 2021-11-04

## 2021-11-04 RX ORDER — LEVOTHYROXINE SODIUM 125 MCG
37.5 TABLET ORAL AT BEDTIME
Refills: 0 | Status: DISCONTINUED | OUTPATIENT
Start: 2021-11-04 | End: 2021-11-04

## 2021-11-04 RX ORDER — INSULIN LISPRO 100/ML
VIAL (ML) SUBCUTANEOUS EVERY 6 HOURS
Refills: 0 | Status: DISCONTINUED | OUTPATIENT
Start: 2021-11-04 | End: 2021-11-06

## 2021-11-04 RX ORDER — INSULIN HUMAN 100 [IU]/ML
10 INJECTION, SOLUTION SUBCUTANEOUS ONCE
Refills: 0 | Status: COMPLETED | OUTPATIENT
Start: 2021-11-04 | End: 2021-11-04

## 2021-11-04 RX ORDER — INSULIN LISPRO 100/ML
10 VIAL (ML) SUBCUTANEOUS
Refills: 0 | Status: DISCONTINUED | OUTPATIENT
Start: 2021-11-04 | End: 2021-11-04

## 2021-11-04 RX ORDER — CHLORHEXIDINE GLUCONATE 213 G/1000ML
15 SOLUTION TOPICAL EVERY 12 HOURS
Refills: 0 | Status: DISCONTINUED | OUTPATIENT
Start: 2021-11-04 | End: 2021-11-04

## 2021-11-04 RX ORDER — ACETAMINOPHEN 500 MG
1000 TABLET ORAL ONCE
Refills: 0 | Status: COMPLETED | OUTPATIENT
Start: 2021-11-04 | End: 2021-11-04

## 2021-11-04 RX ORDER — DEXTROSE 50 % IN WATER 50 %
50 SYRINGE (ML) INTRAVENOUS ONCE
Refills: 0 | Status: COMPLETED | OUTPATIENT
Start: 2021-11-04 | End: 2021-11-04

## 2021-11-04 RX ORDER — HYDRALAZINE HCL 50 MG
10 TABLET ORAL EVERY 4 HOURS
Refills: 0 | Status: DISCONTINUED | OUTPATIENT
Start: 2021-11-04 | End: 2021-11-05

## 2021-11-04 RX ORDER — ACETAMINOPHEN 500 MG
1000 TABLET ORAL EVERY 6 HOURS
Refills: 0 | Status: COMPLETED | OUTPATIENT
Start: 2021-11-04 | End: 2021-11-06

## 2021-11-04 RX ADMIN — Medication 50 MILLILITER(S): at 06:55

## 2021-11-04 RX ADMIN — Medication 60 MICROGRAM(S): at 22:22

## 2021-11-04 RX ADMIN — Medication 100 GRAM(S): at 06:54

## 2021-11-04 RX ADMIN — PROPOFOL 16.2 MICROGRAM(S)/KG/MIN: 10 INJECTION, EMULSION INTRAVENOUS at 01:37

## 2021-11-04 RX ADMIN — Medication 1 APPLICATION(S): at 17:41

## 2021-11-04 RX ADMIN — CEFEPIME 100 MILLIGRAM(S): 1 INJECTION, POWDER, FOR SOLUTION INTRAMUSCULAR; INTRAVENOUS at 17:33

## 2021-11-04 RX ADMIN — HYDROMORPHONE HYDROCHLORIDE 0.5 MILLIGRAM(S): 2 INJECTION INTRAMUSCULAR; INTRAVENOUS; SUBCUTANEOUS at 08:18

## 2021-11-04 RX ADMIN — Medication 1: at 11:59

## 2021-11-04 RX ADMIN — SODIUM CHLORIDE 75 MILLILITER(S): 9 INJECTION, SOLUTION INTRAVENOUS at 01:30

## 2021-11-04 RX ADMIN — SODIUM CHLORIDE 100 MILLILITER(S): 9 INJECTION, SOLUTION INTRAVENOUS at 11:08

## 2021-11-04 RX ADMIN — CHLORHEXIDINE GLUCONATE 15 MILLILITER(S): 213 SOLUTION TOPICAL at 05:23

## 2021-11-04 RX ADMIN — Medication 2: at 17:35

## 2021-11-04 RX ADMIN — INSULIN GLARGINE 20 UNIT(S): 100 INJECTION, SOLUTION SUBCUTANEOUS at 22:22

## 2021-11-04 RX ADMIN — Medication 10 MILLIGRAM(S): at 11:00

## 2021-11-04 RX ADMIN — Medication 400 MILLIGRAM(S): at 16:00

## 2021-11-04 RX ADMIN — HEPARIN SODIUM 5000 UNIT(S): 5000 INJECTION INTRAVENOUS; SUBCUTANEOUS at 17:32

## 2021-11-04 RX ADMIN — HEPARIN SODIUM 5000 UNIT(S): 5000 INJECTION INTRAVENOUS; SUBCUTANEOUS at 05:23

## 2021-11-04 RX ADMIN — CEFEPIME 100 MILLIGRAM(S): 1 INJECTION, POWDER, FOR SOLUTION INTRAMUSCULAR; INTRAVENOUS at 07:18

## 2021-11-04 RX ADMIN — Medication 1 APPLICATION(S): at 05:32

## 2021-11-04 RX ADMIN — Medication 400 MILLIGRAM(S): at 21:36

## 2021-11-04 RX ADMIN — Medication 10 MILLIGRAM(S): at 05:55

## 2021-11-04 RX ADMIN — Medication 10 MILLIGRAM(S): at 17:33

## 2021-11-04 RX ADMIN — Medication 1000 MILLIGRAM(S): at 16:30

## 2021-11-04 RX ADMIN — Medication 10 MILLIGRAM(S): at 11:59

## 2021-11-04 RX ADMIN — INSULIN HUMAN 10 UNIT(S): 100 INJECTION, SOLUTION SUBCUTANEOUS at 06:52

## 2021-11-04 NOTE — CHART NOTE - NSCHARTNOTEFT_GEN_A_CORE
At 1530 notified by nursing staff patient self-extubated. Upon evaluation patient with ETT/OGT dislodged onto bed, saturating 97% on RA in NAD. Patient somnolent, but opens eyes, following simple commands, speaking softly, but clearly. Respirations unlabored. CTAB. Applied face tent for humidified oxygen supplementation given self-extubation. Patient able to clear secretions. Patient with Mallampati score IV. Airway box bedside. Should respiratory distress develop or desaturation occur, low threshold to escalate to BiPAP vs. reintubation. At this time, however, patient recovering well from self-extubation and will not require reintubation. Will continue to monitor.,    Krystle MADISON  SICU [id number]

## 2021-11-04 NOTE — DIETITIAN INITIAL EVALUATION ADULT. - PERTINENT LABORATORY DATA
11-04 Na 141 mmol/L Glu 180 mg/dL<H> K+ 5.2 mmol/L Cr 2.40 mg/dL<H> BUN 63 mg/dL<H> Phos 3.6 mg/dL  11-04 @ 11:09 POCT 194 mg/dL  11-04 @ 05:30 POCT 145 mg/dL  11-04 @ 01:32 POCT 173 mg/dL

## 2021-11-04 NOTE — PROGRESS NOTE ADULT - PROBLEM SELECTOR PLAN 2
Pt. with metabolic acidosis in setting of CKD. Started on oral sodium bicarbonate 325 mg PO BID. serum sodium bicarb is at 18 today. Monitor serum CO2.    If you have any questions, please feel free to contact me  Kimani Neff  Nephrology Fellow  [phone]  (After 5pm or on weekends please page the on-call fellow)

## 2021-11-04 NOTE — CONSULT NOTE ADULT - SUBJECTIVE AND OBJECTIVE BOX
SICU Consultation Note  =====================================================  HPI:   70yo F w/ pmh inclusive of htn, hcl, DM, CKD, breast ca, being treated outpt by ENT Dr. Gil for left ear malignancy, transferred from [facility] for ENT consult of Left ear infection. Pt was admitted at [facility] on 10/24 after experiencing syncopal episode and after extensive workup there, is now transferred here for ENT mngmt of Left ear malignancy as pt's ENT is out of [facility]. Pt reporting increased "burning" pain of left ear w/ increased foul smelling drainage from left ear lesion. No recent fever/chills, No photophobia/eye pain/changes in vision, no sore throat/dysphagia, No chest pain/palpitations, no SOB/cough/wheeze/stridor, No abdominal pain, No N/V/D, no dysuria/frequency/discharge, No neck/back pain, no rash, no new focal neuro symptoms.     POST OP:  L auriculectomy, left lateral temporal bone resection, with parotidectomy, and L SND 2-5 removed en bloc. reconstructed with L supraclavicular flap.  - Intubated  -     Surgery Information  OR time:      EBL:          IV Fluids:       Blood Products:   UOP:          PAST MEDICAL & SURGICAL HISTORY:  Cancer of ear      Home Meds: Home Medications:    Allergies: Allergies    No Known Allergies    Intolerances      Soc:   Advanced Directives: Presumed Full Code     ROS:    REVIEW OF SYSTEMS    [ ] A ten-point review of systems was otherwise negative except as noted.  [ ] Due to altered mental status/intubation, subjective information were not able to be obtained from the patient. History was obtained, to the extent possible, from review of the chart and collateral sources of information.      CURRENT MEDICATIONS:   --------------------------------------------------------------------------------------  Neurologic Medications  acetaminophen     Tablet .. 650 milliGRAM(s) Oral every 6 hours PRN Mild Pain (1 - 3)  fentaNYL    Injectable 25 MICROGram(s) IV Push every 5 minutes PRN Moderate Pain (4 - 6)  HYDROmorphone  Injectable 0.5 milliGRAM(s) IV Push every 10 minutes PRN Severe Pain (7 - 10)  oxyCODONE    IR 5 milliGRAM(s) Oral every 6 hours PRN Severe Pain (7 - 10)  oxyCODONE    IR 2.5 milliGRAM(s) Oral every 6 hours PRN Moderate Pain (4 - 6)  propofol Infusion 25 MICROgram(s)/kG/Min IV Continuous <Continuous>    Respiratory Medications    Cardiovascular Medications  hydrALAZINE 50 milliGRAM(s) Oral three times a day  hydrALAZINE Injectable 10 milliGRAM(s) IV Push every 4 hours PRN SBP>175    Gastrointestinal Medications  calcium gluconate IVPB 1 Gram(s) IV Intermittent once  dextrose 5%. 1000 milliLiter(s) IV Continuous <Continuous>  dextrose 5%. 1000 milliLiter(s) IV Continuous <Continuous>  lactated ringers. 1000 milliLiter(s) IV Continuous <Continuous>  lactated ringers. 1000 milliLiter(s) IV Continuous <Continuous>  polyethylene glycol 3350 17 Gram(s) Oral daily  senna 2 Tablet(s) Oral at bedtime  sodium bicarbonate 325 milliGRAM(s) Oral two times a day    Genitourinary Medications    Hematologic/Oncologic Medications  heparin   Injectable 5000 Unit(s) SubCutaneous every 12 hours  influenza  Vaccine (HIGH DOSE) 0.7 milliLiter(s) IntraMuscular once    Antimicrobial/Immunologic Medications  cefepime   IVPB 1000 milliGRAM(s) IV Intermittent every 12 hours    Endocrine/Metabolic Medications  atorvastatin 40 milliGRAM(s) Oral at bedtime  dextrose 40% Gel 15 Gram(s) Oral once  dextrose 50% Injectable 25 Gram(s) IV Push once  dextrose 50% Injectable 12.5 Gram(s) IV Push once  dextrose 50% Injectable 25 Gram(s) IV Push once  dextrose 50% Injectable 50 milliLiter(s) IV Push once  glucagon  Injectable 1 milliGRAM(s) IntraMuscular once  insulin glargine Injectable (LANTUS) 20 Unit(s) SubCutaneous at bedtime  insulin lispro (ADMELOG) corrective regimen sliding scale   SubCutaneous every 6 hours  insulin lispro Injectable (ADMELOG) 10 Unit(s) SubCutaneous three times a day before meals  insulin regular  human recombinant 10 Unit(s) IV Push once  levothyroxine Injectable 60 MICROGram(s) IV Push at bedtime    Topical/Other Medications  BACItracin   Ointment 1 Application(s) Topical two times a day  chlorhexidine 0.12% Liquid 15 milliLiter(s) Oral Mucosa every 12 hours    --------------------------------------------------------------------------------------    VITAL SIGNS, INS/OUTS (last 24 hours):  --------------------------------------------------------------------------------------  ICU Vital Signs Last 24 Hrs  T(C): 36.6 (04 Nov 2021 04:00), Max: 36.8 (03 Nov 2021 09:36)  T(F): 97.8 (04 Nov 2021 04:00), Max: 98.3 (03 Nov 2021 09:36)  HR: 59 (04 Nov 2021 06:00) (57 - 66)  BP: 143/55 (04 Nov 2021 06:00) (114/49 - 166/64)  BP(mean): 78 (04 Nov 2021 06:00) (65 - 93)  ABP: 141/50 (04 Nov 2021 06:00) (109/40 - 168/60)  ABP(mean): 86 (04 Nov 2021 06:00) (63 - 102)  RR: 13 (04 Nov 2021 06:00) (10 - 18)  SpO2: 100% (04 Nov 2021 06:00) (97% - 100%)    I&O's Summary    02 Nov 2021 07:01  -  03 Nov 2021 07:00  --------------------------------------------------------  IN: 1150 mL / OUT: 2550 mL / NET: -1400 mL    03 Nov 2021 07:01  -  04 Nov 2021 06:50  --------------------------------------------------------  IN: 390 mL / OUT: 402 mL / NET: -12 mL      --------------------------------------------------------------------------------------    EXAM:  General/Neuro  RASS:   GCS:   Exam: Normal, NAD, alert, oriented x 3, no focal deficits. PERRLA  ***    Respiratory  Exam: Lungs clear to auscultation, Normal expansion/effort.  ***  [] Tracheostomy   [] Intubated  Mechanical Ventilation:     Cardiovascular  Exam: S1, S2.  Regular rate and rhythm.  Peripheral edema  ***  Cardiac Rhythm: Normal Sinus Rhythm  ECHO:     GI  Exam: Abdomen soft, Non-tender, Non-distended.  Gastrostomy / Jejunostomy tube in place.  Nasogastric tube in place.  Colostomy / Ileostomy.  ***  Wound:   ***  Current Diet:  NPO***      Tubes/Lines/Drains  ***  [x] Peripheral IV  [] Central Venous Line     	[] R	[] L	[] IJ	[] Fem	[] SC        Type:	    Date Placed:   [] Arterial Line		[] R	[] L	[] Fem	[] Rad	[] Ax	Date Placed:   [] PICC:         	[] Midline		[] Mediport           [] Urinary Catheter		Date Placed:     Extremities  Exam: Extremities warm, pink, well-perfused.        Derm:  Exam: Good skin turgor, no skin breakdown.      :   Exam: [place] catheter in place.     LABS  --------------------------------------------------------------------------------------  Labs:  CAPILLARY BLOOD GLUCOSE      POCT Blood Glucose.: 145 mg/dL (04 Nov 2021 05:30)  POCT Blood Glucose.: 173 mg/dL (04 Nov 2021 01:32)  POCT Blood Glucose.: 288 mg/dL (03 Nov 2021 09:31)                          8.3    13.28 )-----------( 212      ( 04 Nov 2021 05:21 )             26.0         11-04    140  |  108<H>  |  66<H>  ----------------------------<  148<H>  5.6<H>   |  18<L>  |  2.39<H>      Calcium, Total Serum: 8.7 mg/dL (11-04-21 @ 05:21)      LFTs:     Blood Gas Arterial, Lactate: 1.8 mmol/L (11-04-21 @ 00:23)  Blood Gas Arterial, Lactate: 2.2 mmol/L (11-03-21 @ 23:15)  Blood Gas Arterial, Lactate: 1.7 mmol/L (11-03-21 @ 22:27)  Blood Gas Arterial, Lactate: 1.1 mmol/L (11-03-21 @ 21:24)  Blood Gas Arterial, Lactate: 1.3 mmol/L (11-03-21 @ 19:28)  Blood Gas Arterial, Lactate: 0.7 mmol/L (11-03-21 @ 18:09)  Blood Gas Arterial, Lactate: 0.6 mmol/L (11-03-21 @ 17:47)  Blood Gas Arterial, Lactate: 0.7 mmol/L (11-03-21 @ 15:51)  Blood Gas Arterial, Lactate: 0.6 mmol/L (11-03-21 @ 14:27)  Blood Gas Arterial, Lactate: 1.0 mmol/L (11-03-21 @ 12:24)    ABG - ( 04 Nov 2021 00:23 )  pH: 7.17  /  pCO2: 54    /  pO2: 341   / HCO3: 20    / Base Excess: -8.5  /  SaO2: 100.0           ABG - ( 03 Nov 2021 23:15 )  pH: 7.29  /  pCO2: 36    /  pO2: 181   / HCO3: 17    / Base Excess: -8.5  /  SaO2: 100.0           ABG - ( 03 Nov 2021 22:27 )  pH: 7.33  /  pCO2: 35    /  pO2: 188   / HCO3: 18    / Base Excess: -6.8  /  SaO2: 99.5              Coags:     11.4   ----< 0.99    ( 03 Nov 2021 07:10 )     36.0                      --------------------------------------------------------------------------------------    OTHER LABS    IMAGING RESULTS      ASSESSMENT:  71y Female ***    PLAN:   Neurologic:   Respiratory:   Cardiovascular:   Gastrointestinal/Nutrition:   Renal/Genitourinary:   Hematologic:   Infectious Disease:   Lines/Tubes:  Endocrine:   Disposition:     --------------------------------------------------------------------------------------    Critical Care Diagnoses:     SICU Consultation Note  =====================================================  HPI:   70yo F w/ pmh inclusive of htn, hcl, DM, CKD, breast ca, being treated outpt by ENT Dr. Gil for left ear malignancy, transferred from [facility] for ENT consult of Left ear infection. Pt was admitted at [facility] on 10/24 after experiencing syncopal episode and after extensive workup there, is now transferred here for ENT mngmt of Left ear malignancy as pt's ENT is out of [facility]. Pt reporting increased "burning" pain of left ear w/ increased foul smelling drainage from left ear lesion. No recent fever/chills, No photophobia/eye pain/changes in vision, no sore throat/dysphagia, No chest pain/palpitations, no SOB/cough/wheeze/stridor, No abdominal pain, No N/V/D, no dysuria/frequency/discharge, No neck/back pain, no rash, no new focal neuro symptoms.     POST OP:  L auriculectomy, left lateral temporal bone resection, with parotidectomy, and L SND 2-5 removed en bloc. reconstructed with L supraclavicular flap.  - Intubated  -     Surgery Information  OR time:      EBL:          IV Fluids:       Blood Products:   UOP:          PAST MEDICAL & SURGICAL HISTORY:  Cancer of ear      Home Meds: Home Medications:    Allergies: Allergies    No Known Allergies    Intolerances      Soc:   Advanced Directives: Presumed Full Code     ROS:    REVIEW OF SYSTEMS    [ ] A ten-point review of systems was otherwise negative except as noted.  [ ] Due to altered mental status/intubation, subjective information were not able to be obtained from the patient. History was obtained, to the extent possible, from review of the chart and collateral sources of information.      CURRENT MEDICATIONS:   --------------------------------------------------------------------------------------  Neurologic Medications  acetaminophen     Tablet .. 650 milliGRAM(s) Oral every 6 hours PRN Mild Pain (1 - 3)  fentaNYL    Injectable 25 MICROGram(s) IV Push every 5 minutes PRN Moderate Pain (4 - 6)  HYDROmorphone  Injectable 0.5 milliGRAM(s) IV Push every 10 minutes PRN Severe Pain (7 - 10)  oxyCODONE    IR 5 milliGRAM(s) Oral every 6 hours PRN Severe Pain (7 - 10)  oxyCODONE    IR 2.5 milliGRAM(s) Oral every 6 hours PRN Moderate Pain (4 - 6)  propofol Infusion 25 MICROgram(s)/kG/Min IV Continuous <Continuous>    Respiratory Medications    Cardiovascular Medications  hydrALAZINE 50 milliGRAM(s) Oral three times a day  hydrALAZINE Injectable 10 milliGRAM(s) IV Push every 4 hours PRN SBP>175    Gastrointestinal Medications  calcium gluconate IVPB 1 Gram(s) IV Intermittent once  dextrose 5%. 1000 milliLiter(s) IV Continuous <Continuous>  dextrose 5%. 1000 milliLiter(s) IV Continuous <Continuous>  lactated ringers. 1000 milliLiter(s) IV Continuous <Continuous>  lactated ringers. 1000 milliLiter(s) IV Continuous <Continuous>  polyethylene glycol 3350 17 Gram(s) Oral daily  senna 2 Tablet(s) Oral at bedtime  sodium bicarbonate 325 milliGRAM(s) Oral two times a day    Genitourinary Medications    Hematologic/Oncologic Medications  heparin   Injectable 5000 Unit(s) SubCutaneous every 12 hours  influenza  Vaccine (HIGH DOSE) 0.7 milliLiter(s) IntraMuscular once    Antimicrobial/Immunologic Medications  cefepime   IVPB 1000 milliGRAM(s) IV Intermittent every 12 hours    Endocrine/Metabolic Medications  atorvastatin 40 milliGRAM(s) Oral at bedtime  dextrose 40% Gel 15 Gram(s) Oral once  dextrose 50% Injectable 25 Gram(s) IV Push once  dextrose 50% Injectable 12.5 Gram(s) IV Push once  dextrose 50% Injectable 25 Gram(s) IV Push once  dextrose 50% Injectable 50 milliLiter(s) IV Push once  glucagon  Injectable 1 milliGRAM(s) IntraMuscular once  insulin glargine Injectable (LANTUS) 20 Unit(s) SubCutaneous at bedtime  insulin lispro (ADMELOG) corrective regimen sliding scale   SubCutaneous every 6 hours  insulin lispro Injectable (ADMELOG) 10 Unit(s) SubCutaneous three times a day before meals  insulin regular  human recombinant 10 Unit(s) IV Push once  levothyroxine Injectable 60 MICROGram(s) IV Push at bedtime    Topical/Other Medications  BACItracin   Ointment 1 Application(s) Topical two times a day  chlorhexidine 0.12% Liquid 15 milliLiter(s) Oral Mucosa every 12 hours    --------------------------------------------------------------------------------------    VITAL SIGNS, INS/OUTS (last 24 hours):  --------------------------------------------------------------------------------------  ICU Vital Signs Last 24 Hrs  T(C): 36.6 (04 Nov 2021 04:00), Max: 36.8 (03 Nov 2021 09:36)  T(F): 97.8 (04 Nov 2021 04:00), Max: 98.3 (03 Nov 2021 09:36)  HR: 59 (04 Nov 2021 06:00) (57 - 66)  BP: 143/55 (04 Nov 2021 06:00) (114/49 - 166/64)  BP(mean): 78 (04 Nov 2021 06:00) (65 - 93)  ABP: 141/50 (04 Nov 2021 06:00) (109/40 - 168/60)  ABP(mean): 86 (04 Nov 2021 06:00) (63 - 102)  RR: 13 (04 Nov 2021 06:00) (10 - 18)  SpO2: 100% (04 Nov 2021 06:00) (97% - 100%)    I&O's Summary    02 Nov 2021 07:01  -  03 Nov 2021 07:00  --------------------------------------------------------  IN: 1150 mL / OUT: 2550 mL / NET: -1400 mL    03 Nov 2021 07:01  -  04 Nov 2021 06:50  --------------------------------------------------------  IN: 390 mL / OUT: 402 mL / NET: -12 mL      --------------------------------------------------------------------------------------    EXAM:  General/Neuro  RASS:   GCS:   Exam: Normal, NAD, alert, oriented x 3, no focal deficits. PERRLA  ***    Respiratory  Exam: Lungs clear to auscultation, Normal expansion/effort.  ***  [] Tracheostomy   [] Intubated  Mechanical Ventilation:     Cardiovascular  Exam: S1, S2.  Regular rate and rhythm.  Peripheral edema  ***  Cardiac Rhythm: Normal Sinus Rhythm  ECHO:     GI  Exam: Abdomen soft, Non-tender, Non-distended.  Gastrostomy / Jejunostomy tube in place.  Nasogastric tube in place.  Colostomy / Ileostomy.  ***  Wound:   ***  Current Diet:  NPO***      Tubes/Lines/Drains  ***  [x] Peripheral IV  [] Central Venous Line     	[] R	[] L	[] IJ	[] Fem	[] SC        Type:	    Date Placed:   [] Arterial Line		[] R	[] L	[] Fem	[] Rad	[] Ax	Date Placed:   [] PICC:         	[] Midline		[] Mediport           [] Urinary Catheter		Date Placed:     Extremities  Exam: Extremities warm, pink, well-perfused.        Derm:  Exam: Good skin turgor, no skin breakdown.      :   Exam: [place] catheter in place.     LABS  --------------------------------------------------------------------------------------  Labs:  CAPILLARY BLOOD GLUCOSE      POCT Blood Glucose.: 145 mg/dL (04 Nov 2021 05:30)  POCT Blood Glucose.: 173 mg/dL (04 Nov 2021 01:32)  POCT Blood Glucose.: 288 mg/dL (03 Nov 2021 09:31)                          8.3    13.28 )-----------( 212      ( 04 Nov 2021 05:21 )             26.0         11-04    140  |  108<H>  |  66<H>  ----------------------------<  148<H>  5.6<H>   |  18<L>  |  2.39<H>      Calcium, Total Serum: 8.7 mg/dL (11-04-21 @ 05:21)      LFTs:     Blood Gas Arterial, Lactate: 1.8 mmol/L (11-04-21 @ 00:23)  Blood Gas Arterial, Lactate: 2.2 mmol/L (11-03-21 @ 23:15)  Blood Gas Arterial, Lactate: 1.7 mmol/L (11-03-21 @ 22:27)  Blood Gas Arterial, Lactate: 1.1 mmol/L (11-03-21 @ 21:24)  Blood Gas Arterial, Lactate: 1.3 mmol/L (11-03-21 @ 19:28)  Blood Gas Arterial, Lactate: 0.7 mmol/L (11-03-21 @ 18:09)  Blood Gas Arterial, Lactate: 0.6 mmol/L (11-03-21 @ 17:47)  Blood Gas Arterial, Lactate: 0.7 mmol/L (11-03-21 @ 15:51)  Blood Gas Arterial, Lactate: 0.6 mmol/L (11-03-21 @ 14:27)  Blood Gas Arterial, Lactate: 1.0 mmol/L (11-03-21 @ 12:24)    ABG - ( 04 Nov 2021 00:23 )  pH: 7.17  /  pCO2: 54    /  pO2: 341   / HCO3: 20    / Base Excess: -8.5  /  SaO2: 100.0           ABG - ( 03 Nov 2021 23:15 )  pH: 7.29  /  pCO2: 36    /  pO2: 181   / HCO3: 17    / Base Excess: -8.5  /  SaO2: 100.0           ABG - ( 03 Nov 2021 22:27 )  pH: 7.33  /  pCO2: 35    /  pO2: 188   / HCO3: 18    / Base Excess: -6.8  /  SaO2: 99.5              Coags:     11.4   ----< 0.99    ( 03 Nov 2021 07:10 )     36.0                      --------------------------------------------------------------------------------------    OTHER LABS    IMAGING RESULTS      ASSESSMENT:  71y Female ***    PLAN:     Neurologic:     Respiratory:     Cardiovascular:     Gastrointestinal/Nutrition:     Renal/Genitourinary:     Hematologic:     Infectious Disease:     Lines/Tubes:  -   Endocrine:   - ISS  - resume pre-meals after tolerating PO    Disposition:     --------------------------------------------------------------------------------------    Critical Care Diagnoses:     SICU Consultation Note  =====================================================  HPI:   70yo F w/ pmh inclusive of htn, hcl, DM, CKD, breast ca, being treated outpt by ENT Dr. Gil for left ear malignancy, transferred from [facility] for ENT consult of Left ear infection. Pt was admitted at [facility] on 10/24 after experiencing syncopal episode and after extensive workup there, is now transferred here for ENT mngmt of Left ear malignancy as pt's ENT is out of [facility]. Pt reporting increased "burning" pain of left ear w/ increased foul smelling drainage from left ear lesion. No recent fever/chills, No photophobia/eye pain/changes in vision, no sore throat/dysphagia, No chest pain/palpitations, no SOB/cough/wheeze/stridor, No abdominal pain, No N/V/D, no dysuria/frequency/discharge, No neck/back pain, no rash, no new focal neuro symptoms.     POST OP:  L auriculectomy, left lateral temporal bone resection, with parotidectomy, and L SND 2-5 removed en bloc. reconstructed with L supraclavicular flap.  - Intubated  -     Surgery Information  OR time:      EBL:          IV Fluids:       Blood Products:   UOP:          PAST MEDICAL & SURGICAL HISTORY:  Cancer of ear      Home Meds: Home Medications:    Allergies: Allergies    No Known Allergies    Intolerances      Soc:   Advanced Directives: Presumed Full Code     ROS:    REVIEW OF SYSTEMS    [x] A ten-point review of systems was otherwise negative except as noted.  [ ] Due to altered mental status/intubation, subjective information were not able to be obtained from the patient. History was obtained, to the extent possible, from review of the chart and collateral sources of information.      CURRENT MEDICATIONS:   --------------------------------------------------------------------------------------  Neurologic Medications  acetaminophen     Tablet .. 650 milliGRAM(s) Oral every 6 hours PRN Mild Pain (1 - 3)  fentaNYL    Injectable 25 MICROGram(s) IV Push every 5 minutes PRN Moderate Pain (4 - 6)  HYDROmorphone  Injectable 0.5 milliGRAM(s) IV Push every 10 minutes PRN Severe Pain (7 - 10)  oxyCODONE    IR 5 milliGRAM(s) Oral every 6 hours PRN Severe Pain (7 - 10)  oxyCODONE    IR 2.5 milliGRAM(s) Oral every 6 hours PRN Moderate Pain (4 - 6)  propofol Infusion 25 MICROgram(s)/kG/Min IV Continuous <Continuous>    Respiratory Medications    Cardiovascular Medications  hydrALAZINE 50 milliGRAM(s) Oral three times a day  hydrALAZINE Injectable 10 milliGRAM(s) IV Push every 4 hours PRN SBP>175    Gastrointestinal Medications  calcium gluconate IVPB 1 Gram(s) IV Intermittent once  dextrose 5%. 1000 milliLiter(s) IV Continuous <Continuous>  dextrose 5%. 1000 milliLiter(s) IV Continuous <Continuous>  lactated ringers. 1000 milliLiter(s) IV Continuous <Continuous>  lactated ringers. 1000 milliLiter(s) IV Continuous <Continuous>  polyethylene glycol 3350 17 Gram(s) Oral daily  senna 2 Tablet(s) Oral at bedtime  sodium bicarbonate 325 milliGRAM(s) Oral two times a day    Genitourinary Medications    Hematologic/Oncologic Medications  heparin   Injectable 5000 Unit(s) SubCutaneous every 12 hours  influenza  Vaccine (HIGH DOSE) 0.7 milliLiter(s) IntraMuscular once    Antimicrobial/Immunologic Medications  cefepime   IVPB 1000 milliGRAM(s) IV Intermittent every 12 hours    Endocrine/Metabolic Medications  atorvastatin 40 milliGRAM(s) Oral at bedtime  dextrose 40% Gel 15 Gram(s) Oral once  dextrose 50% Injectable 25 Gram(s) IV Push once  dextrose 50% Injectable 12.5 Gram(s) IV Push once  dextrose 50% Injectable 25 Gram(s) IV Push once  dextrose 50% Injectable 50 milliLiter(s) IV Push once  glucagon  Injectable 1 milliGRAM(s) IntraMuscular once  insulin glargine Injectable (LANTUS) 20 Unit(s) SubCutaneous at bedtime  insulin lispro (ADMELOG) corrective regimen sliding scale   SubCutaneous every 6 hours  insulin lispro Injectable (ADMELOG) 10 Unit(s) SubCutaneous three times a day before meals  insulin regular  human recombinant 10 Unit(s) IV Push once  levothyroxine Injectable 60 MICROGram(s) IV Push at bedtime    Topical/Other Medications  BACItracin   Ointment 1 Application(s) Topical two times a day  chlorhexidine 0.12% Liquid 15 milliLiter(s) Oral Mucosa every 12 hours    --------------------------------------------------------------------------------------    VITAL SIGNS, INS/OUTS (last 24 hours):  --------------------------------------------------------------------------------------  ICU Vital Signs Last 24 Hrs  T(C): 36.6 (04 Nov 2021 04:00), Max: 36.8 (03 Nov 2021 09:36)  T(F): 97.8 (04 Nov 2021 04:00), Max: 98.3 (03 Nov 2021 09:36)  HR: 59 (04 Nov 2021 06:00) (57 - 66)  BP: 143/55 (04 Nov 2021 06:00) (114/49 - 166/64)  BP(mean): 78 (04 Nov 2021 06:00) (65 - 93)  ABP: 141/50 (04 Nov 2021 06:00) (109/40 - 168/60)  ABP(mean): 86 (04 Nov 2021 06:00) (63 - 102)  RR: 13 (04 Nov 2021 06:00) (10 - 18)  SpO2: 100% (04 Nov 2021 06:00) (97% - 100%)    I&O's Summary    02 Nov 2021 07:01  -  03 Nov 2021 07:00  --------------------------------------------------------  IN: 1150 mL / OUT: 2550 mL / NET: -1400 mL    03 Nov 2021 07:01  -  04 Nov 2021 06:50  --------------------------------------------------------  IN: 390 mL / OUT: 402 mL / NET: -12 mL      --------------------------------------------------------------------------------------    EXAM:  General/Neuro  RASS:   GCS:   Exam: Normal, NAD, alert, oriented x 3, no focal deficits. PERRLA  ***    Respiratory  Exam: Lungs clear to auscultation, Normal expansion/effort.  ***  [] Tracheostomy   [] Intubated  Mechanical Ventilation:     Cardiovascular  Exam: S1, S2.  Regular rate and rhythm.  Peripheral edema  ***  Cardiac Rhythm: Normal Sinus Rhythm  ECHO:     GI  Exam: Abdomen soft, Non-tender, Non-distended.  Gastrostomy / Jejunostomy tube in place.  Nasogastric tube in place.  Colostomy / Ileostomy.  ***  Wound:   ***  Current Diet:  NPO***      Tubes/Lines/Drains  ***  [x] Peripheral IV  [] Central Venous Line     	[] R	[] L	[] IJ	[] Fem	[] SC        Type:	    Date Placed:   [] Arterial Line		[] R	[] L	[] Fem	[] Rad	[] Ax	Date Placed:   [] PICC:         	[] Midline		[] Mediport           [] Urinary Catheter		Date Placed:     Extremities  Exam: Extremities warm, pink, well-perfused.        Derm:  Exam: Good skin turgor, no skin breakdown.      :   Exam: [place] catheter in place.     LABS  --------------------------------------------------------------------------------------  Labs:  CAPILLARY BLOOD GLUCOSE      POCT Blood Glucose.: 145 mg/dL (04 Nov 2021 05:30)  POCT Blood Glucose.: 173 mg/dL (04 Nov 2021 01:32)  POCT Blood Glucose.: 288 mg/dL (03 Nov 2021 09:31)                          8.3    13.28 )-----------( 212      ( 04 Nov 2021 05:21 )             26.0         11-04    140  |  108<H>  |  66<H>  ----------------------------<  148<H>  5.6<H>   |  18<L>  |  2.39<H>      Calcium, Total Serum: 8.7 mg/dL (11-04-21 @ 05:21)      LFTs:     Blood Gas Arterial, Lactate: 1.8 mmol/L (11-04-21 @ 00:23)  Blood Gas Arterial, Lactate: 2.2 mmol/L (11-03-21 @ 23:15)  Blood Gas Arterial, Lactate: 1.7 mmol/L (11-03-21 @ 22:27)  Blood Gas Arterial, Lactate: 1.1 mmol/L (11-03-21 @ 21:24)  Blood Gas Arterial, Lactate: 1.3 mmol/L (11-03-21 @ 19:28)  Blood Gas Arterial, Lactate: 0.7 mmol/L (11-03-21 @ 18:09)  Blood Gas Arterial, Lactate: 0.6 mmol/L (11-03-21 @ 17:47)  Blood Gas Arterial, Lactate: 0.7 mmol/L (11-03-21 @ 15:51)  Blood Gas Arterial, Lactate: 0.6 mmol/L (11-03-21 @ 14:27)  Blood Gas Arterial, Lactate: 1.0 mmol/L (11-03-21 @ 12:24)    ABG - ( 04 Nov 2021 00:23 )  pH: 7.17  /  pCO2: 54    /  pO2: 341   / HCO3: 20    / Base Excess: -8.5  /  SaO2: 100.0           ABG - ( 03 Nov 2021 23:15 )  pH: 7.29  /  pCO2: 36    /  pO2: 181   / HCO3: 17    / Base Excess: -8.5  /  SaO2: 100.0           ABG - ( 03 Nov 2021 22:27 )  pH: 7.33  /  pCO2: 35    /  pO2: 188   / HCO3: 18    / Base Excess: -6.8  /  SaO2: 99.5              Coags:     11.4   ----< 0.99    ( 03 Nov 2021 07:10 )     36.0                      --------------------------------------------------------------------------------------    OTHER LABS    IMAGING RESULTS      ASSESSMENT:  71y Female     PLAN:     Neurologic:     Respiratory:     Cardiovascular:     Gastrointestinal/Nutrition:     Renal/Genitourinary:       Hematologic:   - VTE prophylaxis: SubQ heparin Q8h    Infectious Disease:       Lines/Tubes:  -     Endocrine:   - ISS  - resume pre-meals after tolerating PO    Disposition:     --------------------------------------------------------------------------------------    Critical Care Diagnoses:     SICU Consultation Note  =====================================================  HPI:   70yo F w/ pmh inclusive of htn, hcl, DM, CKD, breast ca, being treated outpt by ENT Dr. Gil for left ear malignancy, transferred from [facility] for ENT consult of Left ear infection. Pt was admitted at [facility] on 10/24 after experiencing syncopal episode and after extensive workup there, is now transferred here for ENT mngmt of Left ear malignancy as pt's ENT is out of [facility]. Pt reporting increased "burning" pain of left ear w/ increased foul smelling drainage from left ear lesion. No recent fever/chills, No photophobia/eye pain/changes in vision, no sore throat/dysphagia, No chest pain/palpitations, no SOB/cough/wheeze/stridor, No abdominal pain, No N/V/D, no dysuria/frequency/discharge, No neck/back pain, no rash, no new focal neuro symptoms.     POST OP:  L auriculectomy, left lateral temporal bone resection, with parotidectomy, and L SND 2-5 removed en bloc. Reconstructed with L supraclavicular flap.  - Intubated      Surgery Information  OR time:      EBL:   300cc       IV Fluids:       Blood Products:   UOP:          PAST MEDICAL & SURGICAL HISTORY:  Cancer of ear    Home Meds: Home Medications:    Allergies: Allergies    No Known Allergies    Intolerances      Soc:   Advanced Directives: Presumed Full Code     ROS:    REVIEW OF SYSTEMS    [x] A ten-point review of systems was otherwise negative except as noted.  [ ] Due to altered mental status/intubation, subjective information were not able to be obtained from the patient. History was obtained, to the extent possible, from review of the chart and collateral sources of information.      CURRENT MEDICATIONS:   --------------------------------------------------------------------------------------  Neurologic Medications  acetaminophen     Tablet .. 650 milliGRAM(s) Oral every 6 hours PRN Mild Pain (1 - 3)  fentaNYL    Injectable 25 MICROGram(s) IV Push every 5 minutes PRN Moderate Pain (4 - 6)  HYDROmorphone  Injectable 0.5 milliGRAM(s) IV Push every 10 minutes PRN Severe Pain (7 - 10)  oxyCODONE    IR 5 milliGRAM(s) Oral every 6 hours PRN Severe Pain (7 - 10)  oxyCODONE    IR 2.5 milliGRAM(s) Oral every 6 hours PRN Moderate Pain (4 - 6)  propofol Infusion 25 MICROgram(s)/kG/Min IV Continuous <Continuous>    Respiratory Medications    Cardiovascular Medications  hydrALAZINE 50 milliGRAM(s) Oral three times a day  hydrALAZINE Injectable 10 milliGRAM(s) IV Push every 4 hours PRN SBP>175    Gastrointestinal Medications  calcium gluconate IVPB 1 Gram(s) IV Intermittent once  dextrose 5%. 1000 milliLiter(s) IV Continuous <Continuous>  dextrose 5%. 1000 milliLiter(s) IV Continuous <Continuous>  lactated ringers. 1000 milliLiter(s) IV Continuous <Continuous>  lactated ringers. 1000 milliLiter(s) IV Continuous <Continuous>  polyethylene glycol 3350 17 Gram(s) Oral daily  senna 2 Tablet(s) Oral at bedtime  sodium bicarbonate 325 milliGRAM(s) Oral two times a day    Genitourinary Medications    Hematologic/Oncologic Medications  heparin   Injectable 5000 Unit(s) SubCutaneous every 12 hours  influenza  Vaccine (HIGH DOSE) 0.7 milliLiter(s) IntraMuscular once    Antimicrobial/Immunologic Medications  cefepime   IVPB 1000 milliGRAM(s) IV Intermittent every 12 hours    Endocrine/Metabolic Medications  atorvastatin 40 milliGRAM(s) Oral at bedtime  dextrose 40% Gel 15 Gram(s) Oral once  dextrose 50% Injectable 25 Gram(s) IV Push once  dextrose 50% Injectable 12.5 Gram(s) IV Push once  dextrose 50% Injectable 25 Gram(s) IV Push once  dextrose 50% Injectable 50 milliLiter(s) IV Push once  glucagon  Injectable 1 milliGRAM(s) IntraMuscular once  insulin glargine Injectable (LANTUS) 20 Unit(s) SubCutaneous at bedtime  insulin lispro (ADMELOG) corrective regimen sliding scale   SubCutaneous every 6 hours  insulin lispro Injectable (ADMELOG) 10 Unit(s) SubCutaneous three times a day before meals  insulin regular  human recombinant 10 Unit(s) IV Push once  levothyroxine Injectable 60 MICROGram(s) IV Push at bedtime    Topical/Other Medications  BACItracin   Ointment 1 Application(s) Topical two times a day  chlorhexidine 0.12% Liquid 15 milliLiter(s) Oral Mucosa every 12 hours    --------------------------------------------------------------------------------------    VITAL SIGNS, INS/OUTS (last 24 hours):  --------------------------------------------------------------------------------------  ICU Vital Signs Last 24 Hrs  T(C): 36.6 (04 Nov 2021 04:00), Max: 36.8 (03 Nov 2021 09:36)  T(F): 97.8 (04 Nov 2021 04:00), Max: 98.3 (03 Nov 2021 09:36)  HR: 59 (04 Nov 2021 06:00) (57 - 66)  BP: 143/55 (04 Nov 2021 06:00) (114/49 - 166/64)  BP(mean): 78 (04 Nov 2021 06:00) (65 - 93)  ABP: 141/50 (04 Nov 2021 06:00) (109/40 - 168/60)  ABP(mean): 86 (04 Nov 2021 06:00) (63 - 102)  RR: 13 (04 Nov 2021 06:00) (10 - 18)  SpO2: 100% (04 Nov 2021 06:00) (97% - 100%)    I&O's Summary    02 Nov 2021 07:01  -  03 Nov 2021 07:00  --------------------------------------------------------  IN: 1150 mL / OUT: 2550 mL / NET: -1400 mL    03 Nov 2021 07:01  -  04 Nov 2021 06:50  --------------------------------------------------------  IN: 390 mL / OUT: 402 mL / NET: -12 mL      --------------------------------------------------------------------------------------    EXAM:  General/Neuro  RASS:   GCS:   Exam: intubated, sedated    Respiratory  Exam:   [] Tracheostomy   [x] Intubated  Mechanical Ventilation: CPAP 10/5/40    Cardiovascular  Exam: S1, S2.  Regular rate and rhythm  Cardiac Rhythm: Normal Sinus Rhythm  ECHO:     GI  Exam: Abdomen soft, Non-tender, Non-distended.  Gastrostomy / Jejunostomy tube in place.  Nasogastric tube in place.  Colostomy / Ileostomy.  Wound:   Current Diet:  NPO      Tubes/Lines/Drains  [x] Peripheral IV  [] Central Venous Line     	[] R	[] L	[] IJ	[] Fem	[] SC        Type:	    Date Placed:   [x] Arterial Line		[x] R	[] L	[] Fem	[x] Rad	[] Ax	Date Placed:   [] PICC:         	[] Midline		[] Mediport           [] Urinary Catheter		Date Placed:     Extremities  Exam: Extremities warm, pink, well-perfused.        Derm:  Exam: Good skin turgor, no skin breakdown.      :   Exam: Monten[place]ro catheter in place.     LABS  --------------------------------------------------------------------------------------  Labs:  CAPILLARY BLOOD GLUCOSE      POCT Blood Glucose.: 145 mg/dL (04 Nov 2021 05:30)  POCT Blood Glucose.: 173 mg/dL (04 Nov 2021 01:32)  POCT Blood Glucose.: 288 mg/dL (03 Nov 2021 09:31)                          8.3    13.28 )-----------( 212      ( 04 Nov 2021 05:21 )             26.0         11-04    140  |  108<H>  |  66<H>  ----------------------------<  148<H>  5.6<H>   |  18<L>  |  2.39<H>      Calcium, Total Serum: 8.7 mg/dL (11-04-21 @ 05:21)      LFTs:     Blood Gas Arterial, Lactate: 1.8 mmol/L (11-04-21 @ 00:23)  Blood Gas Arterial, Lactate: 2.2 mmol/L (11-03-21 @ 23:15)  Blood Gas Arterial, Lactate: 1.7 mmol/L (11-03-21 @ 22:27)  Blood Gas Arterial, Lactate: 1.1 mmol/L (11-03-21 @ 21:24)  Blood Gas Arterial, Lactate: 1.3 mmol/L (11-03-21 @ 19:28)  Blood Gas Arterial, Lactate: 0.7 mmol/L (11-03-21 @ 18:09)  Blood Gas Arterial, Lactate: 0.6 mmol/L (11-03-21 @ 17:47)  Blood Gas Arterial, Lactate: 0.7 mmol/L (11-03-21 @ 15:51)  Blood Gas Arterial, Lactate: 0.6 mmol/L (11-03-21 @ 14:27)  Blood Gas Arterial, Lactate: 1.0 mmol/L (11-03-21 @ 12:24)    ABG - ( 04 Nov 2021 00:23 )  pH: 7.17  /  pCO2: 54    /  pO2: 341   / HCO3: 20    / Base Excess: -8.5  /  SaO2: 100.0           ABG - ( 03 Nov 2021 23:15 )  pH: 7.29  /  pCO2: 36    /  pO2: 181   / HCO3: 17    / Base Excess: -8.5  /  SaO2: 100.0           ABG - ( 03 Nov 2021 22:27 )  pH: 7.33  /  pCO2: 35    /  pO2: 188   / HCO3: 18    / Base Excess: -6.8  /  SaO2: 99.5              Coags:     11.4   ----< 0.99    ( 03 Nov 2021 07:10 )     36.0                      --------------------------------------------------------------------------------------    OTHER LABS    IMAGING RESULTS      ASSESSMENT:  71y Female     PLAN:     Neurologic:     Respiratory:     Cardiovascular:     Gastrointestinal/Nutrition:     Renal/Genitourinary:       Hematologic:   - VTE prophylaxis: SubQ heparin Q8h    Infectious Disease:       Lines/Tubes:  -     Endocrine:   - ISS  - resume pre-meals after tolerating PO    Disposition:     --------------------------------------------------------------------------------------    Critical Care Diagnoses:     SICU Consultation Note  =====================================================  HPI:   70yo F w/ pmh inclusive of htn, hcl, DM, CKD, breast ca, being treated outpt by ENT Dr. Gil for left ear malignancy, transferred from [facility] for ENT consult of Left ear infection. Pt was admitted at [facility] on 10/24 after experiencing syncopal episode and after extensive workup there, is now transferred here for ENT mngmt of Left ear malignancy as pt's ENT is out of [facility]. Pt reporting increased "burning" pain of left ear w/ increased foul smelling drainage from left ear lesion. No recent fever/chills, No photophobia/eye pain/changes in vision, no sore throat/dysphagia, No chest pain/palpitations, no SOB/cough/wheeze/stridor, No abdominal pain, No N/V/D, no dysuria/frequency/discharge, No neck/back pain, no rash, no new focal neuro symptoms.     POST OP:  L auriculectomy, left lateral temporal bone resection, with parotidectomy, and L SND 2-5 removed en bloc. Reconstructed with L supraclavicular flap.  - Intubated    Surgery Information  OR time:      EBL:   300cc       IV Fluids:       Blood Products:   UOP:          PAST MEDICAL & SURGICAL HISTORY:  Cancer of ear    Home Meds: Home Medications:    Allergies: Allergies    No Known Allergies    Intolerances      Soc:   Advanced Directives: Presumed Full Code     ROS:    REVIEW OF SYSTEMS    [x] A ten-point review of systems was otherwise negative except as noted.  [ ] Due to altered mental status/intubation, subjective information were not able to be obtained from the patient. History was obtained, to the extent possible, from review of the chart and collateral sources of information.      CURRENT MEDICATIONS:   --------------------------------------------------------------------------------------  Neurologic Medications  acetaminophen     Tablet .. 650 milliGRAM(s) Oral every 6 hours PRN Mild Pain (1 - 3)  fentaNYL    Injectable 25 MICROGram(s) IV Push every 5 minutes PRN Moderate Pain (4 - 6)  HYDROmorphone  Injectable 0.5 milliGRAM(s) IV Push every 10 minutes PRN Severe Pain (7 - 10)  oxyCODONE    IR 5 milliGRAM(s) Oral every 6 hours PRN Severe Pain (7 - 10)  oxyCODONE    IR 2.5 milliGRAM(s) Oral every 6 hours PRN Moderate Pain (4 - 6)  propofol Infusion 25 MICROgram(s)/kG/Min IV Continuous <Continuous>    Respiratory Medications    Cardiovascular Medications  hydrALAZINE 50 milliGRAM(s) Oral three times a day  hydrALAZINE Injectable 10 milliGRAM(s) IV Push every 4 hours PRN SBP>175    Gastrointestinal Medications  calcium gluconate IVPB 1 Gram(s) IV Intermittent once  dextrose 5%. 1000 milliLiter(s) IV Continuous <Continuous>  dextrose 5%. 1000 milliLiter(s) IV Continuous <Continuous>  lactated ringers. 1000 milliLiter(s) IV Continuous <Continuous>  lactated ringers. 1000 milliLiter(s) IV Continuous <Continuous>  polyethylene glycol 3350 17 Gram(s) Oral daily  senna 2 Tablet(s) Oral at bedtime  sodium bicarbonate 325 milliGRAM(s) Oral two times a day    Genitourinary Medications    Hematologic/Oncologic Medications  heparin   Injectable 5000 Unit(s) SubCutaneous every 12 hours  influenza  Vaccine (HIGH DOSE) 0.7 milliLiter(s) IntraMuscular once    Antimicrobial/Immunologic Medications  cefepime   IVPB 1000 milliGRAM(s) IV Intermittent every 12 hours    Endocrine/Metabolic Medications  atorvastatin 40 milliGRAM(s) Oral at bedtime  dextrose 40% Gel 15 Gram(s) Oral once  dextrose 50% Injectable 25 Gram(s) IV Push once  dextrose 50% Injectable 12.5 Gram(s) IV Push once  dextrose 50% Injectable 25 Gram(s) IV Push once  dextrose 50% Injectable 50 milliLiter(s) IV Push once  glucagon  Injectable 1 milliGRAM(s) IntraMuscular once  insulin glargine Injectable (LANTUS) 20 Unit(s) SubCutaneous at bedtime  insulin lispro (ADMELOG) corrective regimen sliding scale   SubCutaneous every 6 hours  insulin lispro Injectable (ADMELOG) 10 Unit(s) SubCutaneous three times a day before meals  insulin regular  human recombinant 10 Unit(s) IV Push once  levothyroxine Injectable 60 MICROGram(s) IV Push at bedtime    Topical/Other Medications  BACItracin   Ointment 1 Application(s) Topical two times a day  chlorhexidine 0.12% Liquid 15 milliLiter(s) Oral Mucosa every 12 hours    --------------------------------------------------------------------------------------    VITAL SIGNS, INS/OUTS (last 24 hours):  --------------------------------------------------------------------------------------  ICU Vital Signs Last 24 Hrs  T(C): 36.6 (04 Nov 2021 04:00), Max: 36.8 (03 Nov 2021 09:36)  T(F): 97.8 (04 Nov 2021 04:00), Max: 98.3 (03 Nov 2021 09:36)  HR: 59 (04 Nov 2021 06:00) (57 - 66)  BP: 143/55 (04 Nov 2021 06:00) (114/49 - 166/64)  BP(mean): 78 (04 Nov 2021 06:00) (65 - 93)  ABP: 141/50 (04 Nov 2021 06:00) (109/40 - 168/60)  ABP(mean): 86 (04 Nov 2021 06:00) (63 - 102)  RR: 13 (04 Nov 2021 06:00) (10 - 18)  SpO2: 100% (04 Nov 2021 06:00) (97% - 100%)    I&O's Summary    02 Nov 2021 07:01  -  03 Nov 2021 07:00  --------------------------------------------------------  IN: 1150 mL / OUT: 2550 mL / NET: -1400 mL    03 Nov 2021 07:01  -  04 Nov 2021 06:50  --------------------------------------------------------  IN: 390 mL / OUT: 402 mL / NET: -12 mL      --------------------------------------------------------------------------------------    EXAM:  General/Neuro  RASS:   GCS:   Exam: intubated, sedated    Respiratory  Exam:   [] Tracheostomy   [x] Intubated  Mechanical Ventilation: CPAP 10/5/40    Cardiovascular  Exam: S1, S2.  Regular rate and rhythm  Cardiac Rhythm: Normal Sinus Rhythm  ECHO:     GI  Exam: Abdomen soft, Non-tender, Non-distended.  Gastrostomy / Jejunostomy tube in place.  Nasogastric tube in place.  Colostomy / Ileostomy.  Wound:   Current Diet:  NPO      Tubes/Lines/Drains  [x] Peripheral IV  [] Central Venous Line     	[] R	[] L	[] IJ	[] Fem	[] SC        Type:	    Date Placed:   [x] Arterial Line		[x] R	[] L	[] Fem	[x] Rad	[] Ax	Date Placed:   [] PICC:         	[] Midline		[] Mediport           [] Urinary Catheter		Date Placed:     Extremities  Exam: Extremities warm, pink, well-perfused.        Derm:  Exam: Good skin turgor, no skin breakdown.      :   Exam: Monten[place]ro catheter in place.     LABS  --------------------------------------------------------------------------------------  Labs:  CAPILLARY BLOOD GLUCOSE      POCT Blood Glucose.: 145 mg/dL (04 Nov 2021 05:30)  POCT Blood Glucose.: 173 mg/dL (04 Nov 2021 01:32)  POCT Blood Glucose.: 288 mg/dL (03 Nov 2021 09:31)                          8.3    13.28 )-----------( 212      ( 04 Nov 2021 05:21 )             26.0         11-04    140  |  108<H>  |  66<H>  ----------------------------<  148<H>  5.6<H>   |  18<L>  |  2.39<H>      Calcium, Total Serum: 8.7 mg/dL (11-04-21 @ 05:21)      LFTs:     Blood Gas Arterial, Lactate: 1.8 mmol/L (11-04-21 @ 00:23)  Blood Gas Arterial, Lactate: 2.2 mmol/L (11-03-21 @ 23:15)  Blood Gas Arterial, Lactate: 1.7 mmol/L (11-03-21 @ 22:27)  Blood Gas Arterial, Lactate: 1.1 mmol/L (11-03-21 @ 21:24)  Blood Gas Arterial, Lactate: 1.3 mmol/L (11-03-21 @ 19:28)  Blood Gas Arterial, Lactate: 0.7 mmol/L (11-03-21 @ 18:09)  Blood Gas Arterial, Lactate: 0.6 mmol/L (11-03-21 @ 17:47)  Blood Gas Arterial, Lactate: 0.7 mmol/L (11-03-21 @ 15:51)  Blood Gas Arterial, Lactate: 0.6 mmol/L (11-03-21 @ 14:27)  Blood Gas Arterial, Lactate: 1.0 mmol/L (11-03-21 @ 12:24)    ABG - ( 04 Nov 2021 00:23 )  pH: 7.17  /  pCO2: 54    /  pO2: 341   / HCO3: 20    / Base Excess: -8.5  /  SaO2: 100.0           ABG - ( 03 Nov 2021 23:15 )  pH: 7.29  /  pCO2: 36    /  pO2: 181   / HCO3: 17    / Base Excess: -8.5  /  SaO2: 100.0           ABG - ( 03 Nov 2021 22:27 )  pH: 7.33  /  pCO2: 35    /  pO2: 188   / HCO3: 18    / Base Excess: -6.8  /  SaO2: 99.5              Coags:     11.4   ----< 0.99    ( 03 Nov 2021 07:10 )     36.0                      --------------------------------------------------------------------------------------    OTHER LABS    IMAGING RESULTS

## 2021-11-04 NOTE — PROGRESS NOTE ADULT - PROBLEM SELECTOR PLAN 1
Pt. with advanced CKD stage 4 in setting of longstanding DM and HTN. On review of labs on [facility], patient noted to have elevated Scr of 1.57 on 9/24/15. Scr progressively increased over the years and was 2.17 on 9/16/19. on admission Scr was 2.45 on 10/29/21. Scr elevated/stable at 3.01 on 11/2/21. Scr was at 2.69 on 11/3/21. Scr is at 2.40 today. Pt. underwent Left temporal resection on 11/3/21. Currently D5W+ 0.45NS@ 100cc/hr. Recommend changing IVF to D5W + NS @ 100cc/hr. Monitor labs and urine output. Avoid any potential nephrotoxins. Discontinue ACE-I. Dose meds as per eGFR.

## 2021-11-04 NOTE — CONSULT NOTE ADULT - ASSESSMENT
ASSESSMENT:  70yo F w/ pmh inclusive of htn, hcl, DM, CKD, breast ca, being treated outpt by ENT Dr. Gil for left ear malignancy, transferred from [facility] for ENT consult of Left ear infection. Pt was admitted at [facility] on 10/24 after experiencing syncopal episode and after extensive workup there, is now transferred here for ENT mngmt of Left ear malignancy s/p L auriculectomy, left lateral temporal bone resection, with parotidectomy, and L SND 2-5 removed en bloc. Reconstructed with L supraclavicular flap 11/4.      PLAN:     Neurologic:   - IV tylenol PRN Q6 for pain  - sedated on propofol, wean as tolerated    Respiratory:   - intubated wean as tolerated    Cardiovascular:   - hypertension to 170s  - takes hydralizine 50mg TID at home  - IV hydralizine PRN for SBP >175 10mg Q4   - c/w home atorvastatin    Gastrointestinal/Nutrition:   - NPO    Renal/Genitourinary:   - acidemia  - f/u rpt abg  - K 5.2 on labs, monitor for worsening hyperkalemia  - D5 1/2NS @ 100/hr    Hematologic:   - VTE prophylaxis: SubQ heparin Q12h    Infectious Disease:   - c/w cefepime  - monitor for fevers  - bacitracin ointment on ear    Lines/Tubes:  - ETT  - PIV  - a-line  - baptiste catheter    Endocrine:   - ISS  - resume pre-meals after tolerating PO  - hypothyroidism, c/w levothyroxine 60mg at bedtime    Disposition: SICU    --------------------------------------------------------------------------------------    Critical Care Diagnoses:  - intubation  - severe acidemia  - Flap checks

## 2021-11-04 NOTE — DIETITIAN INITIAL EVALUATION ADULT. - OTHER INFO
70 y/o female with malignant SCC ear now s/p L temporal bone resection 11/3 with transfer to SICU management. Pt intubated and sedated on propofol. Propofol contributing approx 99 non-nutrative lipid calories over the past 24 hrs. Receiving IVPB fluids. Pt currently NPO with plan to wean off sedation and extubate. No GI distress noted. Once extubated and off sedation, suggest advancing diet as medically feasible and tolerated. RDN services to remain available as needed.

## 2021-11-04 NOTE — CONSULT NOTE ADULT - ATTENDING COMMENTS
I agree with the detailed interval history, physical, and plan, which I have reviewed and edited where appropriate'; also agree with notes/assessment with my team on service.  I have personally examined the patient.  I was physically present for the key portions of the evaluation and management (E/M) service provided.  I reviewed all the pertinent data.  The patient is a critical care patient with life threatening hemodynamic and metabolic instability in SICU.  The SICU team has a constant risk benefit analyzes discussion and coordinating care with the primary team and all consultants.   The patient is in SICU with the chief complaint and diagnosis mentioned in the note.   The plan will be specified in the note.  72yo F sp free flap in respiratory insufficiency consulted SICU for hemodynamic monitoring.  Respiratory  Exam: Lungs clear   Cardiovascular  Exam: Regular rate and rhythm.    GI  Exam: Abdomen soft, Non-tender, Non-distended.  Gastrostomy / Jejunostomy tube in place.  Nasogastric tube in place.  Colostomy / Ileostomy.  PLAN.  acute respiratory insufficiency; weaning trial.  Free flap; flap checks Q1Hr.  HTN; home htn meds.
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 Pt was admitted at [facility] on 10/24 after experiencing syncopal episode and after extensive workup there, is now transferred here for further management. Pt reporting increased "burning" pain of left ear w/ increased foul smelling drainage from left ear lesion since last few months. Wound cultures from [facility] noted, (collected 10/25), with proteus mirabilis and pseudomonas aeruginosa.    Wound cultures from [facility] noted, (collected 10/25), with proteus mirabilis and pseudomonas aeruginosa.   From [facility]: Wound cx from 8/30/21: Pan sensitive pseudomonas    IMPRESSION  Fungating Left Ear SCC with concern for superinfection   Pseudomonas and proteus infection, polymicrobial infection.       RECOMMENDATIONs  - afebrile with no leukocytosis and stable vital  -  Pt currently on cefepime -> Agree with coverage. Continue for now  - s/p vancomycin 1500mg x 1 dose, no need for additional vanco, no MRSA infection.   - Left ear discharge cx sent  - Blood cx if pt spikes fevers.     Tan Mariano  Pager: [phone]. If no response or past 5 pm call [phone].
Pt. with advanced CKD stage 4. Scr elevated/stable at 2.45 today. Assessment and plan as outlined above. Monitor labs and urine output. Avoid any potential nephrotoxins. Dose medications as per eGFR.

## 2021-11-04 NOTE — PROGRESS NOTE ADULT - SUBJECTIVE AND OBJECTIVE BOX
[facility] DIVISION OF KIDNEY DISEASES AND HYPERTENSION -- FOLLOW UP NOTE  --------------------------------------------------------------------------------  HPI: 71-year-old female with longstanding history of DM (>40 years), HTN, breast cancer, left ear malignancy and CKD was initially admitted to [facility] on 10/24/21 for L ear infection, then transferred to [facility] on 10/29/21 for further ENT management. Planned for lateral temporal bone resection by ENT team. Nephrology consulted for CKD management. On review of labs on [facility], patient noted to have elevated Scr of 1.57 on 9/24/15. Scr progressively increased over the years and was 2.17 on 9/16/19. Scr was 2.63 on 10/29/21 and it increased to 3.01 on 11/2/21. Scr is at 2.69 today. Pt. has been aware of her kidney disease and saw Dr. Dk Rose few months ago as outpatient in [place], [place]. Pt. underwent L auriculectomy, left lateral temporal bone resection, with parotidectomy on 11/3/21.     Pt. seen and examined today.  Pt. currently intubated and on [facility] vent post surgery. Unable to obtain ROS    PAST HISTORY  --------------------------------------------------------------------------------  No significant changes to PMH, PSH, FHx, SHx, unless otherwise noted    ALLERGIES & MEDICATIONS  --------------------------------------------------------------------------------  Allergies    No Known Allergies    Intolerances    Standing Inpatient Medications  atorvastatin 40 milliGRAM(s) Oral at bedtime  BACItracin   Ointment 1 Application(s) Topical two times a day  cefepime   IVPB 1000 milliGRAM(s) IV Intermittent every 12 hours  chlorhexidine 0.12% Liquid 15 milliLiter(s) Oral Mucosa every 12 hours  dextrose 40% Gel 15 Gram(s) Oral once  dextrose 5% + sodium chloride 0.45%. 1000 milliLiter(s) IV Continuous <Continuous>  dextrose 5%. 1000 milliLiter(s) IV Continuous <Continuous>  dextrose 5%. 1000 milliLiter(s) IV Continuous <Continuous>  dextrose 50% Injectable 25 Gram(s) IV Push once  dextrose 50% Injectable 12.5 Gram(s) IV Push once  dextrose 50% Injectable 25 Gram(s) IV Push once  glucagon  Injectable 1 milliGRAM(s) IntraMuscular once  heparin   Injectable 5000 Unit(s) SubCutaneous every 12 hours  influenza  Vaccine (HIGH DOSE) 0.7 milliLiter(s) IntraMuscular once  insulin glargine Injectable (LANTUS) 20 Unit(s) SubCutaneous at bedtime  insulin lispro (ADMELOG) corrective regimen sliding scale   SubCutaneous every 6 hours  insulin lispro Injectable (ADMELOG) 10 Unit(s) SubCutaneous three times a day before meals  levothyroxine Injectable 60 MICROGram(s) IV Push at bedtime  polyethylene glycol 3350 17 Gram(s) Oral daily  senna 2 Tablet(s) Oral at bedtime  sodium bicarbonate 325 milliGRAM(s) Oral two times a day    PRN Inpatient Medications  acetaminophen     Tablet .. 650 milliGRAM(s) Oral every 6 hours PRN  hydrALAZINE Injectable 10 milliGRAM(s) IV Push every 4 hours PRN  hydrALAZINE Injectable 10 milliGRAM(s) IV Push every 4 hours PRN  oxyCODONE    IR 2.5 milliGRAM(s) Oral every 6 hours PRN  oxyCODONE    IR 5 milliGRAM(s) Oral every 6 hours PRN    REVIEW OF SYSTEMS  --------------------------------------------------------------------------------  Unable to obtain ROS    VITALS/PHYSICAL EXAM  --------------------------------------------------------------------------------  T(C): 36.1 (11-04-21 @ 12:00), Max: 36.8 (11-04-21 @ 01:30)  HR: 75 (11-04-21 @ 13:00) (57 - 76)  BP: 145/84 (11-04-21 @ 09:30) (104/54 - 166/64)  RR: 15 (11-04-21 @ 13:00) (10 - 16)  SpO2: 100% (11-04-21 @ 13:00) (100% - 100%)  Wt(kg): --  Height (cm): 154.9 (11-03-21 @ 01:12)  Weight (kg): 107.9 (11-03-21 @ 01:12)  BMI (kg/m2): 45 (11-03-21 @ 01:12)  BSA (m2): 2.03 (11-03-21 @ 01:12)    11-03-21 @ 07:01  -  11-04-21 @ 07:00  --------------------------------------------------------  IN: 390 mL / OUT: 577 mL / NET: -187 mL    11-04-21 @ 07:01  -  11-04-21 @ 13:55  --------------------------------------------------------  IN: 400 mL / OUT: 908 mL / NET: -508 mL    Physical Exam:  	Gen: intubated and on mech vent  	HEENT: MMM, Left temporal surgical scar evident  	Pulm: CTA B/L  	CV: S1S2  	Abd: Soft, +BS   	Ext: No LE edema B/L  	Neuro: Awake  	Skin: Warm and dry  	    LABS/STUDIES  --------------------------------------------------------------------------------              8.3    13.28 >-----------<  212      [11-04-21 @ 05:21]              26.0     141  |  110  |  63  ----------------------------<  180      [11-04-21 @ 11:12]  5.2   |  18  |  2.40        Ca     9.0     [11-04-21 @ 11:12]      Mg     1.80     [11-04-21 @ 11:12]      Phos  3.6     [11-04-21 @ 11:12]    PT/INR: PT 11.4 , INR 0.99       [11-03-21 @ 07:10]  PTT: 36.0       [11-03-21 @ 07:10]    Creatinine Trend:  SCr 2.40 [11-04 @ 11:12]  SCr 2.39 [11-04 @ 05:21]  SCr 2.69 [11-03 @ 07:10]  SCr 3.01 [11-02 @ 07:41]  SCr 2.84 [11-01 @ 07:14]

## 2021-11-04 NOTE — PROGRESS NOTE ADULT - SUBJECTIVE AND OBJECTIVE BOX
HPI: 70yo F w/ pmh inclusive of htn, hcl, DM, CKD, breast ca, being treated outpt by ENT Dr. Gil for left ear malignancy, transferred from [facility] for ENT consult of Left ear infection. Pt was admitted at [facility] on 10/24 after experiencing syncopal episode and after extensive workup there, is now transferred here for ENT mngmt of Left ear malignancy. S/p L temporal bone resection with Dr. Park and Dr. Gil 11/3.    Interval:  intubated overnight, not waking up or following commands well; weaning down sedation, severely acidotic on midnight OR ABG to 7.17    Vital Signs Last 24 Hrs  T(C): 36.6 (04 Nov 2021 04:00), Max: 36.8 (03 Nov 2021 09:36)  T(F): 97.8 (04 Nov 2021 04:00), Max: 98.3 (03 Nov 2021 09:36)  HR: 59 (04 Nov 2021 06:00) (57 - 66)  BP: 143/55 (04 Nov 2021 06:00) (114/49 - 166/64)  BP(mean): 78 (04 Nov 2021 06:00) (65 - 93)  RR: 13 (04 Nov 2021 06:00) (10 - 18)  SpO2: 100% (04 Nov 2021 06:00) (97% - 100%)    Physical Exam:   General: NAD, A+Ox3  Respiratory: No respiratory distress, stridor, or stertor  Voice quality: normal  OU:  EOMI  AD: Pinna wnl, EAC clear, TM intact, no effusion  AS: left supraclav flap in place; skin healing well without blotting; incisions c/d/i  Nose: ulcerating lesion on left skin of nare; s/p biopsy, small clot/scabbing of lesion  OC/OP: tongue normal, floor of mouth WNL, no masses or lesions, OP clear  Neck: soft/flat, no LAD    A/P: 71yFemale w/ pmh inclusive of htn, hcl, DM, CKD, breast ca, and fungating ear mass admitted for preop workup and clearance prior to left temporal bone resection on 11/3 with Dr. Park and Dr. Gil  - sicu consult for severe acidosis  - wean propofol/extubation as per anesthesia  - f/u surgical path  - closely monitor for hyperkalemia  - [facility] hospitalist input/recs  - repletions Mg >2, Phos >3, K >4  - sqh, SCDs       HPI: 72yo F w/ pmh inclusive of htn, hcl, DM, CKD, breast ca, being treated outpt by ENT Dr. Gil for left ear malignancy, transferred from [facility] for ENT consult of Left ear infection. Pt was admitted at [facility] on 10/24 after experiencing syncopal episode and after extensive workup there, is now transferred here for ENT mngmt of Left ear malignancy. S/p L temporal bone resection with Dr. Park and Dr. Gil 11/3.    Interval:  intubated overnight, not waking up or following commands well; weaning down sedation, severely acidotic on midnight OR ABG to 7.17    Vital Signs Last 24 Hrs  T(C): 36.6 (04 Nov 2021 04:00), Max: 36.8 (03 Nov 2021 09:36)  T(F): 97.8 (04 Nov 2021 04:00), Max: 98.3 (03 Nov 2021 09:36)  HR: 59 (04 Nov 2021 06:00) (57 - 66)  BP: 143/55 (04 Nov 2021 06:00) (114/49 - 166/64)  BP(mean): 78 (04 Nov 2021 06:00) (65 - 93)  RR: 13 (04 Nov 2021 06:00) (10 - 18)  SpO2: 100% (04 Nov 2021 06:00) (97% - 100%)    Physical Exam:   General: NAD, A+Ox3  Respiratory: No respiratory distress, stridor, or stertor  Voice quality: normal  OU:  EOMI  AD: Pinna wnl, EAC clear, TM intact, no effusion  AS: left supraclav flap in place; skin healing well without blotting; incisions c/d/i  Nose: ulcerating lesion on left skin of nare; s/p biopsy, small clot/scabbing of lesion  OC/OP: tongue normal, floor of mouth WNL, no masses or lesions, OP clear  Neck: soft/flat, no LAD    A/P: 71yFemale w/ pmh inclusive of htn, hcl, DM, CKD, breast ca, and fungating ear mass admitted for preop workup and clearance prior to left temporal bone resection on 11/3 with Dr. Park and Dr. Gil  - sicu consult for severe acidosis  - wean propofol/extubation as per anesthesia  - f/u surgical path  - closely monitor for hyperkalemia  - [facility] hospitalist input/recs  - cont cefepime  - repletions Mg >2, Phos >3, K >4  - sqh, SCDs

## 2021-11-05 LAB
ANION GAP SERPL CALC-SCNC: 12 MMOL/L — SIGNIFICANT CHANGE UP (ref 7–14)
BUN SERPL-MCNC: 55 MG/DL — HIGH (ref 7–23)
CALCIUM SERPL-MCNC: 8.6 MG/DL — SIGNIFICANT CHANGE UP (ref 8.4–10.5)
CHLORIDE SERPL-SCNC: 108 MMOL/L — HIGH (ref 98–107)
CO2 SERPL-SCNC: 19 MMOL/L — LOW (ref 22–31)
CREAT SERPL-MCNC: 2.17 MG/DL — HIGH (ref 0.5–1.3)
GLUCOSE BLDC GLUCOMTR-MCNC: 165 MG/DL — HIGH (ref 70–99)
GLUCOSE BLDC GLUCOMTR-MCNC: 194 MG/DL — HIGH (ref 70–99)
GLUCOSE BLDC GLUCOMTR-MCNC: 200 MG/DL — HIGH (ref 70–99)
GLUCOSE BLDC GLUCOMTR-MCNC: 210 MG/DL — HIGH (ref 70–99)
GLUCOSE BLDC GLUCOMTR-MCNC: 248 MG/DL — HIGH (ref 70–99)
GLUCOSE SERPL-MCNC: 224 MG/DL — HIGH (ref 70–99)
HCT VFR BLD CALC: 28.6 % — LOW (ref 34.5–45)
HGB BLD-MCNC: 9.1 G/DL — LOW (ref 11.5–15.5)
MAGNESIUM SERPL-MCNC: 1.8 MG/DL — SIGNIFICANT CHANGE UP (ref 1.6–2.6)
MCHC RBC-ENTMCNC: 28.3 PG — SIGNIFICANT CHANGE UP (ref 27–34)
MCHC RBC-ENTMCNC: 31.8 GM/DL — LOW (ref 32–36)
MCV RBC AUTO: 88.8 FL — SIGNIFICANT CHANGE UP (ref 80–100)
NRBC # BLD: 0 /100 WBCS — SIGNIFICANT CHANGE UP
NRBC # FLD: 0 K/UL — SIGNIFICANT CHANGE UP
PHOSPHATE SERPL-MCNC: 3.7 MG/DL — SIGNIFICANT CHANGE UP (ref 2.5–4.5)
PLATELET # BLD AUTO: 234 K/UL — SIGNIFICANT CHANGE UP (ref 150–400)
POTASSIUM SERPL-MCNC: 5.1 MMOL/L — SIGNIFICANT CHANGE UP (ref 3.5–5.3)
POTASSIUM SERPL-SCNC: 5.1 MMOL/L — SIGNIFICANT CHANGE UP (ref 3.5–5.3)
RBC # BLD: 3.22 M/UL — LOW (ref 3.8–5.2)
RBC # FLD: 15.4 % — HIGH (ref 10.3–14.5)
SODIUM SERPL-SCNC: 139 MMOL/L — SIGNIFICANT CHANGE UP (ref 135–145)
SURGICAL PATHOLOGY STUDY: SIGNIFICANT CHANGE UP
WBC # BLD: 15.25 K/UL — HIGH (ref 3.8–10.5)
WBC # FLD AUTO: 15.25 K/UL — HIGH (ref 3.8–10.5)

## 2021-11-05 PROCEDURE — 70450 CT HEAD/BRAIN W/O DYE: CPT | Mod: 26

## 2021-11-05 PROCEDURE — 99232 SBSQ HOSP IP/OBS MODERATE 35: CPT

## 2021-11-05 PROCEDURE — 99233 SBSQ HOSP IP/OBS HIGH 50: CPT

## 2021-11-05 PROCEDURE — 99233 SBSQ HOSP IP/OBS HIGH 50: CPT | Mod: GC

## 2021-11-05 RX ORDER — LABETALOL HCL 100 MG
10 TABLET ORAL ONCE
Refills: 0 | Status: DISCONTINUED | OUTPATIENT
Start: 2021-11-05 | End: 2021-11-05

## 2021-11-05 RX ORDER — HYDRALAZINE HCL 50 MG
10 TABLET ORAL ONCE
Refills: 0 | Status: DISCONTINUED | OUTPATIENT
Start: 2021-11-05 | End: 2021-11-05

## 2021-11-05 RX ORDER — LABETALOL HCL 100 MG
10 TABLET ORAL ONCE
Refills: 0 | Status: COMPLETED | OUTPATIENT
Start: 2021-11-05 | End: 2021-11-05

## 2021-11-05 RX ORDER — HEPARIN SODIUM 5000 [USP'U]/ML
5000 INJECTION INTRAVENOUS; SUBCUTANEOUS EVERY 8 HOURS
Refills: 0 | Status: DISCONTINUED | OUTPATIENT
Start: 2021-11-05 | End: 2021-11-07

## 2021-11-05 RX ORDER — SODIUM CHLORIDE 9 MG/ML
1000 INJECTION, SOLUTION INTRAVENOUS
Refills: 0 | Status: DISCONTINUED | OUTPATIENT
Start: 2021-11-05 | End: 2021-11-05

## 2021-11-05 RX ORDER — MAGNESIUM SULFATE 500 MG/ML
2 VIAL (ML) INJECTION ONCE
Refills: 0 | Status: COMPLETED | OUTPATIENT
Start: 2021-11-05 | End: 2021-11-05

## 2021-11-05 RX ORDER — ONDANSETRON 8 MG/1
4 TABLET, FILM COATED ORAL ONCE
Refills: 0 | Status: COMPLETED | OUTPATIENT
Start: 2021-11-05 | End: 2021-11-05

## 2021-11-05 RX ORDER — LABETALOL HCL 100 MG
10 TABLET ORAL EVERY 6 HOURS
Refills: 0 | Status: DISCONTINUED | OUTPATIENT
Start: 2021-11-05 | End: 2021-11-06

## 2021-11-05 RX ORDER — HYDRALAZINE HCL 50 MG
10 TABLET ORAL EVERY 4 HOURS
Refills: 0 | Status: DISCONTINUED | OUTPATIENT
Start: 2021-11-05 | End: 2021-11-06

## 2021-11-05 RX ORDER — SODIUM CHLORIDE 9 MG/ML
1000 INJECTION, SOLUTION INTRAVENOUS
Refills: 0 | Status: DISCONTINUED | OUTPATIENT
Start: 2021-11-05 | End: 2021-11-06

## 2021-11-05 RX ORDER — HYDROMORPHONE HYDROCHLORIDE 2 MG/ML
0.5 INJECTION INTRAMUSCULAR; INTRAVENOUS; SUBCUTANEOUS ONCE
Refills: 0 | Status: DISCONTINUED | OUTPATIENT
Start: 2021-11-05 | End: 2021-11-06

## 2021-11-05 RX ADMIN — Medication 2: at 11:55

## 2021-11-05 RX ADMIN — SODIUM CHLORIDE 100 MILLILITER(S): 9 INJECTION, SOLUTION INTRAVENOUS at 08:00

## 2021-11-05 RX ADMIN — INSULIN GLARGINE 20 UNIT(S): 100 INJECTION, SOLUTION SUBCUTANEOUS at 23:14

## 2021-11-05 RX ADMIN — Medication 1 APPLICATION(S): at 17:56

## 2021-11-05 RX ADMIN — Medication 10 MILLIGRAM(S): at 17:46

## 2021-11-05 RX ADMIN — Medication 10 MILLIGRAM(S): at 20:30

## 2021-11-05 RX ADMIN — Medication 10 MILLIGRAM(S): at 20:47

## 2021-11-05 RX ADMIN — Medication 50 GRAM(S): at 03:40

## 2021-11-05 RX ADMIN — ONDANSETRON 4 MILLIGRAM(S): 8 TABLET, FILM COATED ORAL at 22:35

## 2021-11-05 RX ADMIN — Medication 1000 MILLIGRAM(S): at 08:30

## 2021-11-05 RX ADMIN — Medication 10 MILLIGRAM(S): at 15:00

## 2021-11-05 RX ADMIN — Medication 10 MILLIGRAM(S): at 02:17

## 2021-11-05 RX ADMIN — Medication 10 MILLIGRAM(S): at 11:43

## 2021-11-05 RX ADMIN — Medication 60 MICROGRAM(S): at 23:15

## 2021-11-05 RX ADMIN — CEFEPIME 100 MILLIGRAM(S): 1 INJECTION, POWDER, FOR SOLUTION INTRAMUSCULAR; INTRAVENOUS at 05:34

## 2021-11-05 RX ADMIN — Medication 400 MILLIGRAM(S): at 08:00

## 2021-11-05 RX ADMIN — Medication 1 APPLICATION(S): at 05:35

## 2021-11-05 RX ADMIN — Medication 1: at 00:33

## 2021-11-05 RX ADMIN — HEPARIN SODIUM 5000 UNIT(S): 5000 INJECTION INTRAVENOUS; SUBCUTANEOUS at 05:34

## 2021-11-05 RX ADMIN — Medication 10 MILLIGRAM(S): at 15:45

## 2021-11-05 RX ADMIN — HEPARIN SODIUM 5000 UNIT(S): 5000 INJECTION INTRAVENOUS; SUBCUTANEOUS at 23:14

## 2021-11-05 RX ADMIN — Medication 10 MILLIGRAM(S): at 08:02

## 2021-11-05 RX ADMIN — Medication 2: at 05:36

## 2021-11-05 RX ADMIN — Medication 1000 MILLIGRAM(S): at 00:01

## 2021-11-05 RX ADMIN — SODIUM CHLORIDE 100 MILLILITER(S): 9 INJECTION, SOLUTION INTRAVENOUS at 03:40

## 2021-11-05 RX ADMIN — Medication 1: at 17:46

## 2021-11-05 RX ADMIN — HEPARIN SODIUM 5000 UNIT(S): 5000 INJECTION INTRAVENOUS; SUBCUTANEOUS at 14:57

## 2021-11-05 RX ADMIN — SODIUM CHLORIDE 100 MILLILITER(S): 9 INJECTION, SOLUTION INTRAVENOUS at 03:08

## 2021-11-05 NOTE — PROGRESS NOTE ADULT - SUBJECTIVE AND OBJECTIVE BOX
HISTORY  72yo F w/ pmh inclusive of htn, hcl, DM, CKD, breast ca, being treated outpt by ENT Dr. Gil for left ear malignancy, transferred from [facility] for ENT consult of Left ear infection. Pt was admitted at [facility] on 10/24 after experiencing syncopal episode and after extensive workup there, is now transferred here for ENT mngmt of Left ear malignancy as pt's ENT is out of [facility]. Pt reporting increased "burning" pain of left ear w/ increased foul smelling drainage from left ear lesion. No recent fever/chills, No photophobia/eye pain/changes in vision, no sore throat/dysphagia, No chest pain/palpitations, no SOB/cough/wheeze/stridor, No abdominal pain, No N/V/D, no dysuria/frequency/discharge, No neck/back pain, no rash, no new focal neuro symptoms.     24 HOUR EVENTS:  Self Extubated    NEURO  RASS:   0  GCS:   14  CAM ICU: Negative  Exam: AOx1, Not following commands, nods yes or no to questions  Meds: acetaminophen   IVPB .. 1000 milliGRAM(s) IV Intermittent every 6 hours PRN Temp greater or equal to 38C (100.4F), Mild Pain (1 - 3)    [x] Adequacy of sedation and pain control has been assessed and adjusted      RESPIRATORY  RR: 17 (11-05-21 @ 02:14) (10 - 18)  SpO2: 98% (11-05-21 @ 02:14) (94% - 100%)  Wt(kg): --  Exam: Satting well on rm air, b/l chest rise, no increased work of breathing  Mechanical Ventilation: Mode: CPAP with PS, RR (patient): 19, FiO2: 40, PEEP: 5, PS: 5, MAP: 8  ABG - ( 04 Nov 2021 20:41 )  pH: 7.31  /  pCO2: 41    /  pO2: 176   / HCO3: 21    / Base Excess: -5.3  /  SaO2: 99.5    Lactate: x                [ ] Extubation Readiness Assessed  Meds:       CARDIOVASCULAR  HR: 93 (11-05-21 @ 02:14) (59 - 94)  BP: 145/84 (11-04-21 @ 09:30) (104/54 - 166/64)  BP(mean): 90 (11-04-21 @ 09:30) (66 - 91)  ABP: 174/65 (11-05-21 @ 02:14) (141/50 - 182/69)  ABP(mean): 110 (11-05-21 @ 02:14) (86 - 118)  Wt(kg): --  CVP(cm H2O): --      Exam: RRR, Appears well perfused  Perfusion     [x ]Adequate   [ ]Inadequate  Mentation   [ ]Normal       [x ]Reduced  Extremities  [x ]Warm         [ ]Cool  Volume Status [ ]Hypervolemic [x ]Euvolemic [ ]Hypovolemic  Meds: hydrALAZINE Injectable 10 milliGRAM(s) IV Push every 4 hours PRN SBP>175        GI/NUTRITION  Exam: Soft, NT, ND  Diet: NPO  Meds:     GENITOURINARY  I&O's Detail    11-03 @ 07:01  -  11-04 @ 07:00  --------------------------------------------------------  IN:    Lactated Ringers: 300 mL    Propofol: 90 mL  Total IN: 390 mL    OUT:    Bulb (mL): 63 mL    Bulb (mL): 19 mL    Indwelling Catheter - Urethral (mL): 495 mL  Total OUT: 577 mL    Total NET: -187 mL      11-04 @ 07:01  -  11-05 @ 04:55  --------------------------------------------------------  IN:    dextrose 5% + sodium chloride 0.45%: 1400 mL  Total IN: 1400 mL    OUT:    Bulb (mL): 43 mL    Bulb (mL): 100 mL    Indwelling Catheter - Urethral (mL): 2495 mL  Total OUT: 2638 mL    Total NET: -1238 mL          11-05    139  |  108<H>  |  55<H>  ----------------------------<  224<H>  5.1   |  19<L>  |  2.17<H>    Ca    8.6      05 Nov 2021 01:46  Phos  3.7     11-05  Mg     1.80     11-05      [ ] [place] catheter, indication:   Meds: dextrose 5%. 1000 milliLiter(s) IV Continuous <Continuous>  dextrose 5%. 1000 milliLiter(s) IV Continuous <Continuous>  lactated ringers. 1000 milliLiter(s) IV Continuous <Continuous>  sodium bicarbonate 325 milliGRAM(s) Oral two times a day        HEMATOLOGIC  Meds: heparin   Injectable 5000 Unit(s) SubCutaneous every 12 hours    [x] VTE Prophylaxis                        9.1    15.25 )-----------( 234      ( 05 Nov 2021 01:46 )             28.6     PT/INR - ( 03 Nov 2021 07:10 )   PT: 11.4 sec;   INR: 0.99 ratio         PTT - ( 03 Nov 2021 07:10 )  PTT:36.0 sec  Transfusion     [ ] PRBC   [ ] Platelets   [ ] FFP   [ ] Cryoprecipitate      INFECTIOUS DISEASES  T(C): 36.8 (11-05-21 @ 00:00), Max: 36.8 (11-05-21 @ 00:00)  Wt(kg): --  WBC Count: 15.25 K/uL (11-05 @ 01:46)  WBC Count: 13.28 K/uL (11-04 @ 05:21)    Recent Cultures:  Specimen Source: .Other Left ear discharge., 10-29 @ 08:30; Results   Moderate Proteus mirabilis  Few Pseudomonas aeruginosa  Numerous Corynebacterium species "Susceptibilities not performed"; Gram Stain: --; Organism: Proteus mirabilis  Pseudomonas aeruginosa    Meds: cefepime   IVPB 1000 milliGRAM(s) IV Intermittent every 12 hours  influenza  Vaccine (HIGH DOSE) 0.7 milliLiter(s) IntraMuscular once        ENDOCRINE  Capillary Blood Glucose    Meds: atorvastatin 40 milliGRAM(s) Oral at bedtime  dextrose 40% Gel 15 Gram(s) Oral once  dextrose 50% Injectable 25 Gram(s) IV Push once  dextrose 50% Injectable 12.5 Gram(s) IV Push once  dextrose 50% Injectable 25 Gram(s) IV Push once  glucagon  Injectable 1 milliGRAM(s) IntraMuscular once  insulin glargine Injectable (LANTUS) 20 Unit(s) SubCutaneous at bedtime  insulin lispro (ADMELOG) corrective regimen sliding scale   SubCutaneous every 6 hours  levothyroxine Injectable 60 MICROGram(s) IV Push at bedtime        ACCESS DEVICES:  [x] Peripheral IV  [ ] Central Venous Line	[ ] R	[ ] L	[ ] IJ	[ ] Fem	[ ] SC	Placed:   [ ] Arterial Line		[ ] R	[ ] L	[ ] Fem	[ ] Rad	[ ] Ax	Placed:   [ ] PICC:					[ ] Mediport  [ ] Urinary Catheter, Date Placed:   [x] Necessity of urinary, arterial, and venous catheters discussed    OTHER MEDICATIONS:  BACItracin   Ointment 1 Application(s) Topical two times a day       HISTORY  70yo F w/ pmh inclusive of htn, hcl, DM, CKD, breast ca, being treated outpt by ENT Dr. Gil for left ear malignancy, transferred from [facility] for ENT consult of Left ear infection. Pt was admitted at [facility] on 10/24 after experiencing syncopal episode and after extensive workup there, is now transferred here for ENT mngmt of Left ear malignancy as pt's ENT is out of [facility]. Pt reporting increased "burning" pain of left ear w/ increased foul smelling drainage from left ear lesion. No recent fever/chills, No photophobia/eye pain/changes in vision, no sore throat/dysphagia, No chest pain/palpitations, no SOB/cough/wheeze/stridor, No abdominal pain, No N/V/D, no dysuria/frequency/discharge, No neck/back pain, no rash, no new focal neuro symptoms.     24 HOUR EVENTS:  Self Extubated  fluctuating mental status, will obtain CTH  NEURO  RASS:   0  GCS:   14  CAM ICU: Negative  Exam: AOx1, Not following commands, nods yes or no to questions  Meds: acetaminophen   IVPB .. 1000 milliGRAM(s) IV Intermittent every 6 hours PRN Temp greater or equal to 38C (100.4F), Mild Pain (1 - 3)    [x] Adequacy of sedation and pain control has been assessed and adjusted      RESPIRATORY  RR: 17 (11-05-21 @ 02:14) (10 - 18)  SpO2: 98% (11-05-21 @ 02:14) (94% - 100%)  Wt(kg): --  Exam: Satting well on rm air, b/l chest rise, no increased work of breathing  Mechanical Ventilation: Mode: CPAP with PS, RR (patient): 19, FiO2: 40, PEEP: 5, PS: 5, MAP: 8  ABG - ( 04 Nov 2021 20:41 )  pH: 7.31  /  pCO2: 41    /  pO2: 176   / HCO3: 21    / Base Excess: -5.3  /  SaO2: 99.5    Lactate: x                [ ] Extubation Readiness Assessed  Meds:       CARDIOVASCULAR  HR: 93 (11-05-21 @ 02:14) (59 - 94)  BP: 145/84 (11-04-21 @ 09:30) (104/54 - 166/64)  BP(mean): 90 (11-04-21 @ 09:30) (66 - 91)  ABP: 174/65 (11-05-21 @ 02:14) (141/50 - 182/69)  ABP(mean): 110 (11-05-21 @ 02:14) (86 - 118)  Wt(kg): --  CVP(cm H2O): --      Exam: RRR, Appears well perfused  Perfusion     [x ]Adequate   [ ]Inadequate  Mentation   [ ]Normal       [x ]Reduced  Extremities  [x ]Warm         [ ]Cool  Volume Status [ ]Hypervolemic [x ]Euvolemic [ ]Hypovolemic  Meds: hydrALAZINE Injectable 10 milliGRAM(s) IV Push every 4 hours PRN SBP>175        GI/NUTRITION  Exam: Soft, NT, ND  Diet: NPO  Meds:     GENITOURINARY  I&O's Detail    11-03 @ 07:01  -  11-04 @ 07:00  --------------------------------------------------------  IN:    Lactated Ringers: 300 mL    Propofol: 90 mL  Total IN: 390 mL    OUT:    Bulb (mL): 63 mL    Bulb (mL): 19 mL    Indwelling Catheter - Urethral (mL): 495 mL  Total OUT: 577 mL    Total NET: -187 mL      11-04 @ 07:01  -  11-05 @ 04:55  --------------------------------------------------------  IN:    dextrose 5% + sodium chloride 0.45%: 1400 mL  Total IN: 1400 mL    OUT:    Bulb (mL): 43 mL    Bulb (mL): 100 mL    Indwelling Catheter - Urethral (mL): 2495 mL  Total OUT: 2638 mL    Total NET: -1238 mL          11-05    139  |  108<H>  |  55<H>  ----------------------------<  224<H>  5.1   |  19<L>  |  2.17<H>    Ca    8.6      05 Nov 2021 01:46  Phos  3.7     11-05  Mg     1.80     11-05      [ ] [place] catheter, indication:   Meds: dextrose 5%. 1000 milliLiter(s) IV Continuous <Continuous>  dextrose 5%. 1000 milliLiter(s) IV Continuous <Continuous>  lactated ringers. 1000 milliLiter(s) IV Continuous <Continuous>  sodium bicarbonate 325 milliGRAM(s) Oral two times a day        HEMATOLOGIC  Meds: heparin   Injectable 5000 Unit(s) SubCutaneous every 12 hours    [x] VTE Prophylaxis                        9.1    15.25 )-----------( 234      ( 05 Nov 2021 01:46 )             28.6     PT/INR - ( 03 Nov 2021 07:10 )   PT: 11.4 sec;   INR: 0.99 ratio         PTT - ( 03 Nov 2021 07:10 )  PTT:36.0 sec  Transfusion     [ ] PRBC   [ ] Platelets   [ ] FFP   [ ] Cryoprecipitate      INFECTIOUS DISEASES  T(C): 36.8 (11-05-21 @ 00:00), Max: 36.8 (11-05-21 @ 00:00)  Wt(kg): --  WBC Count: 15.25 K/uL (11-05 @ 01:46)  WBC Count: 13.28 K/uL (11-04 @ 05:21)    Recent Cultures:  Specimen Source: .Other Left ear discharge., 10-29 @ 08:30; Results   Moderate Proteus mirabilis  Few Pseudomonas aeruginosa  Numerous Corynebacterium species "Susceptibilities not performed"; Gram Stain: --; Organism: Proteus mirabilis  Pseudomonas aeruginosa    Meds: cefepime   IVPB 1000 milliGRAM(s) IV Intermittent every 12 hours  influenza  Vaccine (HIGH DOSE) 0.7 milliLiter(s) IntraMuscular once        ENDOCRINE  Capillary Blood Glucose    Meds: atorvastatin 40 milliGRAM(s) Oral at bedtime  dextrose 40% Gel 15 Gram(s) Oral once  dextrose 50% Injectable 25 Gram(s) IV Push once  dextrose 50% Injectable 12.5 Gram(s) IV Push once  dextrose 50% Injectable 25 Gram(s) IV Push once  glucagon  Injectable 1 milliGRAM(s) IntraMuscular once  insulin glargine Injectable (LANTUS) 20 Unit(s) SubCutaneous at bedtime  insulin lispro (ADMELOG) corrective regimen sliding scale   SubCutaneous every 6 hours  levothyroxine Injectable 60 MICROGram(s) IV Push at bedtime        ACCESS DEVICES:  [x] Peripheral IV  [ ] Central Venous Line	[ ] R	[ ] L	[ ] IJ	[ ] Fem	[ ] SC	Placed:   [ ] Arterial Line		[ ] R	[ ] L	[ ] Fem	[ ] Rad	[ ] Ax	Placed:   [ ] PICC:					[ ] Mediport  [ ] Urinary Catheter, Date Placed:   [x] Necessity of urinary, arterial, and venous catheters discussed    OTHER MEDICATIONS:  BACItracin   Ointment 1 Application(s) Topical two times a day

## 2021-11-05 NOTE — PROGRESS NOTE ADULT - PROBLEM SELECTOR PLAN 2
Pt. with metabolic acidosis in setting of CKD. Started on oral sodium bicarbonate 325 mg PO BID. serum sodium bicarb is at 19 today. Monitor serum CO2.    If you have any questions, please feel free to contact me  Kimani Neff  Nephrology Fellow  [phone]  (After 5pm or on weekends please page the on-call fellow)

## 2021-11-05 NOTE — PHYSICAL THERAPY INITIAL EVALUATION ADULT - ADDITIONAL COMMENTS
Patient lives with her  in apartment on first floor, no steps to negotiate. Patient was using cane and walker prior to admission. Patient was independent in all ADL prior to admission.

## 2021-11-05 NOTE — PHYSICAL THERAPY INITIAL EVALUATION ADULT - DID THE PATIENT HAVE SURGERY?
Left auriculectomy, Lateral temporal bone resection, Left selective neck dissection, Parotidectomy/yes

## 2021-11-05 NOTE — PROGRESS NOTE ADULT - SUBJECTIVE AND OBJECTIVE BOX
[facility] DIVISION OF KIDNEY DISEASES AND HYPERTENSION -- FOLLOW UP NOTE  --------------------------------------------------------------------------------  HPI: 71-year-old female with longstanding history of DM (>40 years), HTN, breast cancer, left ear malignancy and CKD was initially admitted to [facility] on 10/24/21 for L ear infection, then transferred to [facility] on 10/29/21 for further ENT management. Planned for lateral temporal bone resection by ENT team. Nephrology consulted for CKD management. [facility], patient noted to have elevated Scr of 1.57 on 9/24/15. Scr progressively increased over the years and was 2.17 on 9/16/19. Scr was 2.63 on 10/29/21 and it increased to 3.01 on 11/2/21. Scr is at 2.69 today. Pt. has been aware of her kidney disease and saw Dr. Dk Rose few months ago as outpatient in [place], [place]. Pt. underwent L auriculectomy, left lateral temporal bone resection, with parotidectomy on 11/3/21. Pt. was transferred to SICU intubated on 11/4/21. Pt. self extubated later in the evening.     Pt. seen and examined today. Pt. reports pain at the incision site. Denies SOB, CP, HA, N/V, abdominal pain, or urinary symptoms or dizziness.     PAST HISTORY  --------------------------------------------------------------------------------  No significant changes to PMH, PSH, FHx, SHx, unless otherwise noted    ALLERGIES & MEDICATIONS  --------------------------------------------------------------------------------  Allergies    No Known Allergies    Intolerances    Standing Inpatient Medications  atorvastatin 40 milliGRAM(s) Oral at bedtime  BACItracin   Ointment 1 Application(s) Topical two times a day  cefepime   IVPB 1000 milliGRAM(s) IV Intermittent every 12 hours  dextrose 40% Gel 15 Gram(s) Oral once  dextrose 5%. 1000 milliLiter(s) IV Continuous <Continuous>  dextrose 5%. 1000 milliLiter(s) IV Continuous <Continuous>  dextrose 50% Injectable 25 Gram(s) IV Push once  dextrose 50% Injectable 12.5 Gram(s) IV Push once  dextrose 50% Injectable 25 Gram(s) IV Push once  glucagon  Injectable 1 milliGRAM(s) IntraMuscular once  heparin   Injectable 5000 Unit(s) SubCutaneous every 12 hours  influenza  Vaccine (HIGH DOSE) 0.7 milliLiter(s) IntraMuscular once  insulin glargine Injectable (LANTUS) 20 Unit(s) SubCutaneous at bedtime  insulin lispro (ADMELOG) corrective regimen sliding scale   SubCutaneous every 6 hours  lactated ringers. 1000 milliLiter(s) IV Continuous <Continuous>  levothyroxine Injectable 60 MICROGram(s) IV Push at bedtime  sodium bicarbonate 325 milliGRAM(s) Oral two times a day    PRN Inpatient Medications  acetaminophen   IVPB .. 1000 milliGRAM(s) IV Intermittent every 6 hours PRN  hydrALAZINE Injectable 10 milliGRAM(s) IV Push every 4 hours PRN    REVIEW OF SYSTEMS  --------------------------------------------------------------------------------  Gen: No fevers , pain at the incision site present  Respiratory: No dyspnea  CV: No chest pain  GI: No abdominal pain  : No dysuria  MSK: No  edema  Neuro: no dizziness     All other systems were reviewed and are negative, except as noted.    VITALS/PHYSICAL EXAM  --------------------------------------------------------------------------------  T(C): 36.7 (11-05-21 @ 08:00), Max: 36.9 (11-05-21 @ 04:00)  HR: 92 (11-05-21 @ 08:05) (65 - 97)  BP: 161/61 (11-05-21 @ 08:00) (134/60 - 161/61)  RR: 14 (11-05-21 @ 08:05) (7 - 19)  SpO2: 95% (11-05-21 @ 08:05) (94% - 100%)  Wt(kg): --    11-04-21 @ 07:01  -  11-05-21 @ 07:00  --------------------------------------------------------  IN: 2100 mL / OUT: 3493 mL / NET: -1393 mL    11-05-21 @ 07:01  -  11-05-21 @ 09:03  --------------------------------------------------------  IN: 200 mL / OUT: 300 mL / NET: -100 mL    Physical Exam:  	Gen: NAD, appears lethargic  	HEENT: MMM, Left temporal and neck scar evident  	Pulm: CTA B/L, no crackles  	CV: S1S2  	Abd: Soft, +BS   	Ext: No LE edema B/L  	Neuro: Awake  	Skin: Warm and dry  	Vascular access:    LABS/STUDIES  --------------------------------------------------------------------------------              9.1    15.25 >-----------<  234      [11-05-21 @ 01:46]              28.6     139  |  108  |  55  ----------------------------<  224      [11-05-21 @ 01:46]  5.1   |  19  |  2.17        Ca     8.6     [11-05-21 @ 01:46]      Mg     1.80     [11-05-21 @ 01:46]      Phos  3.7     [11-05-21 @ 01:46]    Creatinine Trend:  SCr 2.17 [11-05 @ 01:46]  SCr 2.40 [11-04 @ 11:12]  SCr 2.39 [11-04 @ 05:21]  SCr 2.69 [11-03 @ 07:10]  SCr 3.01 [11-02 @ 07:41]

## 2021-11-05 NOTE — PHYSICAL THERAPY INITIAL EVALUATION ADULT - PERTINENT HX OF CURRENT PROBLEM, REHAB EVAL
Patient presents with left ear malignancy, transferred from [facility] for ENT consult of Left ear infection. Pt was admitted at [facility] on 10/24 after experiencing syncopal episode and after extensive workup there, is now transferred here for ENT mngmt of Left ear malignancy as pt's ENT is out of [facility]. Pt reporting increased "burning" pain of left ear w/ increased foul smelling drainage from left ear lesion. No recent fever/chills,

## 2021-11-05 NOTE — PROGRESS NOTE ADULT - PROBLEM SELECTOR PLAN 1
Pt. with advanced CKD stage 4 in setting of longstanding DM and HTN. On review of labs on [facility], patient noted to have elevated Scr of 1.57 on 9/24/15. Scr progressively increased over the years and was 2.17 on 9/16/19. on admission Scr was 2.45 on 10/29/21. Scr elevated/stable at 3.01 on 11/2/21. Scr was at 2.69 on 11/3/21. Scr is at 2.17 today. Pt. underwent Left temporal resection on 11/3/21. Currently on LR @ 75 cc/hr . Agree with IVf for now. Monitor labs and urine output. Avoid any potential nephrotoxins. Discontinue ACE-I. Dose meds as per eGFR.

## 2021-11-05 NOTE — PHYSICAL THERAPY INITIAL EVALUATION ADULT - FOLLOWS COMMANDS/ANSWERS QUESTIONS, REHAB EVAL
patient requiring extra time to process commands but able to follow 100% of the time./100% of the time

## 2021-11-05 NOTE — PROGRESS NOTE ADULT - ASSESSMENT
70yo F w/ pmh inclusive of htn, hcl, DM, CKD, breast ca, being treated outpt by ENT Dr. Gil for left ear malignancy, transferred from [facility] for ENT consult of Left ear infection. Pt was admitted at [facility] on 10/24 after experiencing syncopal episode and after extensive workup there, is now transferred here for ENT mngmt of Left ear malignancy s/p L auriculectomy, left lateral temporal bone resection, with parotidectomy, and L SND 2-5 removed en bloc. Reconstructed with L supraclavicular flap 11/4.      PLAN:     Neurologic:   - AOx1, not following commands  - IV tylenol PRN Q6 for pain  - Off sedation    Respiratory:   - Self extubated  - Satting well on rm air  - No active issues    Cardiovascular:   - hypertension to 150s-160's  - takes hydralizine 50mg TID at home  - IV hydralizine PRN for SBP >175 10mg Q4   - c/w home atorvastatin  - Flap checks as per ENT    Gastrointestinal/Nutrition:   - NPO    Renal/Genitourinary:   - acidemia  - K 5.1 on labs, monitor for worsening hyperkalemia  - LR @ 100/hr    Hematologic:   - VTE prophylaxis: SubQ heparin Q12h    Infectious Disease:   - c/w cefepime  - monitor for fevers  - bacitracin ointment on ear    Lines/Tubes:  - ETT  - PIV  - a-line  - baptiste catheter    Endocrine:   - ISS  - resume pre-meals after tolerating PO  - hypothyroidism, c/w levothyroxine 60mg at bedtime    Disposition: SICU    --------------------------------------------------------------------------------------    Critical Care Diagnoses:  - severe acidemia  - Flap checks    72yo F w/ pmh inclusive of htn, hcl, DM, CKD, breast ca, being treated outpt by ENT Dr. Gil for left ear malignancy, transferred from [facility] for ENT consult of Left ear infection. Pt was admitted at [facility] on 10/24 after experiencing syncopal episode and after extensive workup there, is now transferred here for ENT mngmt of Left ear malignancy s/p L auriculectomy, left lateral temporal bone resection, with parotidectomy, and L SND 2-5 removed en bloc. Reconstructed with L supraclavicular flap 11/4.      PLAN:     Neurologic:   - fluctuating mental status, following commands  - IV tylenol PRN Q6 for pain  - Off sedation  - CT Head    Respiratory:   - Self extubated  - Satting well on rm air  - No active issues    Cardiovascular:   - hypertension to 150s-160's  - takes home hydralizine 50mg TID  - Start standing IV hydralazine   - c/w home atorvastatin  - Flap checks as per ENT    Gastrointestinal/Nutrition:   - NPO    Renal/Genitourinary:   - acidemia  - K 5.1 on labs, monitor for worsening hyperkalemia  - LR @ 50/hr    Hematologic:   - VTE prophylaxis: SubQ heparin Q8h    Infectious Disease:   - c/w cefepime  - monitor for fevers  - bacitracin ointment on incision    Lines/Tubes:  - ETT  - PIV  - a-line  - baptiste catheter    Endocrine:   - ISS  - resume pre-meals after tolerating PO  - hypothyroidism, c/w levothyroxine 60mg at bedtime    Disposition: SICU    --------------------------------------------------------------------------------------    Critical Care Diagnoses:  - Flap checks    72yo F w/ pmh inclusive of htn, hcl, DM, CKD, breast ca, being treated outpt by ENT Dr. Gil for left ear malignancy, transferred from [facility] for ENT consult of Left ear infection. Pt was admitted at [facility] on 10/24 after experiencing syncopal episode and after extensive workup there, is now transferred here for ENT mngmt of Left ear malignancy s/p L auriculectomy, left lateral temporal bone resection, with parotidectomy, and L SND 2-5 removed en bloc. Reconstructed with L supraclavicular flap 11/4.      PLAN:      Neurologic:   - AOOx3; waxes and wanes    - IV tylenol PRN Q6 for pain  - Off sedation  - CTH ordered 11/5; will f/u    Respiratory:   - Self extubated  - Satting well on rm air  - No active issues    Cardiovascular:   - hypertension to 150s-160's  - takes hydralizine 50mg TID at home  - IV hydralizine standing for SBP >175 10mg Q4   - 10 of labetalol given  - c/w home atorvastatin  - Flap checks as per ENT    Gastrointestinal/Nutrition:   - NPO    Renal/Genitourinary:   - acidemia  - K 5.1 on labs, monitor for worsening hyperkalemia  - LR @ 50/hr    Hematologic:   - VTE prophylaxis: SubQ heparin Q8h    Infectious Disease:   - c/w cefepime  - monitor for fevers  - bacitracin ointment on ear    Lines/Tubes:  - PIV  - a-line  - baptiste catheter    Endocrine:   - ISS  - resume pre-meals after tolerating PO  - hypothyroidism, c/w levothyroxine 60mg at bedtime    Disposition: SICU    Critical Care Diagnoses:  - severe acidemia  - Flap checks

## 2021-11-05 NOTE — PROGRESS NOTE ADULT - SUBJECTIVE AND OBJECTIVE BOX
HPI: 72yo F w/ pmh inclusive of htn, hcl, DM, CKD, breast ca, being treated outpt by ENT Dr. Gil for left ear malignancy, transferred from [facility] for ENT consult of Left ear infection. Pt was admitted at [facility] on 10/24 after experiencing syncopal episode and after extensive workup there, is now transferred here for ENT mngmt of Left ear malignancy. S/p L temporal bone resection with Dr. Park and Dr. Gil 11/3.    patient self extubated  remains extubated in SICU      Vital Signs Last 24 Hrs  T(C): 36.8 (05 Nov 2021 00:00), Max: 36.8 (05 Nov 2021 00:00)  T(F): 98.3 (05 Nov 2021 00:00), Max: 98.3 (05 Nov 2021 00:00)  HR: 93 (05 Nov 2021 02:14) (58 - 94)  BP: 145/84 (04 Nov 2021 09:30) (104/54 - 166/64)  BP(mean): 90 (04 Nov 2021 09:30) (66 - 91)  RR: 17 (05 Nov 2021 02:14) (10 - 18)  SpO2: 98% (05 Nov 2021 02:14) (94% - 100%)  Physical Exam:   General: NAD  Respiratory: No respiratory distress, stridor, or stertor  OU:  EOMI  AD: Pinna wnl, EAC clear, TM intact, no effusion  AS: left supraclav flap in place; skin healing well without blotting; incisions c/d/i  Nose: ulcerating lesion on left skin of nare; s/p biopsy, small clot/scabbing of lesion  OC/OP: tongue normal, floor of mouth WNL, no masses or lesions, OP clear  Neck: soft/flat, no LAD    A/P: 71yFemale w/ pmh inclusive of htn, hcl, DM, CKD, breast ca, and fungating ear mass admitted for preop workup and clearance prior to left temporal bone resection on 11/3 with Dr. Park and Dr. Gil  - f/u surgical path  - closely monitor for hyperkalemia and acidosis  - monitor mental status  - cont cefepime  -electrolyte repletions/management  - sqh, SCDs

## 2021-11-05 NOTE — PROGRESS NOTE ADULT - ASSESSMENT
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 Pt was admitted at [facility] on 10/24 after experiencing syncopal episode and after extensive workup there, is now transferred here for further management. Pt reporting increased "burning" pain of left ear w/ increased foul smelling drainage from left ear lesion since last few months. Wound cultures from [facility] noted, (collected 10/25), with proteus mirabilis and pseudomonas aeruginosa.    Wound cultures from [facility] noted, (collected 10/25), with proteus mirabilis and pseudomonas aeruginosa.   From [facility]: Wound cx from 8/30/21: Pan sensitive pseudomonas    IMPRESSION  Fungating Left Ear SCC with concern for superinfection   Pseudomonas and proteus infection, polymicrobial infection.   s/p surgery   leucocytosis, likely reactive post procedure.   s/p sx.       RECOMMENDATION  - Pt currently on cefepime -> Agree with coverage. Continue for now, decreased dose to 1 gm q12h   -   - Blood cx if pt spikes fevers.         Tan Mariano  Pager: [phone]. If no response or past 5 pm call [phone].    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 Pt was admitted at [facility] on 10/24 after experiencing syncopal episode and after extensive workup there, is now transferred here for further management. Pt reporting increased "burning" pain of left ear w/ increased foul smelling drainage from left ear lesion since last few months. Wound cultures from [facility] noted, (collected 10/25), with proteus mirabilis and pseudomonas aeruginosa.    Wound cultures from [facility] noted, (collected 10/25), with proteus mirabilis and pseudomonas aeruginosa.   From [facility]: Wound cx from 8/30/21: Pan sensitive pseudomonas    IMPRESSION  Fungating Left Ear SCC with concern for superinfection   Pseudomonas and proteus infection, polymicrobial infection.   s/p surgery   leucocytosis, likely reactive post procedure.   s/p sx.       RECOMMENDATION  - Pt currently on cefepime, but given the source of infection is resected, consider stopping abx unless concern for deeper infection when pt went to OR  - trend cbc for leucocytosis, ? reactive, post op    - Blood cx if pt spikes fever  - AMS, ? cefepime induced, consider stopping if no other explanation.     Plan discussed with VICKEY Mariano  Pager: [phone]. If no response or past 5 pm call [phone].

## 2021-11-05 NOTE — PROGRESS NOTE ADULT - SUBJECTIVE AND OBJECTIVE BOX
71y old  Female who presents with a chief complaint of ear mass, pre op (04 Nov 2021 12:16)      Interval history:  Afebrile, pain in neck.     Allergies:   No Known Allergies      Antimicrobials:      REVIEW OF SYSTEMS:  No chest pain   No SOB   No rash.       Vital Signs Last 24 Hrs  T(C): 36.7 (11-05-21 @ 12:00), Max: 36.9 (11-05-21 @ 04:00)  T(F): 98.1 (11-05-21 @ 12:00), Max: 98.4 (11-05-21 @ 04:00)  HR: 87 (11-05-21 @ 12:00) (74 - 97)  BP: 161/61 (11-05-21 @ 08:00) (161/61 - 161/61)  BP(mean): 87 (11-05-21 @ 08:00) (87 - 87)  RR: 15 (11-05-21 @ 12:00) (7 - 19)  SpO2: 97% (11-05-21 @ 12:00) (94% - 100%)      PHYSICAL EXAM:  Patient in no acute distress. Alert, awake.   lt neck and shoulder sutures c/d/i  Cardiovascular: S1S2 normal.  Lungs: + air entry B/L lung fields.  Gastrointestinal: soft, nontender, nondistended.  Extremities: no edema.  IV sites not inflamed.                             9.1    15.25 )-----------( 234      ( 05 Nov 2021 01:46 )             28.6   11-05    139  |  108<H>  |  55<H>  ----------------------------<  224<H>  5.1   |  19<L>  |  2.17<H>    Ca    8.6      05 Nov 2021 01:46  Phos  3.7     11-05  Mg     1.80     11-05          Radiology:  < from: Xray Chest 1 View- PORTABLE-Urgent (Xray Chest 1 View- PORTABLE-Urgent .) (11.04.21 @ 17:10) >  IMPRESSION:    Clear lungs.         71y old  Female who presents with a chief complaint of ear mass, pre op (04 Nov 2021 12:16)      Interval history:  Afebrile, pain in neck.     Allergies:   No Known Allergies      Antimicrobials:      REVIEW OF SYSTEMS:  No chest pain   No SOB   No rash.       Vital Signs Last 24 Hrs  T(C): 36.7 (11-05-21 @ 12:00), Max: 36.9 (11-05-21 @ 04:00)  T(F): 98.1 (11-05-21 @ 12:00), Max: 98.4 (11-05-21 @ 04:00)  HR: 87 (11-05-21 @ 12:00) (74 - 97)  BP: 161/61 (11-05-21 @ 08:00) (161/61 - 161/61)  BP(mean): 87 (11-05-21 @ 08:00) (87 - 87)  RR: 15 (11-05-21 @ 12:00) (7 - 19)  SpO2: 97% (11-05-21 @ 12:00) (94% - 100%)      PHYSICAL EXAM:  Patient in no acute distress. Alert, awake.   lt neck and shoulder sutures c/d/i  Cardiovascular: S1S2 normal.  Lungs: + air entry B/L lung fields.  Gastrointestinal: soft, nontender, nondistended.  Extremities: no edema.  IV sites not inflamed.   + emile                           9.1    15.25 )-----------( 234      ( 05 Nov 2021 01:46 )             28.6   11-05    139  |  108<H>  |  55<H>  ----------------------------<  224<H>  5.1   |  19<L>  |  2.17<H>    Ca    8.6      05 Nov 2021 01:46  Phos  3.7     11-05  Mg     1.80     11-05          Radiology:  < from: Xray Chest 1 View- PORTABLE-Urgent (Xray Chest 1 View- PORTABLE-Urgent .) (11.04.21 @ 17:10) >  IMPRESSION:    Clear lungs.

## 2021-11-06 LAB
ANION GAP SERPL CALC-SCNC: 10 MMOL/L — SIGNIFICANT CHANGE UP (ref 7–14)
BUN SERPL-MCNC: 50 MG/DL — HIGH (ref 7–23)
CALCIUM SERPL-MCNC: 8.8 MG/DL — SIGNIFICANT CHANGE UP (ref 8.4–10.5)
CHLORIDE SERPL-SCNC: 108 MMOL/L — HIGH (ref 98–107)
CO2 SERPL-SCNC: 20 MMOL/L — LOW (ref 22–31)
CREAT SERPL-MCNC: 1.99 MG/DL — HIGH (ref 0.5–1.3)
GLUCOSE BLDC GLUCOMTR-MCNC: 136 MG/DL — HIGH (ref 70–99)
GLUCOSE BLDC GLUCOMTR-MCNC: 180 MG/DL — HIGH (ref 70–99)
GLUCOSE BLDC GLUCOMTR-MCNC: 199 MG/DL — HIGH (ref 70–99)
GLUCOSE BLDC GLUCOMTR-MCNC: 211 MG/DL — HIGH (ref 70–99)
GLUCOSE SERPL-MCNC: 196 MG/DL — HIGH (ref 70–99)
HCT VFR BLD CALC: 28.5 % — LOW (ref 34.5–45)
HGB BLD-MCNC: 8.8 G/DL — LOW (ref 11.5–15.5)
MAGNESIUM SERPL-MCNC: 2.1 MG/DL — SIGNIFICANT CHANGE UP (ref 1.6–2.6)
MCHC RBC-ENTMCNC: 28 PG — SIGNIFICANT CHANGE UP (ref 27–34)
MCHC RBC-ENTMCNC: 30.9 GM/DL — LOW (ref 32–36)
MCV RBC AUTO: 90.8 FL — SIGNIFICANT CHANGE UP (ref 80–100)
NRBC # BLD: 0 /100 WBCS — SIGNIFICANT CHANGE UP
NRBC # FLD: 0 K/UL — SIGNIFICANT CHANGE UP
PHOSPHATE SERPL-MCNC: 3.7 MG/DL — SIGNIFICANT CHANGE UP (ref 2.5–4.5)
PLATELET # BLD AUTO: 214 K/UL — SIGNIFICANT CHANGE UP (ref 150–400)
POTASSIUM SERPL-MCNC: 4.3 MMOL/L — SIGNIFICANT CHANGE UP (ref 3.5–5.3)
POTASSIUM SERPL-SCNC: 4.3 MMOL/L — SIGNIFICANT CHANGE UP (ref 3.5–5.3)
RBC # BLD: 3.14 M/UL — LOW (ref 3.8–5.2)
RBC # FLD: 15.4 % — HIGH (ref 10.3–14.5)
SODIUM SERPL-SCNC: 138 MMOL/L — SIGNIFICANT CHANGE UP (ref 135–145)
WBC # BLD: 13.69 K/UL — HIGH (ref 3.8–10.5)
WBC # FLD AUTO: 13.69 K/UL — HIGH (ref 3.8–10.5)

## 2021-11-06 PROCEDURE — 99233 SBSQ HOSP IP/OBS HIGH 50: CPT

## 2021-11-06 RX ORDER — HYDROMORPHONE HYDROCHLORIDE 2 MG/ML
0.5 INJECTION INTRAMUSCULAR; INTRAVENOUS; SUBCUTANEOUS ONCE
Refills: 0 | Status: DISCONTINUED | OUTPATIENT
Start: 2021-11-06 | End: 2021-11-06

## 2021-11-06 RX ORDER — INSULIN LISPRO 100/ML
VIAL (ML) SUBCUTANEOUS
Refills: 0 | Status: DISCONTINUED | OUTPATIENT
Start: 2021-11-06 | End: 2021-11-11

## 2021-11-06 RX ORDER — LEVOTHYROXINE SODIUM 125 MCG
75 TABLET ORAL ONCE
Refills: 0 | Status: COMPLETED | OUTPATIENT
Start: 2021-11-06 | End: 2021-11-07

## 2021-11-06 RX ORDER — LEVOTHYROXINE SODIUM 125 MCG
75 TABLET ORAL AT BEDTIME
Refills: 0 | Status: DISCONTINUED | OUTPATIENT
Start: 2021-11-06 | End: 2021-11-06

## 2021-11-06 RX ORDER — HYDRALAZINE HCL 50 MG
50 TABLET ORAL THREE TIMES A DAY
Refills: 0 | Status: DISCONTINUED | OUTPATIENT
Start: 2021-11-06 | End: 2021-11-08

## 2021-11-06 RX ORDER — LABETALOL HCL 100 MG
10 TABLET ORAL ONCE
Refills: 0 | Status: COMPLETED | OUTPATIENT
Start: 2021-11-06 | End: 2021-11-06

## 2021-11-06 RX ADMIN — Medication 400 MILLIGRAM(S): at 15:47

## 2021-11-06 RX ADMIN — Medication 10 MILLIGRAM(S): at 10:35

## 2021-11-06 RX ADMIN — ATORVASTATIN CALCIUM 40 MILLIGRAM(S): 80 TABLET, FILM COATED ORAL at 22:21

## 2021-11-06 RX ADMIN — HEPARIN SODIUM 5000 UNIT(S): 5000 INJECTION INTRAVENOUS; SUBCUTANEOUS at 22:21

## 2021-11-06 RX ADMIN — HYDROMORPHONE HYDROCHLORIDE 0.5 MILLIGRAM(S): 2 INJECTION INTRAMUSCULAR; INTRAVENOUS; SUBCUTANEOUS at 00:30

## 2021-11-06 RX ADMIN — INSULIN GLARGINE 20 UNIT(S): 100 INJECTION, SOLUTION SUBCUTANEOUS at 22:00

## 2021-11-06 RX ADMIN — Medication 10 MILLIGRAM(S): at 05:29

## 2021-11-06 RX ADMIN — HYDROMORPHONE HYDROCHLORIDE 0.5 MILLIGRAM(S): 2 INJECTION INTRAMUSCULAR; INTRAVENOUS; SUBCUTANEOUS at 18:30

## 2021-11-06 RX ADMIN — Medication 1000 MILLIGRAM(S): at 16:00

## 2021-11-06 RX ADMIN — SODIUM CHLORIDE 50 MILLILITER(S): 9 INJECTION, SOLUTION INTRAVENOUS at 07:36

## 2021-11-06 RX ADMIN — Medication 10 MILLIGRAM(S): at 11:01

## 2021-11-06 RX ADMIN — Medication 325 MILLIGRAM(S): at 16:55

## 2021-11-06 RX ADMIN — Medication 1000 MILLIGRAM(S): at 22:51

## 2021-11-06 RX ADMIN — HYDROMORPHONE HYDROCHLORIDE 0.5 MILLIGRAM(S): 2 INJECTION INTRAMUSCULAR; INTRAVENOUS; SUBCUTANEOUS at 00:03

## 2021-11-06 RX ADMIN — Medication 10 MILLIGRAM(S): at 04:04

## 2021-11-06 RX ADMIN — HEPARIN SODIUM 5000 UNIT(S): 5000 INJECTION INTRAVENOUS; SUBCUTANEOUS at 14:34

## 2021-11-06 RX ADMIN — HYDROMORPHONE HYDROCHLORIDE 0.5 MILLIGRAM(S): 2 INJECTION INTRAMUSCULAR; INTRAVENOUS; SUBCUTANEOUS at 18:05

## 2021-11-06 RX ADMIN — Medication 50 MILLIGRAM(S): at 22:21

## 2021-11-06 RX ADMIN — Medication 10 MILLIGRAM(S): at 00:03

## 2021-11-06 RX ADMIN — Medication 2: at 05:51

## 2021-11-06 RX ADMIN — Medication 400 MILLIGRAM(S): at 22:21

## 2021-11-06 RX ADMIN — Medication 1: at 22:31

## 2021-11-06 RX ADMIN — Medication 50 MILLIGRAM(S): at 14:34

## 2021-11-06 RX ADMIN — Medication 1: at 00:00

## 2021-11-06 RX ADMIN — Medication 1: at 11:43

## 2021-11-06 RX ADMIN — Medication 1 APPLICATION(S): at 16:55

## 2021-11-06 RX ADMIN — HEPARIN SODIUM 5000 UNIT(S): 5000 INJECTION INTRAVENOUS; SUBCUTANEOUS at 05:29

## 2021-11-06 RX ADMIN — Medication 10 MILLIGRAM(S): at 00:35

## 2021-11-06 RX ADMIN — Medication 1 APPLICATION(S): at 05:28

## 2021-11-06 RX ADMIN — Medication 10 MILLIGRAM(S): at 07:36

## 2021-11-06 NOTE — PROGRESS NOTE ADULT - SUBJECTIVE AND OBJECTIVE BOX
HPI: 70yo F w/ pmh inclusive of htn, hcl, DM, CKD, breast ca, being treated outpt by ENT Dr. Gil for left ear malignancy, transferred from [facility] for ENT consult of Left ear infection. Pt was admitted at [facility] on 10/24 after experiencing syncopal episode and after extensive workup there, is now transferred here for ENT mngmt of Left ear malignancy. S/p L temporal bone resection with Dr. Park and Dr. Gil 11/3.    Remains extubated on RA  Mental status improving  CTH obtained, negative for acute findings    Vital Signs Last 24 Hrs  T(C): 37.2 (06 Nov 2021 08:00), Max: 37.2 (06 Nov 2021 08:00)  T(F): 99 (06 Nov 2021 08:00), Max: 99 (06 Nov 2021 08:00)  HR: 86 (06 Nov 2021 10:00) (70 - 94)  BP: --  BP(mean): --  RR: 17 (06 Nov 2021 10:00) (12 - 19)  SpO2: 99% (06 Nov 2021 10:00) (91% - 99%)      Physical Exam:   General: NAD  Respiratory: No respiratory distress, stridor, or stertor  OU:  EOMI  AD: Pinna wnl, EAC clear, TM intact, no effusion  AS: left supraclav flap in place; skin healing well without blotting; incisions c/d/i  Nose: ulcerating lesion on left skin of nare; s/p biopsy, small clot/scabbing of lesion  OC/OP: tongue normal, floor of mouth WNL, no masses or lesions, OP clear  Neck: soft/flat, no LAD    A/P: 71yFemale w/ pmh inclusive of htn, hcl, DM, CKD, breast ca, and fungating ear mass admitted for preop workup and clearance prior to left temporal bone resection on 11/3 with Dr. Park and Dr. Gil  - start diet, resume PO meds for HTN  - f/u surgical path  - closely monitor for hyperkalemia and acidosis  - monitor mental status  - electrolyte repletions/management  - sqh, SCDs

## 2021-11-06 NOTE — PROGRESS NOTE ADULT - ASSESSMENT
70yo F w/ pmh inclusive of htn, hcl, DM, CKD, breast ca, being treated outpt by ENT Dr. Gil for left ear malignancy, transferred from [facility] for ENT consult of Left ear infection. Pt was admitted at [facility] on 10/24 after experiencing syncopal episode and after extensive workup there, is now transferred here for ENT mngmt of Left ear malignancy s/p L auriculectomy, left lateral temporal bone resection, with parotidectomy, and L SND 2-5 removed en bloc. Reconstructed with L supraclavicular flap 11/4.    24 HOUR EVENTS:  CTH no intracranial hemorrhage  Passed bedside speech and swallow  start diet and PO home meds    PLAN:    Neurologic:   - AOOx3; waxes and wanes    - PO tylenol PRN Q6 for pain  - Off sedation  - CTH wnl    Respiratory:   - Self extubated  - Satting well on rm air  - No active issues    Cardiovascular:   - hypertension to 160s-170's  - start home hydralizine 50mg TID  - c/w home atorvastatin    Gastrointestinal/Nutrition:   - Reg diet    Renal/Genitourinary:   - IV lock  - Monitor electrolytes with daily BMP    Hematologic:   - VTE prophylaxis: SubQ heparin Q8h    Infectious Disease:   - D/C cefepime  - monitor for fevers  - bacitracin ointment on ear    Lines/Tubes:  - PIV  - a-line  - baptiste catheter    Endocrine:   - ISS  - resume pre-meals after tolerating PO  - hypothyroidism, start PO synthroid    Disposition: SICU     70yo F w/ pmh inclusive of htn, hcl, DM, CKD, breast ca, being treated outpt by ENT Dr. Gil for left ear malignancy, transferred from [facility] for ENT consult of Left ear infection. Pt was admitted at [facility] on 10/24 after experiencing syncopal episode and after extensive workup there, is now transferred here for ENT mngmt of Left ear malignancy s/p L auriculectomy, left lateral temporal bone resection, with parotidectomy, and L SND 2-5 removed en bloc. Reconstructed with L supraclavicular flap 11/4.    24 HOUR EVENTS:  CTH no intracranial hemorrhage  Passed bedside speech and swallow  start diet and PO home meds    PLAN:    Neurologic:   - AOOx3; waxes and wanes    - PO tylenol PRN Q6 for pain  - Off sedation  - CTH wnl    Respiratory:   - Self extubated  - Satting well on rm air  - No active issues    Cardiovascular:   - hypertension to 160s-170's  - start home hydralizine 50mg TID  - c/w home atorvastatin    Gastrointestinal/Nutrition:   - Reg diet    Renal/Genitourinary:   - IV lock  - Monitor electrolytes with daily BMP    Hematologic:   - VTE prophylaxis: SubQ heparin Q8h --> switch to lovenox    Infectious Disease:   - D/C cefepime  - monitor for fevers; afebrile currently  - bacitracin ointment on ear    Lines/Tubes:  - PIV  - Can d/c a-line and baptiste    Endocrine:   - ISS  - resume pre-meals after tolerating PO  - hypothyroidism, start PO synthroid 75 mcg    Disposition: SICU; list for floor

## 2021-11-06 NOTE — PROGRESS NOTE ADULT - SUBJECTIVE AND OBJECTIVE BOX
HISTORY  71y Female    24 HOUR EVENTS:      NEURO  RASS:     GCS:     CAM ICU:  Exam:   Meds: acetaminophen   IVPB .. 1000 milliGRAM(s) IV Intermittent every 6 hours PRN Temp greater or equal to 38C (100.4F), Mild Pain (1 - 3)    [x] Adequacy of sedation and pain control has been assessed and adjusted      RESPIRATORY  RR: 12 (11-06-21 @ 01:00) (7 - 19)  SpO2: 97% (11-06-21 @ 01:00) (94% - 98%)  Wt(kg): --  Exam:   Mechanical Ventilation:   ABG - ( 04 Nov 2021 20:41 )  pH: 7.31  /  pCO2: 41    /  pO2: 176   / HCO3: 21    / Base Excess: -5.3  /  SaO2: 99.5    Lactate: x                [ ] Extubation Readiness Assessed  Meds:       CARDIOVASCULAR  HR: 70 (11-06-21 @ 01:00) (70 - 97)  BP: 161/61 (11-05-21 @ 08:00) (161/61 - 161/61)  BP(mean): 87 (11-05-21 @ 08:00) (87 - 87)  ABP: 146/51 (11-06-21 @ 01:00) (102/96 - 192/77)  ABP(mean): 87 (11-06-21 @ 01:00) (87 - 125)  Wt(kg): --  CVP(cm H2O): --      Exam:  Cardiac Rhythm:  Perfusion     [ ]Adequate   [ ]Inadequate  Mentation   [ ]Normal       [ ]Reduced  Extremities  [ ]Warm         [ ]Cool  Volume Status [ ]Hypervolemic [ ]Euvolemic [ ]Hypovolemic  Meds: hydrALAZINE Injectable 10 milliGRAM(s) IV Push every 4 hours  labetalol Injectable 10 milliGRAM(s) IV Push every 6 hours        GI/NUTRITION  Exam:  Diet:  Meds:     GENITOURINARY  I&O's Detail    11-04 @ 07:01  -  11-05 @ 07:00  --------------------------------------------------------  IN:    dextrose 5% + sodium chloride 0.45%: 1600 mL    Lactated Ringers: 500 mL  Total IN: 2100 mL    OUT:    Bulb (mL): 53 mL    Bulb (mL): 120 mL    Indwelling Catheter - Urethral (mL): 3320 mL  Total OUT: 3493 mL    Total NET: -1393 mL      11-05 @ 07:01  -  11-06 @ 01:42  --------------------------------------------------------  IN:    Lactated Ringers: 650 mL    Lactated Ringers: 400 mL  Total IN: 1050 mL    OUT:    Bulb (mL): 40 mL    Bulb (mL): 60 mL    Indwelling Catheter - Urethral (mL): 2365 mL  Total OUT: 2465 mL    Total NET: -1415 mL          11-05    139  |  108<H>  |  55<H>  ----------------------------<  224<H>  5.1   |  19<L>  |  2.17<H>    Ca    8.6      05 Nov 2021 01:46  Phos  3.7     11-05  Mg     1.80     11-05      [ ] Baptiste catheter, indication:   Meds: lactated ringers. 1000 milliLiter(s) IV Continuous <Continuous>  sodium bicarbonate 325 milliGRAM(s) Oral two times a day        HEMATOLOGIC  Meds: heparin   Injectable 5000 Unit(s) SubCutaneous every 8 hours    [x] VTE Prophylaxis                        8.8    13.69 )-----------( 214      ( 06 Nov 2021 01:14 )             28.5       Transfusion     [ ] PRBC   [ ] Platelets   [ ] FFP   [ ] Cryoprecipitate      INFECTIOUS DISEASES  T(C): 36.7 (11-06-21 @ 00:00), Max: 36.9 (11-05-21 @ 04:00)  Wt(kg): --  WBC Count: 13.69 K/uL (11-06 @ 01:14)  WBC Count: 15.25 K/uL (11-05 @ 01:46)    Recent Cultures:    Meds: influenza  Vaccine (HIGH DOSE) 0.7 milliLiter(s) IntraMuscular once        ENDOCRINE  Capillary Blood Glucose    Meds: atorvastatin 40 milliGRAM(s) Oral at bedtime  dextrose 50% Injectable 25 Gram(s) IV Push once  dextrose 50% Injectable 12.5 Gram(s) IV Push once  dextrose 50% Injectable 25 Gram(s) IV Push once  insulin glargine Injectable (LANTUS) 20 Unit(s) SubCutaneous at bedtime  insulin lispro (ADMELOG) corrective regimen sliding scale   SubCutaneous every 6 hours  levothyroxine Injectable 60 MICROGram(s) IV Push at bedtime        ACCESS DEVICES:  [x] Peripheral IV  [ ] Central Venous Line	[ ] R	[ ] L	[ ] IJ	[ ] Fem	[ ] SC	Placed:   [ ] Arterial Line		[ ] R	[ ] L	[ ] Fem	[ ] Rad	[ ] Ax	Placed:   [ ] PICC:					[ ] Mediport  [ ] Urinary Catheter, Date Placed:   [x] Necessity of urinary, arterial, and venous catheters discussed    OTHER MEDICATIONS:  BACItracin   Ointment 1 Application(s) Topical two times a day    Assessment:   72yo F w/ pmh inclusive of htn, hcl, DM, CKD, breast ca, being 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 Pt was admitted at [facility] on 10/24 after experiencing syncopal episode and after extensive workup there, is now transferred here for ENT mngmt of Left ear malignancy s/p L auriculectomy, left lateral temporal bone resection, with parotidectomy, and L SND 2-5 removed en bloc. Reconstructed with L supraclavicular flap 11/4.      PLAN:      Neurologic:   - AOOx3; waxes and wanes    - IV tylenol PRN Q6 for pain  - Off sedation  - CTH done, pending official read    Respiratory:   - Self extubated  - Satting well on rm air  - No active issues    Cardiovascular:   - hypertension to 160s-170's  - takes hydralizine 50mg TID at home  - IV hydralizine standing for SBP >175 10mg Q4   - IV labetalol 10 q6  - c/w home atorvastatin  - Flap checks as per ENT    Gastrointestinal/Nutrition:   - NPO    Renal/Genitourinary:   - LR @ 50/hr  -     Hematologic:   - VTE prophylaxis: SubQ heparin Q8h    Infectious Disease:   - c/w cefepime  - monitor for fevers  - bacitracin ointment on ear    Lines/Tubes:  - PIV  - a-line  - baptiste catheter    Endocrine:   - ISS  - resume pre-meals after tolerating PO  - hypothyroidism, c/w levothyroxine 60mg at bedtime    Disposition: SICU    Critical Care Diagnoses:  - severe acidemia  - Flap checks   HISTORY  70yo F w/ pmh inclusive of htn, hcl, DM, CKD, breast ca, being treated outpt by ENT Dr. Gli for left ear malignancy, transferred from [facility] for ENT consult of Left ear infection. Pt was admitted at [facility] on 10/24 after experiencing syncopal episode and after extensive workup there, is now transferred here for ENT mngmt of Left ear malignancy as pt's ENT is out of [facility]. Pt reporting increased "burning" pain of left ear w/ increased foul smelling drainage from left ear lesion. No recent fever/chills, No photophobia/eye pain/changes in vision, no sore throat/dysphagia, No chest pain/palpitations, no SOB/cough/wheeze/stridor, No abdominal pain, No N/V/D, no dysuria/frequency/discharge, No neck/back pain, no rash, no new focal neuro symptoms.     24 HOUR EVENTS:  CTH done, final read pending  Appeared in pain, given dilauded   D/C'd cefipime   Began Labetalol 10 IV     NEURO  RASS:   0  GCS:   15  CAM ICU: Negative  Exam: AAOx2, Follows commands, NAD  Meds: acetaminophen   IVPB .. 1000 milliGRAM(s) IV Intermittent every 6 hours PRN Temp greater or equal to 38C (100.4F), Mild Pain (1 - 3)    [x] Adequacy of sedation and pain control has been assessed and adjusted      RESPIRATORY  RR: 12 (11-06-21 @ 01:00) (7 - 19)  SpO2: 97% (11-06-21 @ 01:00) (94% - 98%)  Wt(kg): --  Exam: Satting well on rm air, b/l chest rise, no increased work of breathing  ABG - ( 04 Nov 2021 20:41 )  pH: 7.31  /  pCO2: 41    /  pO2: 176   / HCO3: 21    / Base Excess: -5.3  /  SaO2: 99.5    Lactate: x      Meds:       CARDIOVASCULAR  HR: 70 (11-06-21 @ 01:00) (70 - 97)  BP: 161/61 (11-05-21 @ 08:00) (161/61 - 161/61)  BP(mean): 87 (11-05-21 @ 08:00) (87 - 87)  ABP: 146/51 (11-06-21 @ 01:00) (102/96 - 192/77)  ABP(mean): 87 (11-06-21 @ 01:00) (87 - 125)  Wt(kg): --  CVP(cm H2O): --      Exam: RRR, Appears well perfused, HTN with SBP's 160's-170's  Perfusion     [x ]Adequate   [ ]Inadequate  Mentation   [ ]Normal       [x ]Reduced  Extremities  [x ]Warm         [ ]Cool  Volume Status [ ]Hypervolemic [x ]Euvolemic [ ]Hypovolemic  Meds: hydrALAZINE Injectable 10 milliGRAM(s) IV Push every 4 hours  labetalol Injectable 10 milliGRAM(s) IV Push every 6 hours        GI/NUTRITION  Exam: Soft, NT, ND  Diet: NPO  Meds:     GENITOURINARY  I&O's Detail    11-04 @ 07:01  -  11-05 @ 07:00  --------------------------------------------------------  IN:    dextrose 5% + sodium chloride 0.45%: 1600 mL    Lactated Ringers: 500 mL  Total IN: 2100 mL    OUT:    Bulb (mL): 53 mL    Bulb (mL): 120 mL    Indwelling Catheter - Urethral (mL): 3320 mL  Total OUT: 3493 mL    Total NET: -1393 mL      11-05 @ 07:01  -  11-06 @ 01:42  --------------------------------------------------------  IN:    Lactated Ringers: 650 mL    Lactated Ringers: 400 mL  Total IN: 1050 mL    OUT:    Bulb (mL): 40 mL    Bulb (mL): 60 mL    Indwelling Catheter - Urethral (mL): 2365 mL  Total OUT: 2465 mL    Total NET: -1415 mL          11-05    139  |  108<H>  |  55<H>  ----------------------------<  224<H>  5.1   |  19<L>  |  2.17<H>    Ca    8.6      05 Nov 2021 01:46  Phos  3.7     11-05  Mg     1.80     11-05      [ ] Baptiste catheter, indication:   Meds: lactated ringers. 1000 milliLiter(s) IV Continuous <Continuous>  sodium bicarbonate 325 milliGRAM(s) Oral two times a day        HEMATOLOGIC  Meds: heparin   Injectable 5000 Unit(s) SubCutaneous every 8 hours    [x] VTE Prophylaxis                        8.8    13.69 )-----------( 214      ( 06 Nov 2021 01:14 )             28.5       Transfusion     [ ] PRBC   [ ] Platelets   [ ] FFP   [ ] Cryoprecipitate      INFECTIOUS DISEASES  T(C): 36.7 (11-06-21 @ 00:00), Max: 36.9 (11-05-21 @ 04:00)  Wt(kg): --  WBC Count: 13.69 K/uL (11-06 @ 01:14)  WBC Count: 15.25 K/uL (11-05 @ 01:46)    Recent Cultures:    Meds: influenza  Vaccine (HIGH DOSE) 0.7 milliLiter(s) IntraMuscular once        ENDOCRINE  Capillary Blood Glucose    Meds: atorvastatin 40 milliGRAM(s) Oral at bedtime  dextrose 50% Injectable 25 Gram(s) IV Push once  dextrose 50% Injectable 12.5 Gram(s) IV Push once  dextrose 50% Injectable 25 Gram(s) IV Push once  insulin glargine Injectable (LANTUS) 20 Unit(s) SubCutaneous at bedtime  insulin lispro (ADMELOG) corrective regimen sliding scale   SubCutaneous every 6 hours  levothyroxine Injectable 60 MICROGram(s) IV Push at bedtime        ACCESS DEVICES:  [x] Peripheral IV  [ ] Central Venous Line	[ ] R	[ ] L	[ ] IJ	[ ] Fem	[ ] SC	Placed:   [ ] Arterial Line		[ ] R	[ ] L	[ ] Fem	[ ] Rad	[ ] Ax	Placed:   [ ] PICC:					[ ] Mediport  [ ] Urinary Catheter, Date Placed:   [x] Necessity of urinary, arterial, and venous catheters discussed    OTHER MEDICATIONS:  BACItracin   Ointment 1 Application(s) Topical two times a day    Assessment:   70yo F w/ pmh inclusive of htn, hcl, DM, CKD, breast ca, being treated outpt by ENT Dr. Gil for left ear malignancy, transferred from [facility] for ENT consult of Left ear infection. Pt was admitted at [facility] on 10/24 after experiencing syncopal episode and after extensive workup there, is now transferred here for ENT mngmt of Left ear malignancy s/p L auriculectomy, left lateral temporal bone resection, with parotidectomy, and L SND 2-5 removed en bloc. Reconstructed with L supraclavicular flap 11/4.      PLAN:      Neurologic:   - AOOx3; waxes and wanes    - IV tylenol PRN Q6 for pain  - Off sedation  - CTH done, pending official read    Respiratory:   - Self extubated  - Satting well on rm air  - No active issues    Cardiovascular:   - hypertension to 160s-170's  - takes hydralizine 50mg TID at home  - IV hydralizine standing for SBP >175 10mg Q4   - IV labetalol 10 q6  - c/w home atorvastatin  - Flap checks as per ENT    Gastrointestinal/Nutrition:   - NPO    Renal/Genitourinary:   - LR @ 50/hr  - Monitor electrolytes with daily BMP    Hematologic:   - VTE prophylaxis: SubQ heparin Q8h    Infectious Disease:   - D/C cefepime  - monitor for fevers  - bacitracin ointment on ear    Lines/Tubes:  - PIV  - a-line  - baptiste catheter    Endocrine:   - ISS  - resume pre-meals after tolerating PO  - hypothyroidism, c/w levothyroxine 60mg at bedtime    Disposition: SICU     HISTORY  70yo F w/ pmh inclusive of htn, hcl, DM, CKD, breast ca, being treated outpt by ENT Dr. Gil for left ear malignancy, transferred from [facility] for ENT consult of Left ear infection. Pt was admitted at [facility] on 10/24 after experiencing syncopal episode and after extensive workup there, is now transferred here for ENT mngmt of Left ear malignancy as pt's ENT is out of [facility]. Pt reporting increased "burning" pain of left ear w/ increased foul smelling drainage from left ear lesion. No recent fever/chills, No photophobia/eye pain/changes in vision, no sore throat/dysphagia, No chest pain/palpitations, no SOB/cough/wheeze/stridor, No abdominal pain, No N/V/D, no dysuria/frequency/discharge, No neck/back pain, no rash, no new focal neuro symptoms.     24 HOUR EVENTS:  CTH no intracranial hemorrhage  Passed bedside speech and swallow  start diet and PO home meds    NEURO  RASS:   0  GCS:   15  CAM ICU: Negative  Exam: AAOx2, Follows commands, NAD  Meds: acetaminophen   IVPB .. 1000 milliGRAM(s) IV Intermittent every 6 hours PRN Temp greater or equal to 38C (100.4F), Mild Pain (1 - 3)    [x] Adequacy of sedation and pain control has been assessed and adjusted      RESPIRATORY  RR: 12 (11-06-21 @ 01:00) (7 - 19)  SpO2: 97% (11-06-21 @ 01:00) (94% - 98%)  Wt(kg): --  Exam: Satting well on rm air, b/l chest rise, no increased work of breathing  ABG - ( 04 Nov 2021 20:41 )  pH: 7.31  /  pCO2: 41    /  pO2: 176   / HCO3: 21    / Base Excess: -5.3  /  SaO2: 99.5    Lactate: x      Meds:       CARDIOVASCULAR  HR: 70 (11-06-21 @ 01:00) (70 - 97)  BP: 161/61 (11-05-21 @ 08:00) (161/61 - 161/61)  BP(mean): 87 (11-05-21 @ 08:00) (87 - 87)  ABP: 146/51 (11-06-21 @ 01:00) (102/96 - 192/77)  ABP(mean): 87 (11-06-21 @ 01:00) (87 - 125)  Wt(kg): --  CVP(cm H2O): --      Exam: RRR, Appears well perfused, HTN with SBP's 160's-170's  Perfusion     [x ]Adequate   [ ]Inadequate  Mentation   [ ]Normal       [x ]Reduced  Extremities  [x ]Warm         [ ]Cool  Volume Status [ ]Hypervolemic [x ]Euvolemic [ ]Hypovolemic  Meds: hydrALAZINE Injectable 10 milliGRAM(s) IV Push every 4 hours  labetalol Injectable 10 milliGRAM(s) IV Push every 6 hours        GI/NUTRITION  Exam: Soft, NT, ND  Diet: NPO  Meds:     GENITOURINARY  I&O's Detail    11-04 @ 07:01  -  11-05 @ 07:00  --------------------------------------------------------  IN:    dextrose 5% + sodium chloride 0.45%: 1600 mL    Lactated Ringers: 500 mL  Total IN: 2100 mL    OUT:    Bulb (mL): 53 mL    Bulb (mL): 120 mL    Indwelling Catheter - Urethral (mL): 3320 mL  Total OUT: 3493 mL    Total NET: -1393 mL      11-05 @ 07:01  -  11-06 @ 01:42  --------------------------------------------------------  IN:    Lactated Ringers: 650 mL    Lactated Ringers: 400 mL  Total IN: 1050 mL    OUT:    Bulb (mL): 40 mL    Bulb (mL): 60 mL    Indwelling Catheter - Urethral (mL): 2365 mL  Total OUT: 2465 mL    Total NET: -1415 mL          11-05    139  |  108<H>  |  55<H>  ----------------------------<  224<H>  5.1   |  19<L>  |  2.17<H>    Ca    8.6      05 Nov 2021 01:46  Phos  3.7     11-05  Mg     1.80     11-05      [ ] [place] catheter, indication:   Meds: lactated ringers. 1000 milliLiter(s) IV Continuous <Continuous>  sodium bicarbonate 325 milliGRAM(s) Oral two times a day        HEMATOLOGIC  Meds: heparin   Injectable 5000 Unit(s) SubCutaneous every 8 hours    [x] VTE Prophylaxis                        8.8    13.69 )-----------( 214      ( 06 Nov 2021 01:14 )             28.5       Transfusion     [ ] PRBC   [ ] Platelets   [ ] FFP   [ ] Cryoprecipitate      INFECTIOUS DISEASES  T(C): 36.7 (11-06-21 @ 00:00), Max: 36.9 (11-05-21 @ 04:00)  Wt(kg): --  WBC Count: 13.69 K/uL (11-06 @ 01:14)  WBC Count: 15.25 K/uL (11-05 @ 01:46)    Recent Cultures:    Meds: influenza  Vaccine (HIGH DOSE) 0.7 milliLiter(s) IntraMuscular once        ENDOCRINE  Capillary Blood Glucose    Meds: atorvastatin 40 milliGRAM(s) Oral at bedtime  dextrose 50% Injectable 25 Gram(s) IV Push once  dextrose 50% Injectable 12.5 Gram(s) IV Push once  dextrose 50% Injectable 25 Gram(s) IV Push once  insulin glargine Injectable (LANTUS) 20 Unit(s) SubCutaneous at bedtime  insulin lispro (ADMELOG) corrective regimen sliding scale   SubCutaneous every 6 hours  levothyroxine Injectable 60 MICROGram(s) IV Push at bedtime        ACCESS DEVICES:  [x] Peripheral IV  [ ] Central Venous Line	[ ] R	[ ] L	[ ] IJ	[ ] Fem	[ ] SC	Placed:   [ ] Arterial Line		[ ] R	[ ] L	[ ] Fem	[ ] Rad	[ ] Ax	Placed:   [ ] PICC:					[ ] Mediport  [ ] Urinary Catheter, Date Placed:   [x] Necessity of urinary, arterial, and venous catheters discussed    OTHER MEDICATIONS:  BACItracin   Ointment 1 Application(s) Topical two times a day

## 2021-11-07 LAB
ANION GAP SERPL CALC-SCNC: 11 MMOL/L — SIGNIFICANT CHANGE UP (ref 7–14)
BUN SERPL-MCNC: 57 MG/DL — HIGH (ref 7–23)
CALCIUM SERPL-MCNC: 8.4 MG/DL — SIGNIFICANT CHANGE UP (ref 8.4–10.5)
CHLORIDE SERPL-SCNC: 107 MMOL/L — SIGNIFICANT CHANGE UP (ref 98–107)
CO2 SERPL-SCNC: 22 MMOL/L — SIGNIFICANT CHANGE UP (ref 22–31)
CREAT ?TM UR-MCNC: 53 MG/DL — SIGNIFICANT CHANGE UP
CREAT SERPL-MCNC: 2.15 MG/DL — HIGH (ref 0.5–1.3)
GLUCOSE BLDC GLUCOMTR-MCNC: 109 MG/DL — HIGH (ref 70–99)
GLUCOSE BLDC GLUCOMTR-MCNC: 116 MG/DL — HIGH (ref 70–99)
GLUCOSE BLDC GLUCOMTR-MCNC: 138 MG/DL — HIGH (ref 70–99)
GLUCOSE BLDC GLUCOMTR-MCNC: 189 MG/DL — HIGH (ref 70–99)
GLUCOSE SERPL-MCNC: 126 MG/DL — HIGH (ref 70–99)
HCT VFR BLD CALC: 27.4 % — LOW (ref 34.5–45)
HGB BLD-MCNC: 8.3 G/DL — LOW (ref 11.5–15.5)
MAGNESIUM SERPL-MCNC: 2.2 MG/DL — SIGNIFICANT CHANGE UP (ref 1.6–2.6)
MCHC RBC-ENTMCNC: 28.4 PG — SIGNIFICANT CHANGE UP (ref 27–34)
MCHC RBC-ENTMCNC: 30.3 GM/DL — LOW (ref 32–36)
MCV RBC AUTO: 93.8 FL — SIGNIFICANT CHANGE UP (ref 80–100)
NRBC # BLD: 0 /100 WBCS — SIGNIFICANT CHANGE UP
NRBC # FLD: 0 K/UL — SIGNIFICANT CHANGE UP
PHOSPHATE SERPL-MCNC: 4.1 MG/DL — SIGNIFICANT CHANGE UP (ref 2.5–4.5)
PLATELET # BLD AUTO: 220 K/UL — SIGNIFICANT CHANGE UP (ref 150–400)
POTASSIUM SERPL-MCNC: 4.6 MMOL/L — SIGNIFICANT CHANGE UP (ref 3.5–5.3)
POTASSIUM SERPL-SCNC: 4.6 MMOL/L — SIGNIFICANT CHANGE UP (ref 3.5–5.3)
RBC # BLD: 2.92 M/UL — LOW (ref 3.8–5.2)
RBC # FLD: 14.9 % — HIGH (ref 10.3–14.5)
SODIUM SERPL-SCNC: 140 MMOL/L — SIGNIFICANT CHANGE UP (ref 135–145)
SODIUM UR-SCNC: 52 MMOL/L — SIGNIFICANT CHANGE UP
WBC # BLD: 11.85 K/UL — HIGH (ref 3.8–10.5)
WBC # FLD AUTO: 11.85 K/UL — HIGH (ref 3.8–10.5)

## 2021-11-07 PROCEDURE — 99231 SBSQ HOSP IP/OBS SF/LOW 25: CPT | Mod: 24,GC

## 2021-11-07 RX ORDER — ACETAMINOPHEN 500 MG
650 TABLET ORAL EVERY 6 HOURS
Refills: 0 | Status: DISCONTINUED | OUTPATIENT
Start: 2021-11-07 | End: 2021-11-11

## 2021-11-07 RX ORDER — POLYETHYLENE GLYCOL 3350 17 G/17G
17 POWDER, FOR SOLUTION ORAL ONCE
Refills: 0 | Status: COMPLETED | OUTPATIENT
Start: 2021-11-07 | End: 2021-11-07

## 2021-11-07 RX ORDER — IBUPROFEN 200 MG
400 TABLET ORAL EVERY 6 HOURS
Refills: 0 | Status: DISCONTINUED | OUTPATIENT
Start: 2021-11-07 | End: 2021-11-11

## 2021-11-07 RX ORDER — ENOXAPARIN SODIUM 100 MG/ML
40 INJECTION SUBCUTANEOUS DAILY
Refills: 0 | Status: DISCONTINUED | OUTPATIENT
Start: 2021-11-07 | End: 2021-11-11

## 2021-11-07 RX ORDER — OXYCODONE HYDROCHLORIDE 5 MG/1
5 TABLET ORAL EVERY 6 HOURS
Refills: 0 | Status: DISCONTINUED | OUTPATIENT
Start: 2021-11-07 | End: 2021-11-11

## 2021-11-07 RX ORDER — METOPROLOL TARTRATE 50 MG
5 TABLET ORAL ONCE
Refills: 0 | Status: COMPLETED | OUTPATIENT
Start: 2021-11-07 | End: 2021-11-07

## 2021-11-07 RX ADMIN — Medication 5 MILLIGRAM(S): at 19:42

## 2021-11-07 RX ADMIN — ATORVASTATIN CALCIUM 40 MILLIGRAM(S): 80 TABLET, FILM COATED ORAL at 21:46

## 2021-11-07 RX ADMIN — Medication 50 MILLIGRAM(S): at 21:46

## 2021-11-07 RX ADMIN — OXYCODONE HYDROCHLORIDE 5 MILLIGRAM(S): 5 TABLET ORAL at 22:14

## 2021-11-07 RX ADMIN — OXYCODONE HYDROCHLORIDE 5 MILLIGRAM(S): 5 TABLET ORAL at 23:00

## 2021-11-07 RX ADMIN — Medication 650 MILLIGRAM(S): at 19:23

## 2021-11-07 RX ADMIN — INSULIN GLARGINE 20 UNIT(S): 100 INJECTION, SOLUTION SUBCUTANEOUS at 22:15

## 2021-11-07 RX ADMIN — Medication 325 MILLIGRAM(S): at 17:02

## 2021-11-07 RX ADMIN — HEPARIN SODIUM 5000 UNIT(S): 5000 INJECTION INTRAVENOUS; SUBCUTANEOUS at 06:30

## 2021-11-07 RX ADMIN — Medication 1: at 22:15

## 2021-11-07 RX ADMIN — Medication 5 MILLIGRAM(S): at 17:16

## 2021-11-07 RX ADMIN — Medication 325 MILLIGRAM(S): at 06:30

## 2021-11-07 RX ADMIN — Medication 50 MILLIGRAM(S): at 14:08

## 2021-11-07 RX ADMIN — Medication 1 APPLICATION(S): at 17:02

## 2021-11-07 RX ADMIN — Medication 650 MILLIGRAM(S): at 10:01

## 2021-11-07 RX ADMIN — Medication 75 MICROGRAM(S): at 06:30

## 2021-11-07 RX ADMIN — Medication 650 MILLIGRAM(S): at 10:31

## 2021-11-07 RX ADMIN — Medication 1 APPLICATION(S): at 06:31

## 2021-11-07 RX ADMIN — ENOXAPARIN SODIUM 40 MILLIGRAM(S): 100 INJECTION SUBCUTANEOUS at 12:05

## 2021-11-07 RX ADMIN — Medication 50 MILLIGRAM(S): at 06:30

## 2021-11-07 RX ADMIN — Medication 650 MILLIGRAM(S): at 18:53

## 2021-11-07 RX ADMIN — POLYETHYLENE GLYCOL 3350 17 GRAM(S): 17 POWDER, FOR SOLUTION ORAL at 14:59

## 2021-11-07 NOTE — PROGRESS NOTE ADULT - ASSESSMENT
72yo F w/ pmh inclusive of htn, hcl, DM, CKD, breast ca, being treated outpt by ENT Dr. Gil for left ear malignancy, transferred from [facility] for ENT consult of Left ear infection. Pt was admitted at [facility] on 10/24 after experiencing syncopal episode and after extensive workup there, is now transferred here for ENT mngmt of Left ear malignancy s/p L auriculectomy, left lateral temporal bone resection, with parotidectomy, and L SND 2-5 removed en bloc. Reconstructed with L supraclavicular flap 11/4.    PLAN:    Neurologic:   - AOOx3; waxes and wanes    - PO tylenol PRN Q6 for pain  - Off sedation  - CTH wnl    Respiratory:   - Self extubated  - Satting well on rm air  - No active issues    Cardiovascular:   - hypertension to 160s-170's  - start home hydralizine 50mg TID  - c/w home atorvastatin    Gastrointestinal/Nutrition:   - Reg diet    Renal/Genitourinary:   - IV lock  - Monitor electrolytes with daily BMP    Hematologic:   - VTE prophylaxis: SubQ heparin Q8h    Infectious Disease:   - D/C cefepime  - monitor for fevers  - bacitracin ointment on ear    Lines/Tubes:  - PIV  - a-line  - baptiste catheter    Endocrine:   - ISS  - resume pre-meals after tolerating PO  - hypothyroidism, start PO synthroid 75 mcg    Disposition: SICU     72yo F w/ pmh inclusive of htn, hcl, DM, CKD, breast ca, being treated outpt by ENT Dr. Gil for left ear malignancy, transferred from [facility] for ENT consult of Left ear infection. Pt was admitted at [facility] on 10/24 after experiencing syncopal episode and after extensive workup there, is now transferred here for ENT mngmt of Left ear malignancy s/p L auriculectomy, left lateral temporal bone resection, with parotidectomy, and L SND 2-5 removed en bloc. Reconstructed with L supraclavicular flap 11/4. Post operatively, concern for difficult extubation now s/p self extubation without issues. Also concern for altered mental status post op, CT head wnl, now mentating at baseline.     PLAN:    Neurologic:   - AAOx3, no neurologic issues  - PO tylenol PRN Q6 for pain  - PRN dilaudid for breakthrough pain  - CTH wnl    Respiratory:   - breathing comfortably on NC  - encourage incentive spirometry  - maintain SpO2 >92%    Cardiovascular:   - hypertension to 160s-170's  - start home hydralazine 50mg TID  - hypertension better controlled with home medications  - c/w home atorvastatin    Gastrointestinal/Nutrition:   - Reg diet  - monitor for BM's    Renal/Genitourinary:   - IV lock  - Monitor electrolytes with daily BMP    Hematologic:   - VTE prophylaxis  - Lovenox    Infectious Disease:   - monitor for fevers  - bacitracin ointment on ear    Lines/Tubes:  - PIV  - a-line  - baptiste catheter    - d/c arterial line  - d/c baptiste    Endocrine:   - DM a1c 7.8  - FS < 180  - ISS  - resume pre-meals after tolerating PO  - hypothyroidism, start PO synthroid 75 mcg    Disposition:   List to floor

## 2021-11-07 NOTE — PROGRESS NOTE ADULT - SUBJECTIVE AND OBJECTIVE BOX
SICU Daily Progress Note  =====================================================  24 HOUR EVENTS:  - home HTN meds restarted  - no acute overnight events     72yo F w/ pmh inclusive of htn, hcl, DM, CKD, breast ca, being treated outpt by ENT Dr. Gil for left ear malignancy, transferred from [facility] for ENT consult of Left ear infection. Pt was admitted at [facility] on 10/24 after experiencing syncopal episode and after extensive workup there, is now transferred here for ENT mngmt of Left ear malignancy as pt's ENT is out of [facility]. Pt reporting increased "burning" pain of left ear w/ increased foul smelling drainage from left ear lesion. No recent fever/chills, No photophobia/eye pain/changes in vision, no sore throat/dysphagia, No chest pain/palpitations, no SOB/cough/wheeze/stridor, No abdominal pain, No N/V/D, no dysuria/frequency/discharge, No neck/back pain, no rash, no new focal neuro symptoms.       MEDICATIONS:   -------------------------------------------------------------------------------------    Cardiovascular Medications  hydrALAZINE 50 milliGRAM(s) Oral three times a day    Gastrointestinal Medications  sodium bicarbonate 325 milliGRAM(s) Oral two times a day    Hematologic/Oncologic Medications  heparin   Injectable 5000 Unit(s) SubCutaneous every 8 hours  influenza  Vaccine (HIGH DOSE) 0.7 milliLiter(s) IntraMuscular once    Endocrine/Metabolic Medications  atorvastatin 40 milliGRAM(s) Oral at bedtime  dextrose 50% Injectable 25 Gram(s) IV Push once  dextrose 50% Injectable 12.5 Gram(s) IV Push once  dextrose 50% Injectable 25 Gram(s) IV Push once  insulin glargine Injectable (LANTUS) 20 Unit(s) SubCutaneous at bedtime  insulin lispro (ADMELOG) corrective regimen sliding scale   SubCutaneous Before meals and at bedtime  levothyroxine 75 MICROGram(s) Oral once    Topical/Other Medications  BACItracin   Ointment 1 Application(s) Topical two times a day    --------------------------------------------------------------------------------------    VITAL SIGNS, INS/OUTS (last 24 hours):  --------------------------------------------------------------------------------------  Vital Signs Last 24 Hrs  T(C): 36.8 (06 Nov 2021 20:00), Max: 37.2 (06 Nov 2021 08:00)  T(F): 98.2 (06 Nov 2021 20:00), Max: 99 (06 Nov 2021 08:00)  HR: 63 (06 Nov 2021 23:00) (63 - 86)  RR: 12 (06 Nov 2021 23:00) (11 - 17)  SpO2: 100% (06 Nov 2021 23:00) (91% - 100%)      05 Nov 2021 07:01  -  06 Nov 2021 07:00  --------------------------------------------------------  IN:    Lactated Ringers: 650 mL    Lactated Ringers: 700 mL  Total IN: 1350 mL    OUT:    Bulb (mL): 80 mL    Bulb (mL): 50 mL    Indwelling Catheter - Urethral (mL): 2945 mL  Total OUT: 3075 mL    Total NET: -1725 mL      06 Nov 2021 08:01  -  07 Nov 2021 00:28  --------------------------------------------------------  IN:    IV PiggyBack: 200 mL    Lactated Ringers: 250 mL    Oral Fluid: 300 mL  Total IN: 750 mL    OUT:    Bulb (mL): 20 mL    Bulb (mL): 10 mL    Indwelling Catheter - Urethral (mL): 1155 mL  Total OUT: 1185 mL    Total NET: -435 mL        --------------------------------------------------------------------------------------    EXAM  NEUROLOGY  Exam: Normal, NAD     HEENT  Exam: Normocephalic, atraumatic,    - L ear incision C/D/I    RESPIRATORY  Exam: nonlabored respirations     CARDIOVASCULAR  Exam: RRR on monitor      HEMATOLOGIC  [x] VTE Prophylaxis: heparin   Injectable 5000 Unit(s) SubCutaneous every 8 hours      LABS  --------------------------------------------------------------------------------------    LABS:  cret                        8.8    13.69 )-----------( 214      ( 06 Nov 2021 01:14 )             28.5     11-06    138  |  108<H>  |  50<H>  ----------------------------<  196<H>  4.3   |  20<L>  |  1.99<H>    Ca    8.8      06 Nov 2021 01:14  Phos  3.7     11-06  Mg     2.10     11-06       SICU Daily Progress Note  =====================================================  24 HOUR EVENTS:  - home HTN meds restarted  - no acute overnight events   - list to floors    72yo F w/ pmh inclusive of htn, hcl, DM, CKD, breast ca, being treated outpt by ENT Dr. Gil for left ear malignancy, transferred from [facility] for ENT consult of Left ear infection. Pt was admitted at [facility] on 10/24 after experiencing syncopal episode and after extensive workup there, is now transferred here for ENT mngmt of Left ear malignancy as pt's ENT is out of [facility]. Pt reporting increased "burning" pain of left ear w/ increased foul smelling drainage from left ear lesion. No recent fever/chills, No photophobia/eye pain/changes in vision, no sore throat/dysphagia, No chest pain/palpitations, no SOB/cough/wheeze/stridor, No abdominal pain, No N/V/D, no dysuria/frequency/discharge, No neck/back pain, no rash, no new focal neuro symptoms.       MEDICATIONS:   -------------------------------------------------------------------------------------    Cardiovascular Medications  hydrALAZINE 50 milliGRAM(s) Oral three times a day    Gastrointestinal Medications  sodium bicarbonate 325 milliGRAM(s) Oral two times a day    Hematologic/Oncologic Medications  heparin   Injectable 5000 Unit(s) SubCutaneous every 8 hours  influenza  Vaccine (HIGH DOSE) 0.7 milliLiter(s) IntraMuscular once    Endocrine/Metabolic Medications  atorvastatin 40 milliGRAM(s) Oral at bedtime  dextrose 50% Injectable 25 Gram(s) IV Push once  dextrose 50% Injectable 12.5 Gram(s) IV Push once  dextrose 50% Injectable 25 Gram(s) IV Push once  insulin glargine Injectable (LANTUS) 20 Unit(s) SubCutaneous at bedtime  insulin lispro (ADMELOG) corrective regimen sliding scale   SubCutaneous Before meals and at bedtime  levothyroxine 75 MICROGram(s) Oral once    Topical/Other Medications  BACItracin   Ointment 1 Application(s) Topical two times a day    --------------------------------------------------------------------------------------    VITAL SIGNS, INS/OUTS (last 24 hours):  --------------------------------------------------------------------------------------  ICU Vital Signs Last 24 Hrs  T(C): 36.8 (07 Nov 2021 09:00), Max: 36.9 (07 Nov 2021 00:00)  T(F): 98.3 (07 Nov 2021 09:00), Max: 98.4 (07 Nov 2021 00:00)  HR: 77 (07 Nov 2021 09:00) (58 - 78)  BP: --  BP(mean): --  ABP: 168/68 (07 Nov 2021 09:00) (125/50 - 174/65)  ABP(mean): 107 (07 Nov 2021 09:00) (77 - 110)  RR: 18 (07 Nov 2021 09:00) (9 - 18)  SpO2: 97% (07 Nov 2021 09:00) (97% - 100%)    I&O's Detail    06 Nov 2021 08:01  -  07 Nov 2021 07:00  --------------------------------------------------------  IN:    IV PiggyBack: 200 mL    Lactated Ringers: 250 mL    Oral Fluid: 300 mL  Total IN: 750 mL    OUT:    Bulb (mL): 20 mL    Bulb (mL): 10 mL    Indwelling Catheter - Urethral (mL): 1605 mL  Total OUT: 1635 mL    Total NET: -885 mL      07 Nov 2021 07:01  -  07 Nov 2021 10:36  --------------------------------------------------------  IN:    Oral Fluid: 250 mL  Total IN: 250 mL    OUT:    Indwelling Catheter - Urethral (mL): 240 mL  Total OUT: 240 mL    Total NET: 10 mL  --------------------------------------------------------------------------------------    EXAM  NEUROLOGY  Exam: Normal, NAD, AAOx3,   - no focal neurologic deficits  - moves all extremities spontaneously  - appropriate    HEENT  Exam: Normocephalic, atraumatic,    - L ear incision C/D/I    RESPIRATORY  Exam: nonlabored respirations     CARDIOVASCULAR  Exam: RRR on monitor      HEMATOLOGIC  [x] VTE Prophylaxis: heparin   Injectable 5000 Unit(s) SubCutaneous every 8 hours      LABS  --------------------------------------------------------------------------------------    LABS:                        8.3    11.85 )-----------( 220      ( 07 Nov 2021 02:35 )             27.4     11-07    140  |  107  |  57<H>  ----------------------------<  126<H>  4.6   |  22  |  2.15<H>    Ca    8.4      07 Nov 2021 02:35  Phos  4.1     11-07  Mg     2.20     11-07

## 2021-11-07 NOTE — PROGRESS NOTE ADULT - SUBJECTIVE AND OBJECTIVE BOX
HPI: 70yo F w/ pmh inclusive of htn, hcl, DM, CKD, breast ca, being treated outpt by ENT Dr. Gil for left ear malignancy, transferred from [facility] for ENT consult of Left ear infection. Pt was admitted at [facility] on 10/24 after experiencing syncopal episode and after extensive workup there, is now transferred here for ENT mngmt of Left ear malignancy. S/p L temporal bone resection with Dr. Park and Dr. Gil 11/3.    Remains extubated on RA  Awake and alert this AM during rounds    Vital Signs Last 24 Hrs  T(C): 36.8 (07 Nov 2021 09:00), Max: 36.9 (07 Nov 2021 00:00)  T(F): 98.3 (07 Nov 2021 09:00), Max: 98.4 (07 Nov 2021 00:00)  HR: 77 (07 Nov 2021 09:00) (58 - 86)  BP: --  BP(mean): --  RR: 18 (07 Nov 2021 09:00) (9 - 18)  SpO2: 97% (07 Nov 2021 09:00) (97% - 100%)    Physical Exam:   General: NAD  Respiratory: No respiratory distress, stridor, or stertor  OU:  EOMI  AD: Pinna wnl, EAC clear, TM intact, no effusion  AS: left supraclav flap in place; skin mildly erythematous; incisions c/d/i  Nose: ulcerating lesion on left skin of nare; s/p biopsy, small clot/scabbing of lesion  OC/OP: tongue normal, floor of mouth WNL, no masses or lesions, OP clear  Neck: soft/flat, no LAD    A/P: 71yFemale w/ pmh inclusive of htn, hcl, DM, CKD, breast ca, and fungating ear mass admitted for preop workup and clearance prior to left temporal bone resection on 11/3 with Dr. Park and Dr. Gil    - will leave NAMRATA in for now; please have strict I/O recorded  - OK for floor  - flap checks as per PRS  - f/u surgical path  - closely monitor for hyperkalemia and acidosis  - monitor mental status  - electrolyte repletions/management  - sqh, SCDs

## 2021-11-08 ENCOUNTER — TRANSCRIPTION ENCOUNTER (OUTPATIENT)
Age: 71
End: 2021-11-08

## 2021-11-08 LAB
ANION GAP SERPL CALC-SCNC: 10 MMOL/L — SIGNIFICANT CHANGE UP (ref 7–14)
BUN SERPL-MCNC: 56 MG/DL — HIGH (ref 7–23)
CALCIUM SERPL-MCNC: 8.8 MG/DL — SIGNIFICANT CHANGE UP (ref 8.4–10.5)
CHLORIDE SERPL-SCNC: 107 MMOL/L — SIGNIFICANT CHANGE UP (ref 98–107)
CO2 SERPL-SCNC: 21 MMOL/L — LOW (ref 22–31)
CREAT SERPL-MCNC: 2.12 MG/DL — HIGH (ref 0.5–1.3)
GLUCOSE BLDC GLUCOMTR-MCNC: 127 MG/DL — HIGH (ref 70–99)
GLUCOSE BLDC GLUCOMTR-MCNC: 138 MG/DL — HIGH (ref 70–99)
GLUCOSE BLDC GLUCOMTR-MCNC: 155 MG/DL — HIGH (ref 70–99)
GLUCOSE BLDC GLUCOMTR-MCNC: 202 MG/DL — HIGH (ref 70–99)
GLUCOSE SERPL-MCNC: 130 MG/DL — HIGH (ref 70–99)
HCT VFR BLD CALC: 29.2 % — LOW (ref 34.5–45)
HGB BLD-MCNC: 8.7 G/DL — LOW (ref 11.5–15.5)
MAGNESIUM SERPL-MCNC: 2 MG/DL — SIGNIFICANT CHANGE UP (ref 1.6–2.6)
MCHC RBC-ENTMCNC: 28.2 PG — SIGNIFICANT CHANGE UP (ref 27–34)
MCHC RBC-ENTMCNC: 29.8 GM/DL — LOW (ref 32–36)
MCV RBC AUTO: 94.8 FL — SIGNIFICANT CHANGE UP (ref 80–100)
NRBC # BLD: 0 /100 WBCS — SIGNIFICANT CHANGE UP
NRBC # FLD: 0 K/UL — SIGNIFICANT CHANGE UP
PHOSPHATE SERPL-MCNC: 2.7 MG/DL — SIGNIFICANT CHANGE UP (ref 2.5–4.5)
PLATELET # BLD AUTO: 227 K/UL — SIGNIFICANT CHANGE UP (ref 150–400)
POTASSIUM SERPL-MCNC: 4.5 MMOL/L — SIGNIFICANT CHANGE UP (ref 3.5–5.3)
POTASSIUM SERPL-SCNC: 4.5 MMOL/L — SIGNIFICANT CHANGE UP (ref 3.5–5.3)
RBC # BLD: 3.08 M/UL — LOW (ref 3.8–5.2)
RBC # FLD: 14.2 % — SIGNIFICANT CHANGE UP (ref 10.3–14.5)
SODIUM SERPL-SCNC: 138 MMOL/L — SIGNIFICANT CHANGE UP (ref 135–145)
WBC # BLD: 10.07 K/UL — SIGNIFICANT CHANGE UP (ref 3.8–10.5)
WBC # FLD AUTO: 10.07 K/UL — SIGNIFICANT CHANGE UP (ref 3.8–10.5)

## 2021-11-08 PROCEDURE — 99232 SBSQ HOSP IP/OBS MODERATE 35: CPT

## 2021-11-08 PROCEDURE — 99233 SBSQ HOSP IP/OBS HIGH 50: CPT | Mod: GC

## 2021-11-08 RX ORDER — METOPROLOL TARTRATE 50 MG
5 TABLET ORAL ONCE
Refills: 0 | Status: COMPLETED | OUTPATIENT
Start: 2021-11-08 | End: 2021-11-08

## 2021-11-08 RX ORDER — OXYCODONE HYDROCHLORIDE 5 MG/1
1 TABLET ORAL
Qty: 16 | Refills: 0
Start: 2021-11-08 | End: 2021-11-11

## 2021-11-08 RX ORDER — SODIUM CHLORIDE 9 MG/ML
4 INJECTION INTRAMUSCULAR; INTRAVENOUS; SUBCUTANEOUS
Refills: 0 | Status: DISCONTINUED | OUTPATIENT
Start: 2021-11-08 | End: 2021-11-11

## 2021-11-08 RX ORDER — BACITRACIN ZINC 500 UNIT/G
1 OINTMENT IN PACKET (EA) TOPICAL
Qty: 1 | Refills: 0
Start: 2021-11-08 | End: 2021-11-17

## 2021-11-08 RX ORDER — LEVOTHYROXINE SODIUM 125 MCG
1 TABLET ORAL
Qty: 0 | Refills: 0 | DISCHARGE
Start: 2021-11-08

## 2021-11-08 RX ORDER — HYDRALAZINE HCL 50 MG
5 TABLET ORAL ONCE
Refills: 0 | Status: COMPLETED | OUTPATIENT
Start: 2021-11-08 | End: 2021-11-08

## 2021-11-08 RX ORDER — ACETYLCYSTEINE 200 MG/ML
4 VIAL (ML) MISCELLANEOUS THREE TIMES A DAY
Refills: 0 | Status: DISCONTINUED | OUTPATIENT
Start: 2021-11-08 | End: 2021-11-11

## 2021-11-08 RX ORDER — HYDRALAZINE HCL 50 MG
75 TABLET ORAL THREE TIMES A DAY
Refills: 0 | Status: DISCONTINUED | OUTPATIENT
Start: 2021-11-08 | End: 2021-11-09

## 2021-11-08 RX ORDER — HYDRALAZINE HCL 50 MG
1 TABLET ORAL
Qty: 0 | Refills: 0 | DISCHARGE
Start: 2021-11-08

## 2021-11-08 RX ORDER — LEVOTHYROXINE SODIUM 125 MCG
75 TABLET ORAL DAILY
Refills: 0 | Status: DISCONTINUED | OUTPATIENT
Start: 2021-11-08 | End: 2021-11-11

## 2021-11-08 RX ORDER — ATORVASTATIN CALCIUM 80 MG/1
1 TABLET, FILM COATED ORAL
Qty: 0 | Refills: 0 | DISCHARGE
Start: 2021-11-08

## 2021-11-08 RX ADMIN — Medication 5 MILLIGRAM(S): at 07:32

## 2021-11-08 RX ADMIN — Medication 400 MILLIGRAM(S): at 10:49

## 2021-11-08 RX ADMIN — INSULIN GLARGINE 20 UNIT(S): 100 INJECTION, SOLUTION SUBCUTANEOUS at 22:20

## 2021-11-08 RX ADMIN — Medication 650 MILLIGRAM(S): at 03:30

## 2021-11-08 RX ADMIN — Medication 400 MILLIGRAM(S): at 15:34

## 2021-11-08 RX ADMIN — Medication 50 MILLIGRAM(S): at 05:45

## 2021-11-08 RX ADMIN — Medication 325 MILLIGRAM(S): at 19:00

## 2021-11-08 RX ADMIN — Medication 325 MILLIGRAM(S): at 05:48

## 2021-11-08 RX ADMIN — Medication 1 APPLICATION(S): at 05:45

## 2021-11-08 RX ADMIN — Medication 5 MILLIGRAM(S): at 23:44

## 2021-11-08 RX ADMIN — Medication 650 MILLIGRAM(S): at 02:46

## 2021-11-08 RX ADMIN — Medication 1: at 18:22

## 2021-11-08 RX ADMIN — SODIUM CHLORIDE 4 MILLILITER(S): 9 INJECTION INTRAMUSCULAR; INTRAVENOUS; SUBCUTANEOUS at 05:31

## 2021-11-08 RX ADMIN — SODIUM CHLORIDE 4 MILLILITER(S): 9 INJECTION INTRAMUSCULAR; INTRAVENOUS; SUBCUTANEOUS at 00:55

## 2021-11-08 RX ADMIN — Medication 1 APPLICATION(S): at 18:23

## 2021-11-08 RX ADMIN — Medication 5 MILLIGRAM(S): at 12:30

## 2021-11-08 RX ADMIN — Medication 75 MILLIGRAM(S): at 22:29

## 2021-11-08 RX ADMIN — Medication 650 MILLIGRAM(S): at 23:20

## 2021-11-08 RX ADMIN — Medication 2: at 12:30

## 2021-11-08 RX ADMIN — ENOXAPARIN SODIUM 40 MILLIGRAM(S): 100 INJECTION SUBCUTANEOUS at 10:49

## 2021-11-08 RX ADMIN — ATORVASTATIN CALCIUM 40 MILLIGRAM(S): 80 TABLET, FILM COATED ORAL at 22:28

## 2021-11-08 RX ADMIN — Medication 75 MICROGRAM(S): at 07:31

## 2021-11-08 RX ADMIN — Medication 50 MILLIGRAM(S): at 13:36

## 2021-11-08 NOTE — PROGRESS NOTE ADULT - PROBLEM SELECTOR PLAN 1
Pt. with advanced CKD stage 4 in setting of longstanding DM and HTN. On review of labs on [facility]IE/[place], patient noted to have elevated Scr of 1.57 on 9/24/15. Scr progressively increased over the years and was 2.17 on 9/16/19. on admission Scr was 2.45 on 10/29/21. Scr elevated/stable at 3.01 on 11/2/21. Scr was at 2.69 on 11/3/21. Scr is at 2.12 today. Pt. underwent Left temporal resection on 11/3/21. Encourage PO intake. Monitor labs and urine output. Avoid any potential nephrotoxins. Discontinue ACE-I. Dose meds as per eGFR.

## 2021-11-08 NOTE — PROGRESS NOTE ADULT - SUBJECTIVE AND OBJECTIVE BOX
[facility] DIVISION OF KIDNEY DISEASES AND HYPERTENSION -- FOLLOW UP NOTE  --------------------------------------------------------------------------------  HPI: 71-year-old female with longstanding history of DM (>40 years), HTN, breast cancer, left ear malignancy and CKD was initially admitted to [facility] on 10/24/21 for L ear infection, then transferred to [facility] on 10/29/21 for further ENT management. Planned for lateral temporal bone resection by ENT team. Nephrology consulted for CKD management. [facility] patient noted to have elevated Scr of 1.57 on 9/24/15. Scr progressively increased over the years and was 2.17 on 9/16/19. Scr was 2.63 on 10/29/21 and it increased to 3.01 on 11/2/21. Scr is at 2.12 today. Pt. has been aware of her kidney disease and saw Dr. Dk Rose few months ago as outpatient in [place], [place]. Pt. underwent L auriculectomy, left lateral temporal bone resection, with parotidectomy on 11/3/21. Pt. was transferred to SICU intubated on 11/4/21. Pt. self extubated later in the evening.     Pt. seen and examined today. Pt. reports pain at the incision site. Denies SOB, CP, HA, N/V, abdominal pain, or urinary symptoms or dizziness.     PAST HISTORY  --------------------------------------------------------------------------------  No significant changes to PMH, PSH, FHx, SHx, unless otherwise noted    ALLERGIES & MEDICATIONS  --------------------------------------------------------------------------------  Allergies    No Known Allergies    Intolerances    Standing Inpatient Medications  atorvastatin 40 milliGRAM(s) Oral at bedtime  BACItracin   Ointment 1 Application(s) Topical two times a day  dextrose 50% Injectable 25 Gram(s) IV Push once  dextrose 50% Injectable 12.5 Gram(s) IV Push once  dextrose 50% Injectable 25 Gram(s) IV Push once  enoxaparin Injectable 40 milliGRAM(s) SubCutaneous daily  hydrALAZINE 50 milliGRAM(s) Oral three times a day  influenza  Vaccine (HIGH DOSE) 0.7 milliLiter(s) IntraMuscular once  insulin glargine Injectable (LANTUS) 20 Unit(s) SubCutaneous at bedtime  insulin lispro (ADMELOG) corrective regimen sliding scale   SubCutaneous Before meals and at bedtime  levothyroxine 75 MICROGram(s) Oral daily  sodium bicarbonate 325 milliGRAM(s) Oral two times a day    PRN Inpatient Medications  acetaminophen     Tablet .. 650 milliGRAM(s) Oral every 6 hours PRN  acetylcysteine 20%  Inhalation 4 milliLiter(s) Inhalation three times a day PRN  ibuprofen  Tablet. 400 milliGRAM(s) Oral every 6 hours PRN  oxyCODONE    IR 5 milliGRAM(s) Oral every 6 hours PRN  sodium chloride 3%  Inhalation 4 milliLiter(s) Inhalation two times a day PRN    REVIEW OF SYSTEMS  --------------------------------------------------------------------------------  Gen: No fevers , sore throat +  Respiratory: No dyspnea  CV: No chest pain  GI: No abdominal pain  : No dysuria  MSK: No  edema  Neuro: no dizziness     VITALS/PHYSICAL EXAM  --------------------------------------------------------------------------------  T(C): 36.7 (11-08-21 @ 05:45), Max: 36.9 (11-07-21 @ 12:00)  HR: 68 (11-08-21 @ 05:45) (60 - 79)  BP: 174/72 (11-08-21 @ 06:44) (151/59 - 185/71)  RR: 19 (11-08-21 @ 05:45) (15 - 19)  SpO2: 100% (11-08-21 @ 05:45) (95% - 100%)  Wt(kg): --    11-07-21 @ 07:01  -  11-08-21 @ 07:00  --------------------------------------------------------  IN: 1520 mL / OUT: 2202.5 mL / NET: -682.5 mL    Physical Exam:  	Gen: NAD, appears lethargic  	HEENT: MMM, Left temporal and neck scar evident  	Pulm: CTA B/L, no crackles  	CV: S1S2  	Abd: Soft, +BS   	Ext: No LE edema B/L  	Neuro: Awake  	Skin: Warm and dry    LABS/STUDIES  --------------------------------------------------------------------------------              8.7    10.07 >-----------<  227      [11-08-21 @ 07:23]              29.2     138  |  107  |  56  ----------------------------<  130      [11-08-21 @ 07:23]  4.5   |  21  |  2.12        Ca     8.8     [11-08-21 @ 07:23]      Mg     2.00     [11-08-21 @ 07:23]      Phos  2.7     [11-08-21 @ 07:23]    Creatinine Trend:  SCr 2.12 [11-08 @ 07:23]  SCr 2.15 [11-07 @ 02:35]  SCr 1.99 [11-06 @ 01:14]  SCr 2.17 [11-05 @ 01:46]  SCr 2.40 [11-04 @ 11:12]

## 2021-11-08 NOTE — PROGRESS NOTE ADULT - ASSESSMENT
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 Pt was admitted at [facility] on 10/24 after experiencing syncopal episode and after extensive workup there, is now transferred here for further management. Pt reporting increased "burning" pain of left ear w/ increased foul smelling drainage from left ear lesion since last few months. Wound cultures from [facility] noted, (collected 10/25), with proteus mirabilis and pseudomonas aeruginosa.    Wound cultures from [facility] noted, (collected 10/25), with proteus mirabilis and pseudomonas aeruginosa.   From HIE: Wound cx from 8/30/21: Pan sensitive pseudomonas    IMPRESSION  Fungating Left Ear SCC with concern for superinfection   Pseudomonas and proteus infection, polymicrobial infection.   s/p surgery   leucocytosis, likely reactive post procedure. resolved         RECOMMENDATION  - monitoring off abx   - trend cbc for leucocytosis, resolved   - Blood cx if pt spikes fever  - AMS, ? cefepime induced, improved     Will sign off, please call with questions.     Tan Mariano  Pager: [phone]. If no response or past 5 pm call [phone].

## 2021-11-08 NOTE — DISCHARGE NOTE PROVIDER - HOSPITAL COURSE
S/P temporal bone resection. Patient was observed in SICU then transferred to [address] [place] [address]. Started on oral diet. Physical therapy eval done. Rehab was recommended. patient discharged on 11/8/21. S/P temporal bone resection. Patient was observed in SICU then transferred to [address] [place] [address]. Started on oral diet. Physical therapy eval done. Rehab was recommended. patient discharged on 11/9/21. S/P temporal bone resection. Patient was observed in SICU then transferred to [address] [place] [address]. Started on oral diet. Physical therapy eval done. Rehab was recommended. NAMRATA drains were removed, Sutures were removed patient discharged on 11/11/21 to rehab.

## 2021-11-08 NOTE — PROGRESS NOTE ADULT - ATTENDING COMMENTS
I agree with the detailed interval history, physical, and plan, which I have reviewed and edited where appropriate'; also agree with notes/assessment with my team on service.  I have personally examined the patient.  I was physically present for the key portions of the evaluation and management (E/M) service provided.  I reviewed all the pertinent data.  The patient is a critical care patient with life threatening hemodynamic and metabolic instability in SICU.  The SICU team has a constant risk benefit analyzes discussion and coordinating care with the primary team and all consultants.   The patient is in SICU with the chief complaint and diagnosis mentioned in the note.   The plan will be specified in the note.  70yo s/p L auriculectomy, left lateral temporal bone resection, with parotidectomy, and L SND 2-5 removed en bloc in SICU  AAOx2, Follows commands,   RESPIRATORY  RR: 12   SpO2: 97%   CARDIOVASCULAR  HR: 70   BP: 161/61  Exam: RRR,   GI/NUTRITION  Exam: Soft, NT, ND    PLAN:    Neurologic: delirium  - PO tylenol   Respiratory: respiratory abnormality/ZOILA  -Room air  Cardiovascular:   -hydralizine 50mg TID  -atorvastatin  Gastrointestinal/Nutrition:   - Reg diet  Renal/Genitourinary:   - IV lock  Hematologic:   -SubQ heparin Q8h  Infectious Disease:   - D/C cefepime  -Endocrine:   - ISS  Disposition: SICU
I agree with the detailed interval history, physical, and plan, which I have reviewed and edited where appropriate'; also agree with notes/assessment with my team on service.  I have personally examined the patient.  I was physically present for the key portions of the evaluation and management (E/M) service provided.  I reviewed all the pertinent data.  The patient is a critical care patient with life threatening hemodynamic and metabolic instability in SICU.  The SICU team has a constant risk benefit analyzes discussion and coordinating care with the primary team and all consultants.   The patient is in SICU with the chief complaint and diagnosis mentioned in the note.   The plan will be specified in the note.  70yo F  s/p L auriculectomy, left lateral temporal bone resection, with parotidectomy, and L SND 2-5 removed en bloc. Reconstructed with L supraclavicular flap in SICU  RASS:   0  GCS:   14  CAM ICU: Negative  Exam: AOx1, Not following commands, nods yes or no to questions  RESPIRATORY  RR: 17   SpO2: 98%   coarse bs  CARDIOVASCULAR  HR: 93   BP: 145/84   RR  ABD; soft nt nd    PLAN:     Neurologic: delirium  - IV tylenol PRN Q6 for pain  -CT Head  Respiratory: respiratory abnormality  room air  Cardiovascular:   -hydralizine 50mg TID  -Gastrointestinal/Nutrition:   - NPO  Renal/Genitourinary:   - LR @ 50/hr  Hematologic:   - SubQ heparin Q8h  Infectious Disease:   - c/w cefepime  -Endocrine:   - ISS  -levothyroxine 60mg at bedtime    Disposition: SICU
ventilatory insufficiency following surgery, airway intact and tolerating natural respirations  may transfer to regular [facility] for ongoing care by primary surgical team
CKD 4  Anemia of chronic disease
Hyperkalemia  CKD 4  Metabolic Acidosis  Uncontrolled HTN    Please correct underlying acidosis to help improve potassium. Cont oral bicarbonate. Monitor labs closely.   Would start oral nifedipine to help improve BP. Monitor closely.
Pt. with advanced CKD stage 4. Scr elevated at 3 today. Assessment and plan as outlined above. Monitor labs and urine output. Avoid any potential nephrotoxins. Dose medications as per eGFR. Removal of underlying infection would be beneficial in recovery of overall renal function to baseline. Please maintain MAP > 60.
Pt. with advanced CKD stage 4. Scr elevated/stable at 2.84 today. Assessment and plan as outlined above. Monitor labs and urine output. Avoid any potential nephrotoxins. Dose medications as per eGFR.
CKD 4  Metabolic Acidosis  Uncontrolled HTN  Hyperkalemia    Cont oral bicarbonate. Monitor labs closely. Cont current BP meds. Monitor closely.
Hyperkalemia  CKD 4  Metabolic Acidosis    Please correct underlying acidosis to help improve potassium. Monitor labs closely, restart sodium bicarbonate which will help.

## 2021-11-08 NOTE — PROGRESS NOTE ADULT - PROBLEM SELECTOR PLAN 2
Pt. with metabolic acidosis in setting of CKD. Started on oral sodium bicarbonate 325 mg PO BID. serum sodium bicarb is at 21 today. Monitor serum CO2.    If you have any questions, please feel free to contact me  Kimani Neff  Nephrology Fellow  [phone]  (After 5pm or on weekends please page the on-call fellow)

## 2021-11-08 NOTE — DISCHARGE NOTE PROVIDER - NSDCMRMEDTOKEN_GEN_ALL_CORE_FT
atorvastatin 40 mg oral tablet: 1 tab(s) orally once a day (at bedtime)  bacitracin 500 units/g topical ointment: 1 application topically 2 times a day  hydrALAZINE 50 mg oral tablet: 1 tab(s) orally 3 times a day  levothyroxine 75 mcg (0.075 mg) oral tablet: 1 tab(s) orally once a day  oxyCODONE 5 mg oral tablet: 1 tab(s) orally every 6 hours, As needed, Severe Pain (7 - 10) MDD:4   amLODIPine 10 mg oral tablet: 1 tab(s) orally once a day  atorvastatin 40 mg oral tablet: 1 tab(s) orally once a day (at bedtime)  bacitracin 500 units/g topical ointment: 1 application topically 2 times a day  hydrALAZINE 100 mg oral tablet: 1 tab(s) orally every 8 hours  levothyroxine 75 mcg (0.075 mg) oral tablet: 1 tab(s) orally once a day  oxyCODONE 5 mg oral tablet: 1 tab(s) orally every 6 hours, As needed, Severe Pain (7 - 10) MDD:4   amLODIPine 10 mg oral tablet: 1 tab(s) orally once a day  atorvastatin 40 mg oral tablet: 1 tab(s) orally once a day (at bedtime)  bacitracin 500 units/g topical ointment: 1 application topically 2 times a day  hydrALAZINE 100 mg oral tablet: 1 tab(s) orally every 8 hours  Lantus 100 units/mL subcutaneous solution: 20  subcutaneous once (at bedtime)  levothyroxine 75 mcg (0.075 mg) oral tablet: 1 tab(s) orally once a day  oxyCODONE 5 mg oral tablet: 1 tab(s) orally every 6 hours, As needed, Severe Pain (7 - 10) MDD:4  senna oral tablet: 2 tab(s) orally once a day (at bedtime)

## 2021-11-08 NOTE — PROGRESS NOTE ADULT - PROBLEM SELECTOR PLAN 3
uncontrolled   - will inc hydralazine to 75 mg tid   - continue to hold lisinopril (given CKD and episode of hyperkalemia) as per renal  - goal SBP <130 given diabetes, but could consider more lenient BP control given age and reported history of falls  - current SBP in acceptable range

## 2021-11-08 NOTE — PROGRESS NOTE ADULT - ASSESSMENT
71-year-old female with longstanding history of DM (>40 years), HTN, breast cancer, left ear malignancy and CKD was initially admitted to [facility] on 10/24/21 for L ear infection, then transferred to [facility] on 10/29/21 for further ENT management. Planned for lateral temporal bone resection by ENT team. Pt underwent L auriculectomy, left lateral temporal bone resection, with parotidectomy on 11/3/21. Pt. was transferred to SICU intubated on 11/4/21. Pt. self extubated later in the evening and tx to floors, found with uncontrolled hypertension

## 2021-11-08 NOTE — DISCHARGE NOTE PROVIDER - NSDCCPCAREPLAN_GEN_ALL_CORE_FT
PRINCIPAL DISCHARGE DIAGNOSIS  Diagnosis: Lesion of left ear  Assessment and Plan of Treatment:

## 2021-11-08 NOTE — PROGRESS NOTE ADULT - SUBJECTIVE AND OBJECTIVE BOX
HPI: 70yo F w/ pmh inclusive of htn, hcl, DM, CKD, breast ca S/p L temporal bone resection with Dr. Park and Dr. Gil 11/3.    Remains extubated on RA, doing well. pain well controlled.   Awake and alert this AM during rounds      Vital Signs Last 24 Hrs  T(C): 36.7 (08 Nov 2021 05:45), Max: 36.9 (07 Nov 2021 12:00)  T(F): 98.1 (08 Nov 2021 05:45), Max: 98.4 (07 Nov 2021 12:00)  HR: 68 (08 Nov 2021 05:45) (60 - 79)  BP: 174/72 (08 Nov 2021 06:44) (151/59 - 185/71)  BP(mean): 82 (07 Nov 2021 17:00) (82 - 101)  RR: 19 (08 Nov 2021 05:45) (12 - 19)  SpO2: 100% (08 Nov 2021 05:45) (95% - 100%)  Physical Exam:   General: NAD  Respiratory: No respiratory distress, stridor, or stertor  OU:  EOMI  AD: Pinna wnl, EAC clear, TM intact, no effusion  AS: left supraclav flap in place; skin mildly erythematous; incisions c/d/i  Nose: ulcerating lesion on left skin of nare; s/p biopsy, small clot/scabbing of lesion  OC/OP: tongue normal, floor of mouth WNL, no masses or lesions, OP clear  Neck: soft/flat, no LAD    A/P: 71yFemale w/ pmh inclusive of htn, hcl, DM, CKD, breast ca, and fungating ear mass admitted for preop workup and clearance prior to left temporal bone resection on 11/3 with Dr. Park and Dr. Gil    - continue recording NAMRATA drains  - f/u surgical path  - f/u renal recs  - restart synthroid  - monitor mental status  - electrolyte repletions/management  - sqh, SCDs

## 2021-11-08 NOTE — PROGRESS NOTE ADULT - SUBJECTIVE AND OBJECTIVE BOX
71y old  Female who presents with a chief complaint of Temporal bone resection  (08 Nov 2021 07:45)      Interval history:  Afebrile, pain in the neck, no abdominal pain.       Allergies:   No Known Allergies      Antimicrobials:      REVIEW OF SYSTEMS:  No chest pain   No diarrhea   No dysuria  No rash.       Vital Signs Last 24 Hrs  T(C): 36.8 (11-08-21 @ 13:15), Max: 36.9 (11-07-21 @ 19:14)  T(F): 98.2 (11-08-21 @ 13:15), Max: 98.4 (11-07-21 @ 19:14)  HR: 64 (11-08-21 @ 13:15) (60 - 79)  BP: 152/66 (11-08-21 @ 13:15) (152/66 - 180/71)  BP(mean): --  RR: 18 (11-08-21 @ 13:15) (18 - 19)  SpO2: 99% (11-08-21 @ 13:15) (95% - 100%)      PHYSICAL EXAM:  Patient in no acute distress. Alert, awake.   lt neck and shoulder sutures c/d/i  Cardiovascular: S1S2 normal.  Lungs: + air entry B/L lung fields.  Gastrointestinal: soft, nontender, nondistended.  Extremities: no edema.  IV sites not inflamed.                             8.7    10.07 )-----------( 227      ( 08 Nov 2021 07:23 )             29.2   11-08    138  |  107  |  56<H>  ----------------------------<  130<H>  4.5   |  21<L>  |  2.12<H>    Ca    8.8      08 Nov 2021 07:23  Phos  2.7     11-08  Mg     2.00     11-08        < from: CT Head No Cont (11.05.21 @ 22:17) >    IMPRESSION: No acute intracranial hemorrhage, mass effect, or shift of the midline structures.

## 2021-11-08 NOTE — DISCHARGE NOTE PROVIDER - CARE PROVIDER_API CALL
Noel Park)  Otolaryngology  [address] [place] [address]  Phone: [phone]  Fax: [phone]  Follow Up Time:

## 2021-11-08 NOTE — PROGRESS NOTE ADULT - SUBJECTIVE AND OBJECTIVE BOX
Patient is a 71y old  Female who presents with a chief complaint of Temporal bone resection  (08 Nov 2021 07:45)      SUBJECTIVE / OVERNIGHT EVENTS: called to re-eval pt for persistent high blood pressure - pt was previously followed by medicine, then went ti SICU postop now back on floors. she received lopressor 5mg IV x 2 today for SBP running in 170s, pressure improved to 150s. she is noted to have received pushes of IV BP meds throughout her hospital stay - her lisinopril was discontinued previously for renal insuffiencey - she denies any complaints except some pain at sx site      ROS:  No HA/DZ  No Vision changes   No CP, SOB  No N/V/D  No Edema  No Rash      MEDICATIONS  (STANDING):  atorvastatin 40 milliGRAM(s) Oral at bedtime  BACItracin   Ointment 1 Application(s) Topical two times a day  dextrose 50% Injectable 25 Gram(s) IV Push once  dextrose 50% Injectable 12.5 Gram(s) IV Push once  dextrose 50% Injectable 25 Gram(s) IV Push once  enoxaparin Injectable 40 milliGRAM(s) SubCutaneous daily  hydrALAZINE 50 milliGRAM(s) Oral three times a day  influenza  Vaccine (HIGH DOSE) 0.7 milliLiter(s) IntraMuscular once  insulin glargine Injectable (LANTUS) 20 Unit(s) SubCutaneous at bedtime  insulin lispro (ADMELOG) corrective regimen sliding scale   SubCutaneous Before meals and at bedtime  levothyroxine 75 MICROGram(s) Oral daily  sodium bicarbonate 325 milliGRAM(s) Oral two times a day    MEDICATIONS  (PRN):  acetaminophen     Tablet .. 650 milliGRAM(s) Oral every 6 hours PRN Mild Pain (1 - 3), Moderate Pain (4 - 6)  acetylcysteine 20%  Inhalation 4 milliLiter(s) Inhalation three times a day PRN mucus congestion  ibuprofen  Tablet. 400 milliGRAM(s) Oral every 6 hours PRN Mild Pain (1 - 3), Moderate Pain (4 - 6)  oxyCODONE    IR 5 milliGRAM(s) Oral every 6 hours PRN Severe Pain (7 - 10)  sodium chloride 3%  Inhalation 4 milliLiter(s) Inhalation two times a day PRN dry mucus      T(C): 36.8 (11-08-21 @ 13:15)  HR: 64 (11-08-21 @ 13:15)  BP: 152/66 (11-08-21 @ 13:15)  RR: 18 (11-08-21 @ 13:15)  SpO2: 99% (11-08-21 @ 13:15)  CAPILLARY BLOOD GLUCOSE      POCT Blood Glucose.: 202 mg/dL (08 Nov 2021 12:08)  POCT Blood Glucose.: 127 mg/dL (08 Nov 2021 08:25)  POCT Blood Glucose.: 189 mg/dL (07 Nov 2021 22:14)  POCT Blood Glucose.: 138 mg/dL (07 Nov 2021 16:57)    I&O's Summary    07 Nov 2021 07:01  -  08 Nov 2021 07:00  --------------------------------------------------------  IN: 1520 mL / OUT: 2202.5 mL / NET: -682.5 mL        PHYSICAL EXAM:  GENERAL: NAD, drowsy but easily arousable, answers questions   HEAD:  L temporal scar   EYES: EOMI, PERRL, conjunctiva and sclera clear  NECK: Supple, No JVD  CHEST/LUNG: Clear to auscultation bilaterally  HEART: Regular rate and rhythm; No murmurs, rubs, or gallops, No Edema  ABDOMEN: Soft, Nontender, Nondistended; Bowel sounds present  EXTREMITIES:  2+ Peripheral Pulses, No clubbing, cyanosis      LABS:                        8.7    10.07 )-----------( 227      ( 08 Nov 2021 07:23 )             29.2     11-08    138  |  107  |  56<H>  ----------------------------<  130<H>  4.5   |  21<L>  |  2.12<H>    Ca    8.8      08 Nov 2021 07:23  Phos  2.7     11-08  Mg     2.00     11-08                    RADIOLOGY & ADDITIONAL TESTS:    Imaging Personally Reviewed:    Consultant(s) Notes Reviewed:      Care Discussed with Consultants/Other Providers:

## 2021-11-08 NOTE — PROGRESS NOTE ADULT - PROBLEM SELECTOR PLAN 2
DM2 c/b hyperglycemia, on insulin therapy  - A1C 7.9 at [facility]  - c/w sliding scale insulin coverage  - pt was on Lantus 28 units which was reduced to 20U and admelog 10U tid premeals which have been discontinued   - goal FS <180 - pt currently at goal so cw current regimen and will adjust as needed

## 2021-11-09 LAB
ANION GAP SERPL CALC-SCNC: 9 MMOL/L — SIGNIFICANT CHANGE UP (ref 7–14)
BUN SERPL-MCNC: 51 MG/DL — HIGH (ref 7–23)
CALCIUM SERPL-MCNC: 8.9 MG/DL — SIGNIFICANT CHANGE UP (ref 8.4–10.5)
CHLORIDE SERPL-SCNC: 107 MMOL/L — SIGNIFICANT CHANGE UP (ref 98–107)
CO2 SERPL-SCNC: 25 MMOL/L — SIGNIFICANT CHANGE UP (ref 22–31)
CREAT SERPL-MCNC: 1.87 MG/DL — HIGH (ref 0.5–1.3)
GLUCOSE BLDC GLUCOMTR-MCNC: 118 MG/DL — HIGH (ref 70–99)
GLUCOSE BLDC GLUCOMTR-MCNC: 133 MG/DL — HIGH (ref 70–99)
GLUCOSE BLDC GLUCOMTR-MCNC: 140 MG/DL — HIGH (ref 70–99)
GLUCOSE BLDC GLUCOMTR-MCNC: 152 MG/DL — HIGH (ref 70–99)
GLUCOSE BLDC GLUCOMTR-MCNC: 198 MG/DL — HIGH (ref 70–99)
GLUCOSE SERPL-MCNC: 125 MG/DL — HIGH (ref 70–99)
HCT VFR BLD CALC: 30.3 % — LOW (ref 34.5–45)
HGB BLD-MCNC: 9.1 G/DL — LOW (ref 11.5–15.5)
MAGNESIUM SERPL-MCNC: 2 MG/DL — SIGNIFICANT CHANGE UP (ref 1.6–2.6)
MCHC RBC-ENTMCNC: 28.1 PG — SIGNIFICANT CHANGE UP (ref 27–34)
MCHC RBC-ENTMCNC: 30 GM/DL — LOW (ref 32–36)
MCV RBC AUTO: 93.5 FL — SIGNIFICANT CHANGE UP (ref 80–100)
NRBC # BLD: 0 /100 WBCS — SIGNIFICANT CHANGE UP
NRBC # FLD: 0 K/UL — SIGNIFICANT CHANGE UP
PHOSPHATE SERPL-MCNC: 2.6 MG/DL — SIGNIFICANT CHANGE UP (ref 2.5–4.5)
PLATELET # BLD AUTO: 238 K/UL — SIGNIFICANT CHANGE UP (ref 150–400)
POTASSIUM SERPL-MCNC: 4.5 MMOL/L — SIGNIFICANT CHANGE UP (ref 3.5–5.3)
POTASSIUM SERPL-SCNC: 4.5 MMOL/L — SIGNIFICANT CHANGE UP (ref 3.5–5.3)
RBC # BLD: 3.24 M/UL — LOW (ref 3.8–5.2)
RBC # FLD: 13.9 % — SIGNIFICANT CHANGE UP (ref 10.3–14.5)
SODIUM SERPL-SCNC: 141 MMOL/L — SIGNIFICANT CHANGE UP (ref 135–145)
SURGICAL PATHOLOGY STUDY: SIGNIFICANT CHANGE UP
TSH SERPL-MCNC: 1.21 UIU/ML — SIGNIFICANT CHANGE UP (ref 0.27–4.2)
WBC # BLD: 11.16 K/UL — HIGH (ref 3.8–10.5)
WBC # FLD AUTO: 11.16 K/UL — HIGH (ref 3.8–10.5)

## 2021-11-09 PROCEDURE — 99232 SBSQ HOSP IP/OBS MODERATE 35: CPT

## 2021-11-09 RX ORDER — METOPROLOL TARTRATE 50 MG
5 TABLET ORAL ONCE
Refills: 0 | Status: COMPLETED | OUTPATIENT
Start: 2021-11-09 | End: 2021-11-09

## 2021-11-09 RX ORDER — AMLODIPINE BESYLATE 2.5 MG/1
10 TABLET ORAL DAILY
Refills: 0 | Status: DISCONTINUED | OUTPATIENT
Start: 2021-11-09 | End: 2021-11-11

## 2021-11-09 RX ORDER — HYDRALAZINE HCL 50 MG
100 TABLET ORAL EVERY 8 HOURS
Refills: 0 | Status: DISCONTINUED | OUTPATIENT
Start: 2021-11-09 | End: 2021-11-11

## 2021-11-09 RX ORDER — HYDRALAZINE HCL 50 MG
1 TABLET ORAL
Qty: 0 | Refills: 0 | DISCHARGE
Start: 2021-11-09

## 2021-11-09 RX ORDER — AMLODIPINE BESYLATE 2.5 MG/1
1 TABLET ORAL
Qty: 0 | Refills: 0 | DISCHARGE
Start: 2021-11-09

## 2021-11-09 RX ADMIN — Medication 1 APPLICATION(S): at 18:00

## 2021-11-09 RX ADMIN — ATORVASTATIN CALCIUM 40 MILLIGRAM(S): 80 TABLET, FILM COATED ORAL at 22:03

## 2021-11-09 RX ADMIN — Medication 325 MILLIGRAM(S): at 06:02

## 2021-11-09 RX ADMIN — Medication 1: at 12:49

## 2021-11-09 RX ADMIN — Medication 325 MILLIGRAM(S): at 18:00

## 2021-11-09 RX ADMIN — Medication 100 MILLIGRAM(S): at 14:51

## 2021-11-09 RX ADMIN — SODIUM CHLORIDE 4 MILLILITER(S): 9 INJECTION INTRAMUSCULAR; INTRAVENOUS; SUBCUTANEOUS at 03:09

## 2021-11-09 RX ADMIN — Medication 100 MILLIGRAM(S): at 22:03

## 2021-11-09 RX ADMIN — Medication 75 MILLIGRAM(S): at 06:00

## 2021-11-09 RX ADMIN — Medication 5 MILLIGRAM(S): at 11:23

## 2021-11-09 RX ADMIN — Medication 1 APPLICATION(S): at 06:00

## 2021-11-09 RX ADMIN — OXYCODONE HYDROCHLORIDE 5 MILLIGRAM(S): 5 TABLET ORAL at 02:40

## 2021-11-09 RX ADMIN — ENOXAPARIN SODIUM 40 MILLIGRAM(S): 100 INJECTION SUBCUTANEOUS at 11:22

## 2021-11-09 RX ADMIN — Medication 75 MICROGRAM(S): at 06:00

## 2021-11-09 RX ADMIN — INSULIN GLARGINE 20 UNIT(S): 100 INJECTION, SOLUTION SUBCUTANEOUS at 22:04

## 2021-11-09 RX ADMIN — AMLODIPINE BESYLATE 10 MILLIGRAM(S): 2.5 TABLET ORAL at 10:21

## 2021-11-09 RX ADMIN — OXYCODONE HYDROCHLORIDE 5 MILLIGRAM(S): 5 TABLET ORAL at 01:44

## 2021-11-09 RX ADMIN — Medication 650 MILLIGRAM(S): at 00:00

## 2021-11-09 NOTE — PROGRESS NOTE ADULT - ASSESSMENT
71-year-old female with longstanding history of DM (>40 years), HTN, breast cancer, left ear malignancy and CKD was initially admitted to [facility] on 10/24/21 for L ear infection, then transferred to [facility] on 10/29/21 for further ENT management. Planned for lateral temporal bone resection by ENT team. Pt underwent L auriculectomy, left lateral temporal bone resection, with parotidectomy on 11/3/21. Pt. was transferred to SICU intubated on 11/4/21. Pt. self extubated later in the evening and tx to floors, found with uncontrolled hypertension

## 2021-11-09 NOTE — CHART NOTE - NSCHARTNOTEFT_GEN_A_CORE
Labs reviewed. Scr is at baseline.   Will sign off, please call us if any new concerns.     If you have any questions, please feel free to contact me  Kimani Neff  Nephrology Fellow  [phone]  (After 5pm or on weekends please page the on-call fellow).

## 2021-11-09 NOTE — PROVIDER CONTACT NOTE (OTHER) - BACKGROUND
L ear infection
Her bp was elevated this am, was given 5mg lopressor at 7 am to no effect
asymptomatic, but bps elevated on and off all day
Ca of ear; pmh of DM2, Hypothyroidism; kidney disease; metabolic acidosis; essential hypertension

## 2021-11-09 NOTE — PROVIDER CONTACT NOTE (OTHER) - SITUATION
Pt is hypertensive 190/81
Pt is hypertensive and had an episode of confusion.
During AM vitals, pt. hypotensive 93/40. BP taken twice. Other vitals stable; 18RR 96 O2, 66 HR, 98.2F
Pts bp 173/ 60
Pt is hypertensive after given dose of oral hydralazine.
Pt is hypertensive after given home dose of hydralazine.
Pts bp is elevated to 176/77, asymptomatic

## 2021-11-09 NOTE — PROGRESS NOTE ADULT - SUBJECTIVE AND OBJECTIVE BOX
HPI: 70yo F w/ pmh inclusive of htn, hcl, DM, CKD, breast ca S/p L temporal bone resection with Dr. Park and Dr. Gil 11/3.    Remains extubated on RA, doing well. pain well controlled.   Awake and alert this AM during rounds  JPs with ss, low output      Vital Signs Last 24 Hrs  T(C): 36.7 (09 Nov 2021 05:51), Max: 37.3 (08 Nov 2021 22:17)  T(F): 98 (09 Nov 2021 05:51), Max: 99.2 (08 Nov 2021 22:17)  HR: 79 (09 Nov 2021 05:51) (64 - 79)  BP: 172/62 (09 Nov 2021 05:51) (152/66 - 186/71)  RR: 18 (09 Nov 2021 05:51) (18 - 19)  SpO2: 91% (09 Nov 2021 05:51) (91% - 100%)  Physical Exam:   General: NAD  Respiratory: No respiratory distress, stridor, or stertor  OU:  EOMI  AD: Pinna wnl, EAC clear, TM intact, no effusion  AS: left supraclav flap in place; skin mildly erythematous; incisions c/d/i  Nose: ulcerating lesion on left skin of nare; s/p biopsy, small clot/scabbing of lesion  OC/OP: tongue normal, floor of mouth WNL, no masses or lesions, OP clear  Neck: soft/flat, no LAD    A/P: 71yFemale w/ pmh inclusive of htn, hcl, DM, CKD, breast ca, and fungating ear mass admitted for preop workup and clearance prior to left temporal bone resection on 11/3 with Dr. Park and Dr. Gil.    - dc JPs  - nose path: BCC  - f/u renal recs  - restart synthroid  - monitor mental status  - electrolyte repletions/management  - sqh, SCDs

## 2021-11-09 NOTE — PROVIDER CONTACT NOTE (OTHER) - REASON
Pt is hypertensive after hydralazine was given
Pt is hypertensive after hydralazine was given
bp elevated
bp elevated
pt is hypertensive and had episode of confusion
Pt is hypertensive
hypotensive

## 2021-11-09 NOTE — PROGRESS NOTE ADULT - PROBLEM SELECTOR PLAN 3
uncontrolled   - will inc hydralazine to 100 mg po tid   - add Norvasc 10 mg po daily   - continue to hold lisinopril (given CKD and episode of hyperkalemia) as per renal  - goal SBP <130 given diabetes, but could consider more lenient BP control given age and reported history of falls  - current SBP in acceptable range

## 2021-11-09 NOTE — PROVIDER CONTACT NOTE (OTHER) - ASSESSMENT
Pt A&Ox4, and stable with elevated /81
asymptomatic
Pt remained stable and comfortable; no complaints of pain, no headaches; pt is asymptomatic at this time.
Pt a little restless and agitated; but calmed down and reoriented; pain was present; oxy was given; pt got confused about the time it was. No other symptoms.
Pt remained stable and comfortable; no complaints of pain, slight headache at this time; tylenol was also given.
pt is comfortable
pt. asymptomatic; denies dizziness; lightheadedness

## 2021-11-09 NOTE — PROVIDER CONTACT NOTE (OTHER) - ACTION/TREATMENT ORDERED:
still waiting
IVP metoprolol given to pt; will repeat BP and continue to monitor pt at this time
she will write order when able to
IVP hydralazine given to pt; will repeat BP and continue to monitor pt at this time; pt headache is also resolving at this time
MD aware. continue to monitor. no interventions at this time.
Provider made aware, Provider stated that they are watching pt vitals and team is aware.
No treatment at this time; bed alarm to be set; oxy given and pt pain is resolvng; will continue to monitor pt at this time;.

## 2021-11-09 NOTE — PROGRESS NOTE ADULT - SUBJECTIVE AND OBJECTIVE BOX
Patient is a 71y old  Female who presents with a chief complaint of Temporal bone resection  (08 Nov 2021 07:45)      SUBJECTIVE / OVERNIGHT EVENTS: she denies any complaints except some pain at sx site      ROS:  No HA/DZ  No Vision changes   No CP, SOB  No N/V/D  No Edema  No Rash      MEDICATIONS  (STANDING):  atorvastatin 40 milliGRAM(s) Oral at bedtime  BACItracin   Ointment 1 Application(s) Topical two times a day  dextrose 50% Injectable 25 Gram(s) IV Push once  dextrose 50% Injectable 12.5 Gram(s) IV Push once  dextrose 50% Injectable 25 Gram(s) IV Push once  enoxaparin Injectable 40 milliGRAM(s) SubCutaneous daily  hydrALAZINE 50 milliGRAM(s) Oral three times a day  influenza  Vaccine (HIGH DOSE) 0.7 milliLiter(s) IntraMuscular once  insulin glargine Injectable (LANTUS) 20 Unit(s) SubCutaneous at bedtime  insulin lispro (ADMELOG) corrective regimen sliding scale   SubCutaneous Before meals and at bedtime  levothyroxine 75 MICROGram(s) Oral daily  sodium bicarbonate 325 milliGRAM(s) Oral two times a day    MEDICATIONS  (PRN):  acetaminophen     Tablet .. 650 milliGRAM(s) Oral every 6 hours PRN Mild Pain (1 - 3), Moderate Pain (4 - 6)  acetylcysteine 20%  Inhalation 4 milliLiter(s) Inhalation three times a day PRN mucus congestion  ibuprofen  Tablet. 400 milliGRAM(s) Oral every 6 hours PRN Mild Pain (1 - 3), Moderate Pain (4 - 6)  oxyCODONE    IR 5 milliGRAM(s) Oral every 6 hours PRN Severe Pain (7 - 10)  sodium chloride 3%  Inhalation 4 milliLiter(s) Inhalation two times a day PRN dry mucus      T(C): 36.8 (11-08-21 @ 13:15)  HR: 64 (11-08-21 @ 13:15)  BP: 152/66 (11-08-21 @ 13:15)  RR: 18 (11-08-21 @ 13:15)  SpO2: 99% (11-08-21 @ 13:15)  CAPILLARY BLOOD GLUCOSE      POCT Blood Glucose.: 202 mg/dL (08 Nov 2021 12:08)  POCT Blood Glucose.: 127 mg/dL (08 Nov 2021 08:25)  POCT Blood Glucose.: 189 mg/dL (07 Nov 2021 22:14)  POCT Blood Glucose.: 138 mg/dL (07 Nov 2021 16:57)    I&O's Summary    07 Nov 2021 07:01  -  08 Nov 2021 07:00  --------------------------------------------------------  IN: 1520 mL / OUT: 2202.5 mL / NET: -682.5 mL        PHYSICAL EXAM:  GENERAL: NAD  HEAD:  L temporal scar   EYES: EOMI, PERRL, conjunctiva and sclera clear  NECK: Supple, No JVD  CHEST/LUNG: Clear to auscultation bilaterally  HEART: Regular rate and rhythm; No murmurs, rubs, or gallops, No Edema  ABDOMEN: Soft, Nontender, Nondistended; Bowel sounds present  EXTREMITIES:  2+ Peripheral Pulses, No clubbing, cyanosis      LABS:                        8.7    10.07 )-----------( 227      ( 08 Nov 2021 07:23 )             29.2     11-08    138  |  107  |  56<H>  ----------------------------<  130<H>  4.5   |  21<L>  |  2.12<H>    Ca    8.8      08 Nov 2021 07:23  Phos  2.7     11-08  Mg     2.00     11-08                    RADIOLOGY & ADDITIONAL TESTS:    Imaging Personally Reviewed:    Consultant(s) Notes Reviewed:      Care Discussed with Consultants/Other Providers:

## 2021-11-09 NOTE — PROVIDER CONTACT NOTE (OTHER) - RECOMMENDATIONS
Team made aware; no recommendations at this time; medicine following pt states they want BP to be somewhat elevated; pt is baseline hypertensive.
Team made aware; recommended order something else, IVP BP meds
IVF bolus?
Team made aware; recommended another dose of IVP metoprolol, ordered.
prn for lowering bp
Pt had amlodipine order. Will administer medication ro patient and recheck BP.
prn dose for high blood pressure

## 2021-11-10 LAB
ANION GAP SERPL CALC-SCNC: 9 MMOL/L — SIGNIFICANT CHANGE UP (ref 7–14)
BUN SERPL-MCNC: 47 MG/DL — HIGH (ref 7–23)
CALCIUM SERPL-MCNC: 9.3 MG/DL — SIGNIFICANT CHANGE UP (ref 8.4–10.5)
CHLORIDE SERPL-SCNC: 107 MMOL/L — SIGNIFICANT CHANGE UP (ref 98–107)
CO2 SERPL-SCNC: 26 MMOL/L — SIGNIFICANT CHANGE UP (ref 22–31)
CREAT SERPL-MCNC: 2 MG/DL — HIGH (ref 0.5–1.3)
GLUCOSE BLDC GLUCOMTR-MCNC: 107 MG/DL — HIGH (ref 70–99)
GLUCOSE BLDC GLUCOMTR-MCNC: 119 MG/DL — HIGH (ref 70–99)
GLUCOSE BLDC GLUCOMTR-MCNC: 186 MG/DL — HIGH (ref 70–99)
GLUCOSE BLDC GLUCOMTR-MCNC: 202 MG/DL — HIGH (ref 70–99)
GLUCOSE BLDC GLUCOMTR-MCNC: 217 MG/DL — HIGH (ref 70–99)
GLUCOSE BLDC GLUCOMTR-MCNC: 61 MG/DL — LOW (ref 70–99)
GLUCOSE BLDC GLUCOMTR-MCNC: 62 MG/DL — LOW (ref 70–99)
GLUCOSE SERPL-MCNC: 65 MG/DL — LOW (ref 70–99)
HCT VFR BLD CALC: 31.8 % — LOW (ref 34.5–45)
HGB BLD-MCNC: 9.8 G/DL — LOW (ref 11.5–15.5)
MAGNESIUM SERPL-MCNC: 1.8 MG/DL — SIGNIFICANT CHANGE UP (ref 1.6–2.6)
MCHC RBC-ENTMCNC: 28.3 PG — SIGNIFICANT CHANGE UP (ref 27–34)
MCHC RBC-ENTMCNC: 30.8 GM/DL — LOW (ref 32–36)
MCV RBC AUTO: 91.9 FL — SIGNIFICANT CHANGE UP (ref 80–100)
NRBC # BLD: 0 /100 WBCS — SIGNIFICANT CHANGE UP
NRBC # FLD: 0 K/UL — SIGNIFICANT CHANGE UP
PHOSPHATE SERPL-MCNC: 2.5 MG/DL — SIGNIFICANT CHANGE UP (ref 2.5–4.5)
PLATELET # BLD AUTO: 268 K/UL — SIGNIFICANT CHANGE UP (ref 150–400)
POTASSIUM SERPL-MCNC: 4.3 MMOL/L — SIGNIFICANT CHANGE UP (ref 3.5–5.3)
POTASSIUM SERPL-SCNC: 4.3 MMOL/L — SIGNIFICANT CHANGE UP (ref 3.5–5.3)
RBC # BLD: 3.46 M/UL — LOW (ref 3.8–5.2)
RBC # FLD: 14.1 % — SIGNIFICANT CHANGE UP (ref 10.3–14.5)
SARS-COV-2 RNA SPEC QL NAA+PROBE: SIGNIFICANT CHANGE UP
SODIUM SERPL-SCNC: 142 MMOL/L — SIGNIFICANT CHANGE UP (ref 135–145)
WBC # BLD: 10.23 K/UL — SIGNIFICANT CHANGE UP (ref 3.8–10.5)
WBC # FLD AUTO: 10.23 K/UL — SIGNIFICANT CHANGE UP (ref 3.8–10.5)

## 2021-11-10 PROCEDURE — 99232 SBSQ HOSP IP/OBS MODERATE 35: CPT

## 2021-11-10 RX ORDER — INSULIN GLARGINE 100 [IU]/ML
15 INJECTION, SOLUTION SUBCUTANEOUS AT BEDTIME
Refills: 0 | Status: DISCONTINUED | OUTPATIENT
Start: 2021-11-10 | End: 2021-11-11

## 2021-11-10 RX ADMIN — Medication 325 MILLIGRAM(S): at 17:17

## 2021-11-10 RX ADMIN — Medication 2: at 12:14

## 2021-11-10 RX ADMIN — INSULIN GLARGINE 15 UNIT(S): 100 INJECTION, SOLUTION SUBCUTANEOUS at 22:22

## 2021-11-10 RX ADMIN — Medication 650 MILLIGRAM(S): at 09:32

## 2021-11-10 RX ADMIN — Medication 100 MILLIGRAM(S): at 06:40

## 2021-11-10 RX ADMIN — Medication 2: at 22:21

## 2021-11-10 RX ADMIN — Medication 1 APPLICATION(S): at 17:16

## 2021-11-10 RX ADMIN — Medication 650 MILLIGRAM(S): at 22:20

## 2021-11-10 RX ADMIN — Medication 650 MILLIGRAM(S): at 08:32

## 2021-11-10 RX ADMIN — Medication 1 APPLICATION(S): at 06:40

## 2021-11-10 RX ADMIN — Medication 1: at 17:17

## 2021-11-10 RX ADMIN — Medication 100 MILLIGRAM(S): at 22:19

## 2021-11-10 RX ADMIN — ENOXAPARIN SODIUM 40 MILLIGRAM(S): 100 INJECTION SUBCUTANEOUS at 12:13

## 2021-11-10 RX ADMIN — AMLODIPINE BESYLATE 10 MILLIGRAM(S): 2.5 TABLET ORAL at 06:41

## 2021-11-10 RX ADMIN — Medication 650 MILLIGRAM(S): at 22:50

## 2021-11-10 RX ADMIN — Medication 100 MILLIGRAM(S): at 13:20

## 2021-11-10 RX ADMIN — Medication 325 MILLIGRAM(S): at 06:40

## 2021-11-10 RX ADMIN — Medication 75 MICROGRAM(S): at 06:40

## 2021-11-10 RX ADMIN — ATORVASTATIN CALCIUM 40 MILLIGRAM(S): 80 TABLET, FILM COATED ORAL at 22:20

## 2021-11-10 NOTE — PROGRESS NOTE ADULT - ASSESSMENT
71-year-old female with longstanding history of DM (>40 years), HTN, breast cancer, left ear malignancy and CKD was initially admitted to [facility] on 10/24/21 for L ear infection, then transferred to [facility] on 10/29/21 for further ENT management. Planned for lateral temporal bone resection by ENT team. Pt underwent L auriculectomy, left lateral temporal bone resection, with parotidectomy on 11/3/21. Pt. was transferred to SICU intubated on 11/4/21. Pt. self extubated later in the evening and tx to floors, found with uncontrolled hypertension

## 2021-11-10 NOTE — PROVIDER CONTACT NOTE (HYPOGLYCEMIA EVENT) - NS PROVIDER CONTACT BACKGROUND-HYPO
Age: 71y    Gender: Female    POCT Blood Glucose:  119 mg/dL (11-10-21 @ 08:54)  107 mg/dL (11-10-21 @ 08:53)  61 mg/dL (11-10-21 @ 08:34)  62 mg/dL (11-10-21 @ 08:30)  140 mg/dL (11-09-21 @ 21:32)  133 mg/dL (11-09-21 @ 18:04)  152 mg/dL (11-09-21 @ 17:00)  198 mg/dL (11-09-21 @ 12:23)      eMAR:atorvastatin   40 milliGRAM(s) Oral (11-09-21 @ 22:03)    insulin glargine Injectable (LANTUS)   20 Unit(s) SubCutaneous (11-09-21 @ 22:04)    insulin lispro (ADMELOG) corrective regimen sliding scale   1 Unit(s) SubCutaneous (11-09-21 @ 12:49)    levothyroxine   75 MICROGram(s) Oral (11-10-21 @ 06:40)

## 2021-11-10 NOTE — PROGRESS NOTE ADULT - SUBJECTIVE AND OBJECTIVE BOX
HPI: 72yo F w/ pmh inclusive of htn, hcl, DM, CKD, breast ca S/p L temporal bone resection with Dr. Park and Dr. Gil 11/3.    awake and alert, NAEON, doing well. hypertensive o/n      Vital Signs Last 24 Hrs  T(C): 37.1 (10 Nov 2021 06:40), Max: 37.1 (09 Nov 2021 17:52)  T(F): 98.7 (10 Nov 2021 06:40), Max: 98.8 (09 Nov 2021 17:52)  HR: 67 (10 Nov 2021 06:40) (67 - 89)  BP: 139/73 (10 Nov 2021 06:40) (139/73 - 190/81)  BP(mean): --  RR: 18 (10 Nov 2021 06:40) (17 - 18)  SpO2: 95% (10 Nov 2021 06:40) (93% - 100%)  Physical Exam:   General: NAD  Respiratory: No respiratory distress, stridor, or stertor  OU:  EOMI  AD: Pinna wnl, EAC clear, TM intact, no effusion  AS: left supraclav flap in place; skin mildly erythematous; incisions c/d/i  Nose: ulcerating lesion on left skin of nare; s/p biopsy, small clot/scabbing of lesion  OC/OP: tongue normal, floor of mouth WNL, no masses or lesions, OP clear  Neck: soft/flat, no LAD    A/P: 71yFemale w/ pmh inclusive of htn, hcl, DM, CKD, breast ca, and fungating ear mass admitted for preop workup and clearance prior to left temporal bone resection on 11/3 with Dr. Park and Dr. Gil.    - nose path: BCC  - f/u medicine for BP  - f/u renal recs  - on synthroid  - monitor mental status  - electrolyte repletions/management  - sqh, SCDs

## 2021-11-10 NOTE — PROGRESS NOTE ADULT - PROBLEM SELECTOR PLAN 3
- no better controlled today running in 130s   - cw to 100 mg po tid   - cw Norvasc 10 mg po daily   - continue to hold lisinopril (given CKD and episode of hyperkalemia) as per renal  - goal SBP <130 given diabetes, but could consider more lenient BP control given age and reported history of falls  - current SBP in acceptable range - now better controlled today running in SBPs to 130s   - cw hydralazine 100 mg po tid   - cw Norvasc 10 mg po daily   - continue to hold lisinopril (given CKD and episode of hyperkalemia) as per renal  - goal SBP <130 given diabetes, but could consider more lenient BP control given age and reported history of falls  - current SBP in acceptable range - DBP a bit soft, will monitor for now as asymptomatic

## 2021-11-10 NOTE — PROGRESS NOTE ADULT - PROBLEM SELECTOR PLAN 1
- dw ENT pt reporting pain and some diff swallowing - no e/o respiratory compromise - per ENT swelling unchanged, will monitor for now ans reassess   - management as per ENT  - pain control/wound care as per ENT - dw ENT pt reporting pain and some diff swallowing - no e/o respiratory compromise - per ENT swelling unchanged, will monitor for now and reassess   - management as per ENT  - pain control/wound care as per ENT

## 2021-11-10 NOTE — PROGRESS NOTE ADULT - SUBJECTIVE AND OBJECTIVE BOX
Patient is a 71y old  Female who presents with a chief complaint of Temporal bone resection  (08 Nov 2021 07:45)      SUBJECTIVE / OVERNIGHT EVENTS: c/o worsening L sided neck pain and now diff swallowing. no SOB or stridor - endorses less po intake d.t this pain, and hence was hypoglycemic yesterday to 60s, though asymptomatic     ROS:  No HA/DZ  No Vision changes   No CP, SOB  No N/V/D        MEDICATIONS  (STANDING):  atorvastatin 40 milliGRAM(s) Oral at bedtime  BACItracin   Ointment 1 Application(s) Topical two times a day  dextrose 50% Injectable 25 Gram(s) IV Push once  dextrose 50% Injectable 12.5 Gram(s) IV Push once  dextrose 50% Injectable 25 Gram(s) IV Push once  enoxaparin Injectable 40 milliGRAM(s) SubCutaneous daily  hydrALAZINE 50 milliGRAM(s) Oral three times a day  influenza  Vaccine (HIGH DOSE) 0.7 milliLiter(s) IntraMuscular once  insulin glargine Injectable (LANTUS) 20 Unit(s) SubCutaneous at bedtime  insulin lispro (ADMELOG) corrective regimen sliding scale   SubCutaneous Before meals and at bedtime  levothyroxine 75 MICROGram(s) Oral daily  sodium bicarbonate 325 milliGRAM(s) Oral two times a day    MEDICATIONS  (PRN):  acetaminophen     Tablet .. 650 milliGRAM(s) Oral every 6 hours PRN Mild Pain (1 - 3), Moderate Pain (4 - 6)  acetylcysteine 20%  Inhalation 4 milliLiter(s) Inhalation three times a day PRN mucus congestion  ibuprofen  Tablet. 400 milliGRAM(s) Oral every 6 hours PRN Mild Pain (1 - 3), Moderate Pain (4 - 6)  oxyCODONE    IR 5 milliGRAM(s) Oral every 6 hours PRN Severe Pain (7 - 10)  sodium chloride 3%  Inhalation 4 milliLiter(s) Inhalation two times a day PRN dry mucus      T(C): 36.8 (11-08-21 @ 13:15)  HR: 64 (11-08-21 @ 13:15)  BP: 152/66 (11-08-21 @ 13:15)  RR: 18 (11-08-21 @ 13:15)  SpO2: 99% (11-08-21 @ 13:15)  CAPILLARY BLOOD GLUCOSE      POCT Blood Glucose.: 202 mg/dL (08 Nov 2021 12:08)  POCT Blood Glucose.: 127 mg/dL (08 Nov 2021 08:25)  POCT Blood Glucose.: 189 mg/dL (07 Nov 2021 22:14)  POCT Blood Glucose.: 138 mg/dL (07 Nov 2021 16:57)    I&O's Summary    07 Nov 2021 07:01  -  08 Nov 2021 07:00  --------------------------------------------------------  IN: 1520 mL / OUT: 2202.5 mL / NET: -682.5 mL        PHYSICAL EXAM:  GENERAL: NAD  HEAD:  L temporal scar   EYES: EOMI, PERRL, conjunctiva and sclera clear  NECK: L sided neck swelling unchanged   CHEST/LUNG: Clear to auscultation bilaterally  HEART: Regular rate and rhythm; No murmurs, rubs, or gallops, No Edema  ABDOMEN: Soft, Nontender, Nondistended; Bowel sounds present  EXTREMITIES:  2+ Peripheral Pulses, No clubbing, cyanosis      LABS:                        8.7    10.07 )-----------( 227      ( 08 Nov 2021 07:23 )             29.2     11-08    138  |  107  |  56<H>  ----------------------------<  130<H>  4.5   |  21<L>  |  2.12<H>    Ca    8.8      08 Nov 2021 07:23  Phos  2.7     11-08  Mg     2.00     11-08                    RADIOLOGY & ADDITIONAL TESTS:    Imaging Personally Reviewed:    Consultant(s) Notes Reviewed:      Care Discussed with Consultants/Other Providers:

## 2021-11-10 NOTE — PROGRESS NOTE ADULT - PROBLEM SELECTOR PLAN 2
DM2 c/b hyperglycemia, on insulin therapy  - A1C 7.9 at [facility]  - overnight hypoglycemic to 60 in setting of pt reporting dec po d.t L sided neck pain   - c/w sliding scale insulin coverage  - pt was on Lantus 28 units which was reduced to 20U and admelog 10U tid premeals which have been discontinued  - reduce Lantus to 15 units in light of hypoglycemia and dec po    - continue to hold premeal as no need for now   - goal FS <180 DM2 c/b hyperglycemia, on insulin therapy  - A1C 7.9 at [facility]  - overnight hypoglycemic to 60 in setting of pt reporting dec po d.t L sided neck pain   - c/w sliding scale insulin coverage  - pt was on Lantus 28 units which was reduced to 20U and admelog 10U tid premeals which have been discontinued  - reduce Lantus further to 15 units in light of hypoglycemia and dec po    - continue to hold premeal as no need for now   - goal FS <180

## 2021-11-11 ENCOUNTER — TRANSCRIPTION ENCOUNTER (OUTPATIENT)
Age: 71
End: 2021-11-11

## 2021-11-11 VITALS
OXYGEN SATURATION: 100 % | TEMPERATURE: 98 F | HEART RATE: 80 BPM | SYSTOLIC BLOOD PRESSURE: 151 MMHG | DIASTOLIC BLOOD PRESSURE: 74 MMHG | RESPIRATION RATE: 18 BRPM

## 2021-11-11 LAB
ANION GAP SERPL CALC-SCNC: 10 MMOL/L — SIGNIFICANT CHANGE UP (ref 7–14)
BUN SERPL-MCNC: 46 MG/DL — HIGH (ref 7–23)
CALCIUM SERPL-MCNC: 9.3 MG/DL — SIGNIFICANT CHANGE UP (ref 8.4–10.5)
CHLORIDE SERPL-SCNC: 104 MMOL/L — SIGNIFICANT CHANGE UP (ref 98–107)
CO2 SERPL-SCNC: 24 MMOL/L — SIGNIFICANT CHANGE UP (ref 22–31)
CREAT SERPL-MCNC: 2 MG/DL — HIGH (ref 0.5–1.3)
GLUCOSE BLDC GLUCOMTR-MCNC: 127 MG/DL — HIGH (ref 70–99)
GLUCOSE BLDC GLUCOMTR-MCNC: 169 MG/DL — HIGH (ref 70–99)
GLUCOSE SERPL-MCNC: 118 MG/DL — HIGH (ref 70–99)
HCT VFR BLD CALC: 32.1 % — LOW (ref 34.5–45)
HGB BLD-MCNC: 10 G/DL — LOW (ref 11.5–15.5)
MAGNESIUM SERPL-MCNC: 1.9 MG/DL — SIGNIFICANT CHANGE UP (ref 1.6–2.6)
MCHC RBC-ENTMCNC: 28 PG — SIGNIFICANT CHANGE UP (ref 27–34)
MCHC RBC-ENTMCNC: 31.2 GM/DL — LOW (ref 32–36)
MCV RBC AUTO: 89.9 FL — SIGNIFICANT CHANGE UP (ref 80–100)
NRBC # BLD: 0 /100 WBCS — SIGNIFICANT CHANGE UP
NRBC # FLD: 0 K/UL — SIGNIFICANT CHANGE UP
PHOSPHATE SERPL-MCNC: 2.3 MG/DL — LOW (ref 2.5–4.5)
PLATELET # BLD AUTO: 286 K/UL — SIGNIFICANT CHANGE UP (ref 150–400)
POTASSIUM SERPL-MCNC: 4.1 MMOL/L — SIGNIFICANT CHANGE UP (ref 3.5–5.3)
POTASSIUM SERPL-SCNC: 4.1 MMOL/L — SIGNIFICANT CHANGE UP (ref 3.5–5.3)
RBC # BLD: 3.57 M/UL — LOW (ref 3.8–5.2)
RBC # FLD: 14 % — SIGNIFICANT CHANGE UP (ref 10.3–14.5)
SODIUM SERPL-SCNC: 138 MMOL/L — SIGNIFICANT CHANGE UP (ref 135–145)
WBC # BLD: 10.96 K/UL — HIGH (ref 3.8–10.5)
WBC # FLD AUTO: 10.96 K/UL — HIGH (ref 3.8–10.5)

## 2021-11-11 PROCEDURE — 99239 HOSP IP/OBS DSCHRG MGMT >30: CPT

## 2021-11-11 PROCEDURE — 99232 SBSQ HOSP IP/OBS MODERATE 35: CPT

## 2021-11-11 RX ORDER — SENNA PLUS 8.6 MG/1
2 TABLET ORAL
Qty: 0 | Refills: 0 | DISCHARGE
Start: 2021-11-11

## 2021-11-11 RX ORDER — SENNA PLUS 8.6 MG/1
2 TABLET ORAL AT BEDTIME
Refills: 0 | Status: DISCONTINUED | OUTPATIENT
Start: 2021-11-11 | End: 2021-11-11

## 2021-11-11 RX ORDER — INSULIN GLARGINE 100 [IU]/ML
20 INJECTION, SOLUTION SUBCUTANEOUS
Qty: 0 | Refills: 0 | DISCHARGE
Start: 2021-11-11

## 2021-11-11 RX ADMIN — Medication 1: at 12:09

## 2021-11-11 RX ADMIN — Medication 325 MILLIGRAM(S): at 05:27

## 2021-11-11 RX ADMIN — Medication 1 APPLICATION(S): at 05:27

## 2021-11-11 RX ADMIN — AMLODIPINE BESYLATE 10 MILLIGRAM(S): 2.5 TABLET ORAL at 05:28

## 2021-11-11 RX ADMIN — SENNA PLUS 2 TABLET(S): 8.6 TABLET ORAL at 03:07

## 2021-11-11 RX ADMIN — Medication 100 MILLIGRAM(S): at 05:28

## 2021-11-11 RX ADMIN — Medication 75 MICROGRAM(S): at 05:28

## 2021-11-11 NOTE — PROGRESS NOTE ADULT - PROBLEM SELECTOR PLAN 4
- likely in setting of HTN and diabetes  - management per renal   - c/b metabolic acidosis, renal input appreciated, c/w sodium bicarb supplementation  - avoid nephrotoxins, continue to monitor renal function closely  - off lisinopril as per renal  - Having good urine output
- will c/w hydralazine 50 mg tid (was on med at [facility])  - will hold lisinopril (given CKD and episode of hyperkalemia) as per renal  - goal SBP <130 given diabetes, but could consider more lenient BP control given age and reported history of falls  - current SBP in acceptable range  - if BP above goal consistently, have room to increase dose of hydralazine
- will c/w hydralazine 50 mg tid (was on med at [facility])  - will hold lisinopril (given CKD and episode of hyperkalemia) as per renal  - goal SBP <130 given diabetes, but could consider more lenient BP control given age and reported history of falls  - current SBP (130-140s this AM) in acceptable range  - if BP above goal consistently, have room to increase dose of hydralazine
- with superimposed JOSE - likely hemodynamic mediated - now resolving   - CKD likely in setting of HTN and diabetes  - management per renal   - c/b metabolic acidosis, renal input appreciated, c/w sodium bicarb supplementation - resolving   - avoid nephrotoxins, continue to monitor renal function closely  - off lisinopril as per renal  - Having good urine output
- with superimposed JOSE - likely hemodynamic mediated - now resolving   - CKD likely in setting of HTN and diabetes  - management per renal   - c/b metabolic acidosis, renal input appreciated, c/w sodium bicarb supplementation - resolving   - avoid nephrotoxins, continue to monitor renal function closely  - off lisinopril as per renal  - Having good urine output
- with superimposed JOSE w initail cr of 3 - likely hemodynamic mediated - now resolving   - CKD likely in setting of HTN and diabetes - appears at baseline now around 2   - management per renal   - c/b metabolic acidosis, renal input appreciated, c/w sodium bicarb supplementation - resolving   - avoid nephrotoxins, continue to monitor renal function closely  - off lisinopril as per renal  - Having good urine output
- will c/w hydralazine 50 mg tid (was on med at [facility])  - will hold lisinopril (given CKD and episode of hyperkalemia) as per renal  - goal SBP <130 given diabetes, but could consider more lenient BP control given age and reported history of falls  - current SBP in acceptable range  - if BP above goal consistently, have room to increase dose of hydralazine

## 2021-11-11 NOTE — PROGRESS NOTE ADULT - PROBLEM SELECTOR PROBLEM 1
Squamous cell carcinoma of left ear
Stage 4 chronic kidney disease
Squamous cell carcinoma of left ear
Stage 4 chronic kidney disease
Squamous cell carcinoma of left ear

## 2021-11-11 NOTE — PROGRESS NOTE ADULT - PROBLEM SELECTOR PROBLEM 4
Stage 4 chronic kidney disease
Essential hypertension
Essential hypertension
Stage 4 chronic kidney disease
Essential hypertension
No

## 2021-11-11 NOTE — PROGRESS NOTE ADULT - PROBLEM SELECTOR PLAN 1
- dw ENT - pain and swelling improving today, pt irina po  - per ENT no utility if repeat imaging, will monitor for now   - wound care per ENT  - pain control/wound care as per ENT

## 2021-11-11 NOTE — PROGRESS NOTE ADULT - PROBLEM SELECTOR PLAN 2
DM2 c/b hyperglycemia, on insulin therapy  - A1C 7.9 at [facility]  - hypoglycemic event here to 60 in setting of pt reporting dec po d.t L sided neck pain   - on sliding scale insulin coverage  - pt was on Lantus 28 units which was reduced to 20U and admelog 10U tid premeals which have been discontinued  - reduced Lantus further to 15 units in light of hypoglycemia and dec po  yesterday and FS now stable   - continue to hold premeal as no need for now   - goal FS <180

## 2021-11-11 NOTE — PROGRESS NOTE ADULT - PROBLEM SELECTOR PROBLEM 8
Repeated falls
Prophylactic measure
Repeated falls
Repeated falls
Prophylactic measure

## 2021-11-11 NOTE — PROGRESS NOTE ADULT - PROBLEM SELECTOR PLAN 5
- ?setting of HTN and diabetes  - patient may have anemia of renal disease (hgb 9.6) patient denies any overt bleeding, could consider checking iron studies/ferritin for further anemia w/u.   - c/b metabolic acidosis, renal input appreciated, c/w sodium bicarb supplementation  - c/b hyperkalemia, K improved s/p temporizing measures and lokelma, continue to monitor, low potassium diet  = avoid nephrotoxins, continue to monitor renal function closely  - off lisinopril as per renal
- c/w synthroid  - TSH 3.59, freeT4 1.2, wnl
- likely in setting of HTN and diabetes, Cr ranging 2.6-2.8  - patient may have anemia of renal disease (hgb 9.6) patient denies any overt bleeding, could consider checking iron studies/ferritin for further anemia w/u.   - c/b metabolic acidosis, renal input appreciated, c/w sodium bicarb supplementation  - avoid nephrotoxins, continue to monitor renal function closely  - off lisinopril as per renal  Having good urine output
- c/w synthroid  - TSH 3.59, freeT4 1.2, wnl
- ?setting of HTN and diabetes  - patient may have anemia of renal disease (hgb 9.6) patient denies any overt bleeding, could consider checking iron studies/ferritin for further anemia w/u.   - c/b metabolic acidosis, renal input appreciated, c/w sodium bicarb supplementation  - c/b hyperkalemia, K improved s/p temporizing measures and lokelma, continue to monitor, low potassium diet  - avoid nephrotoxins, continue to monitor renal function closely  - off lisinopril as per renal

## 2021-11-11 NOTE — DISCHARGE NOTE NURSING/CASE MANAGEMENT/SOCIAL WORK - PATIENT PORTAL LINK FT
You can access the FollowMyHealth Patient Portal offered by [facility] by registering at the following website: [email or web address]. By joining [facility]s FollowMyHealth portal, you will also be able to view your health information using other applications (apps) compatible with our system.

## 2021-11-11 NOTE — PROGRESS NOTE ADULT - PROVIDER SPECIALTY LIST ADULT
ENT
Infectious Disease
Nephrology
Nephrology
SICU
ENT
Hospitalist
Hospitalist
Infectious Disease
Nephrology
SICU
Infectious Disease
Nephrology
SICU
Nephrology
Hospitalist
Nephrology
Nephrology
Hospitalist

## 2021-11-11 NOTE — PROGRESS NOTE ADULT - ASSESSMENT
71-year-old female with longstanding history of DM (>40 years), HTN, breast cancer, left ear malignancy and CKD was initially admitted to [facility] on 10/24/21 for L ear infection, then transferred to [facility] on 10/29/21 for further ENT management. Planned for lateral temporal bone resection by ENT team. Pt underwent L auriculectomy, left lateral temporal bone resection, with parotidectomy on 11/3/21. Pt. was transferred to SICU intubated on 11/4/21. Pt. self extubated later and tx to floors, found with uncontrolled hypertension

## 2021-11-11 NOTE — PROGRESS NOTE ADULT - PROBLEM SELECTOR PROBLEM 5
Hypothyroidism
Stage 4 chronic kidney disease
Stage 4 chronic kidney disease
Hypothyroidism
Hypothyroidism
Stage 4 chronic kidney disease
Hypothyroidism

## 2021-11-11 NOTE — PROGRESS NOTE ADULT - PROBLEM SELECTOR PLAN 3
- now better controlled today running in SBPs to 130s   - cw hydralazine 100 mg po tid   - cw Norvasc 10 mg po daily   - continue to hold lisinopril (given CKD and episode of hyperkalemia) as per renal  - goal SBP <130 given diabetes, but could consider more lenient BP control given age and reported history of falls  - current SBP in acceptable range

## 2021-11-11 NOTE — PROGRESS NOTE ADULT - PROBLEM SELECTOR PROBLEM 2
Metabolic acidosis
Pre-op evaluation
Metabolic acidosis
Type 2 diabetes mellitus with hyperglycemia
Pre-op evaluation
Type 2 diabetes mellitus with hyperglycemia
Pre-op evaluation

## 2021-11-11 NOTE — PROGRESS NOTE ADULT - SUBJECTIVE AND OBJECTIVE BOX
Patient is a 71y old  Female who presents with a chief complaint of Temporal bone resection  (08 Nov 2021 07:45)      SUBJECTIVE / OVERNIGHT EVENTS: feels a bit better today with improved swallowing., agrees that swelling is improving. had some difficulty sleeping d.t neck pain. pressures overall better though was hypertensive x1 last night     ROS:  No HA/DZ  No Vision changes   No CP, SOB  No N/V/D  No Rash  NO weakness, numbness    MEDICATIONS  (STANDING):  amLODIPine   Tablet 10 milliGRAM(s) Oral daily  atorvastatin 40 milliGRAM(s) Oral at bedtime  BACItracin   Ointment 1 Application(s) Topical two times a day  dextrose 50% Injectable 25 Gram(s) IV Push once  dextrose 50% Injectable 12.5 Gram(s) IV Push once  dextrose 50% Injectable 25 Gram(s) IV Push once  enoxaparin Injectable 40 milliGRAM(s) SubCutaneous daily  hydrALAZINE 100 milliGRAM(s) Oral every 8 hours  influenza  Vaccine (HIGH DOSE) 0.7 milliLiter(s) IntraMuscular once  insulin glargine Injectable (LANTUS) 15 Unit(s) SubCutaneous at bedtime  insulin lispro (ADMELOG) corrective regimen sliding scale   SubCutaneous Before meals and at bedtime  levothyroxine 75 MICROGram(s) Oral daily  senna 2 Tablet(s) Oral at bedtime  sodium bicarbonate 325 milliGRAM(s) Oral two times a day    MEDICATIONS  (PRN):  acetaminophen     Tablet .. 650 milliGRAM(s) Oral every 6 hours PRN Mild Pain (1 - 3), Moderate Pain (4 - 6)  acetylcysteine 20%  Inhalation 4 milliLiter(s) Inhalation three times a day PRN mucus congestion  ibuprofen  Tablet. 400 milliGRAM(s) Oral every 6 hours PRN Mild Pain (1 - 3), Moderate Pain (4 - 6)  oxyCODONE    IR 5 milliGRAM(s) Oral every 6 hours PRN Severe Pain (7 - 10)  sodium chloride 3%  Inhalation 4 milliLiter(s) Inhalation two times a day PRN dry mucus      T(C): 36.8 (11-11-21 @ 02:14)  HR: 66 (11-11-21 @ 02:14)  BP: 143/73 (11-11-21 @ 02:14)  RR: 18 (11-11-21 @ 02:14)  SpO2: 97% (11-11-21 @ 02:14)  CAPILLARY BLOOD GLUCOSE      POCT Blood Glucose.: 127 mg/dL (11 Nov 2021 08:14)  POCT Blood Glucose.: 202 mg/dL (10 Nov 2021 21:31)  POCT Blood Glucose.: 186 mg/dL (10 Nov 2021 17:15)  POCT Blood Glucose.: 217 mg/dL (10 Nov 2021 12:09)    I&O's Summary    10 Nov 2021 07:01  -  11 Nov 2021 07:00  --------------------------------------------------------  IN: 480 mL / OUT: 1150 mL / NET: -670 mL        PHYSICAL EXAM:  General: NAD  Respiratory: No respiratory distress, stridor, or stertor  OU:  EOMI  AD: Pinna wnl, EAC clear, TM intact, no effusion  AS: left supraclav flap in place; skin mildly erythematous; incisions c/d/i  Nose: ulcerating lesion on left skin of nare; s/p biopsy, small clot/scabbing of lesion  OC/OP: tongue normal, floor of mouth WNL, no masses or lesions, OP clear  Neck: soft/flat, no LAD      LABS:                        10.0   10.96 )-----------( 286      ( 11 Nov 2021 07:23 )             32.1     11-11    138  |  104  |  46<H>  ----------------------------<  118<H>  4.1   |  24  |  2.00<H>    Ca    9.3      11 Nov 2021 07:23  Phos  2.3     11-11  Mg     1.90     11-11                    RADIOLOGY & ADDITIONAL TESTS:    Imaging Personally Reviewed:    Consultant(s) Notes Reviewed:      Care Discussed with Consultants/Other Providers:

## 2021-11-11 NOTE — PROGRESS NOTE ADULT - PROBLEM SELECTOR PROBLEM 7
Repeated falls
Hyperlipidemia
Repeated falls
Hyperlipidemia
Repeated falls
Hyperlipidemia
Repeated falls

## 2021-11-11 NOTE — PROGRESS NOTE ADULT - PROBLEM SELECTOR PLAN 8
VTE ppx: given malignancy, would benefit from pharmacological VTE ppx, c/w HSQ given renal dysfunction, c/w HSQ  consistent carb/DASH/low potassium diet
VTE ppx: given malignancy, would benefit from pharmacological VTE ppx, c/w HSQ given renal dysfunction, c/w HSQ  consistent carb/DASH/low potassium diet
- reports use of cane, history of falls  - fall precautions, consider PT eval
- reports use of cane, history of falls  - fall precautions, consider PT eval
VTE ppx: given malignancy, would benefit from pharmacological VTE ppx, c/w HSQ given renal dysfunction, c/w HSQ  consistent carb/DASH/low potassium diet
VTE ppx: given malignancy, would benefit from pharmacological VTE ppx, c/w HSQ given renal dysfunction, c/w HSQ  consistent carb/DASH/low potassium diet  Dispo - SNF/rehab - accepted to [facility] - no objection to dc from medicine perspective when ready  - would dc on current insulin and BP regimen
- reports use of cane, history of falls  - fall precautions, consider PT eval

## 2021-11-11 NOTE — PROGRESS NOTE ADULT - PROBLEM SELECTOR PROBLEM 3
Essential hypertension
Type 2 diabetes mellitus with hyperglycemia
Essential hypertension
Type 2 diabetes mellitus with hyperglycemia
Essential hypertension
Type 2 diabetes mellitus with hyperglycemia
Essential hypertension

## 2021-11-11 NOTE — PROGRESS NOTE ADULT - SUBJECTIVE AND OBJECTIVE BOX
HPI: 70yo F w/ pmh inclusive of htn, hcl, DM, CKD, breast ca S/p L temporal bone resection with Dr. Park and Dr. Gil 11/3.    awake and alert, NAEON, doing well.     Vital Signs Last 24 Hrs  T(C): 36.8 (11 Nov 2021 02:14), Max: 37.2 (10 Nov 2021 10:18)  T(F): 98.2 (11 Nov 2021 02:14), Max: 99 (10 Nov 2021 10:18)  HR: 66 (11 Nov 2021 02:14) (66 - 76)  BP: 143/73 (11 Nov 2021 02:14) (131/73 - 180/66)  BP(mean): --  RR: 18 (11 Nov 2021 02:14) (18 - 18)  SpO2: 97% (11 Nov 2021 02:14) (97% - 100%)    Physical Exam:   General: NAD  Respiratory: No respiratory distress, stridor, or stertor  OU:  EOMI  AD: Pinna wnl, EAC clear, TM intact, no effusion  AS: left supraclav flap in place; skin mildly erythematous; incisions c/d/i  Nose: ulcerating lesion on left skin of nare; s/p biopsy, small clot/scabbing of lesion  OC/OP: tongue normal, floor of mouth WNL, no masses or lesions, OP clear  Neck: soft/flat, no LAD    A/P: 71yFemale w/ pmh inclusive of htn, hcl, DM, CKD, breast ca, and fungating ear mass admitted for preop workup and clearance prior to left temporal bone resection on 11/3 with Dr. Park and Dr. Gil.    - nose path: BCC  - f/u medicine for BP  - f/u renal recs  - on synthroid  - monitor mental status  - electrolyte repletions/management  - sqh, SCDs

## 2021-11-11 NOTE — PROGRESS NOTE ADULT - PROBLEM SELECTOR PLAN 7
- reports use of cane, history of falls  - fall precautions, consider PT eval
- c/w statin
- c/w statin
- reports use of cane, history of falls  - fall precautions, consider PT eval
- reports use of cane, history of falls  - fall precautions, consider PT eval
- c/w statin
- reports use of cane, history of falls  - fall precautions, consider PT eval

## 2021-11-11 NOTE — PROGRESS NOTE ADULT - PROBLEM SELECTOR PROBLEM 6
Hyperlipidemia
Hypothyroidism
Hyperlipidemia
Hypothyroidism
Hypothyroidism

## 2021-11-24 ENCOUNTER — EMERGENCY (EMERGENCY)
Facility: HOSPITAL | Age: 71
LOS: 1 days | Discharge: HOME | End: 2021-11-24
Attending: EMERGENCY MEDICINE | Admitting: EMERGENCY MEDICINE
Payer: MEDICARE

## 2021-11-24 VITALS
DIASTOLIC BLOOD PRESSURE: 65 MMHG | HEIGHT: 60.98 IN | HEART RATE: 75 BPM | RESPIRATION RATE: 18 BRPM | TEMPERATURE: 98 F | OXYGEN SATURATION: 100 % | SYSTOLIC BLOOD PRESSURE: 157 MMHG

## 2021-11-24 LAB
ALBUMIN SERPL ELPH-MCNC: 3.6 G/DL — SIGNIFICANT CHANGE UP (ref 3.3–5)
ALP SERPL-CCNC: 111 U/L — SIGNIFICANT CHANGE UP (ref 40–120)
ALT FLD-CCNC: 9 U/L — SIGNIFICANT CHANGE UP (ref 4–33)
ANION GAP SERPL CALC-SCNC: 13 MMOL/L — SIGNIFICANT CHANGE UP (ref 7–14)
APTT BLD: 29.9 SEC — SIGNIFICANT CHANGE UP (ref 27–36.3)
AST SERPL-CCNC: 15 U/L — SIGNIFICANT CHANGE UP (ref 4–32)
BASE EXCESS BLDV CALC-SCNC: -3.3 MMOL/L — LOW (ref -2–3)
BASOPHILS # BLD AUTO: 0.06 K/UL — SIGNIFICANT CHANGE UP (ref 0–0.2)
BASOPHILS NFR BLD AUTO: 0.8 % — SIGNIFICANT CHANGE UP (ref 0–2)
BILIRUB SERPL-MCNC: 0.3 MG/DL — SIGNIFICANT CHANGE UP (ref 0.2–1.2)
BLD GP AB SCN SERPL QL: NEGATIVE — SIGNIFICANT CHANGE UP
BLOOD GAS VENOUS COMPREHENSIVE RESULT: SIGNIFICANT CHANGE UP
BUN SERPL-MCNC: 62 MG/DL — HIGH (ref 7–23)
CALCIUM SERPL-MCNC: 9.6 MG/DL — SIGNIFICANT CHANGE UP (ref 8.4–10.5)
CHLORIDE BLDV-SCNC: 108 MMOL/L — SIGNIFICANT CHANGE UP (ref 96–108)
CHLORIDE SERPL-SCNC: 102 MMOL/L — SIGNIFICANT CHANGE UP (ref 98–107)
CO2 BLDV-SCNC: 24.1 MMOL/L — SIGNIFICANT CHANGE UP (ref 22–26)
CO2 SERPL-SCNC: 21 MMOL/L — LOW (ref 22–31)
CREAT SERPL-MCNC: 2.56 MG/DL — HIGH (ref 0.5–1.3)
EOSINOPHIL # BLD AUTO: 0.28 K/UL — SIGNIFICANT CHANGE UP (ref 0–0.5)
EOSINOPHIL NFR BLD AUTO: 3.6 % — SIGNIFICANT CHANGE UP (ref 0–6)
GAS PNL BLDV: 133 MMOL/L — LOW (ref 136–145)
GLUCOSE BLDV-MCNC: 262 MG/DL — HIGH (ref 70–99)
GLUCOSE SERPL-MCNC: 257 MG/DL — HIGH (ref 70–99)
HCO3 BLDV-SCNC: 23 MMOL/L — SIGNIFICANT CHANGE UP (ref 22–29)
HCT VFR BLD CALC: 30.7 % — LOW (ref 34.5–45)
HCT VFR BLDA CALC: 28 % — LOW (ref 34.5–46.5)
HGB BLD CALC-MCNC: 9.3 G/DL — LOW (ref 11.5–15.5)
HGB BLD-MCNC: 9.2 G/DL — LOW (ref 11.5–15.5)
IANC: 5.52 K/UL — SIGNIFICANT CHANGE UP (ref 1.5–8.5)
IMM GRANULOCYTES NFR BLD AUTO: 0.3 % — SIGNIFICANT CHANGE UP (ref 0–1.5)
INR BLD: 1.04 RATIO — SIGNIFICANT CHANGE UP (ref 0.88–1.16)
LACTATE BLDV-MCNC: 1.3 MMOL/L — SIGNIFICANT CHANGE UP (ref 0.5–2)
LYMPHOCYTES # BLD AUTO: 1.25 K/UL — SIGNIFICANT CHANGE UP (ref 1–3.3)
LYMPHOCYTES # BLD AUTO: 16.3 % — SIGNIFICANT CHANGE UP (ref 13–44)
MAGNESIUM SERPL-MCNC: 1.9 MG/DL — SIGNIFICANT CHANGE UP (ref 1.6–2.6)
MCHC RBC-ENTMCNC: 27.5 PG — SIGNIFICANT CHANGE UP (ref 27–34)
MCHC RBC-ENTMCNC: 30 GM/DL — LOW (ref 32–36)
MCV RBC AUTO: 91.9 FL — SIGNIFICANT CHANGE UP (ref 80–100)
MONOCYTES # BLD AUTO: 0.56 K/UL — SIGNIFICANT CHANGE UP (ref 0–0.9)
MONOCYTES NFR BLD AUTO: 7.3 % — SIGNIFICANT CHANGE UP (ref 2–14)
NEUTROPHILS # BLD AUTO: 5.52 K/UL — SIGNIFICANT CHANGE UP (ref 1.8–7.4)
NEUTROPHILS NFR BLD AUTO: 71.7 % — SIGNIFICANT CHANGE UP (ref 43–77)
NRBC # BLD: 0 /100 WBCS — SIGNIFICANT CHANGE UP
NRBC # FLD: 0 K/UL — SIGNIFICANT CHANGE UP
PCO2 BLDV: 44 MMHG — HIGH (ref 39–42)
PH BLDV: 7.32 — SIGNIFICANT CHANGE UP (ref 7.32–7.43)
PHOSPHATE SERPL-MCNC: 4.4 MG/DL — SIGNIFICANT CHANGE UP (ref 2.5–4.5)
PLATELET # BLD AUTO: 304 K/UL — SIGNIFICANT CHANGE UP (ref 150–400)
PO2 BLDV: 44 MMHG — SIGNIFICANT CHANGE UP
POTASSIUM BLDV-SCNC: 6.2 MMOL/L — CRITICAL HIGH (ref 3.5–5.1)
POTASSIUM SERPL-MCNC: 5.9 MMOL/L — HIGH (ref 3.5–5.3)
POTASSIUM SERPL-SCNC: 5.9 MMOL/L — HIGH (ref 3.5–5.3)
PROT SERPL-MCNC: 7.2 G/DL — SIGNIFICANT CHANGE UP (ref 6–8.3)
PROTHROM AB SERPL-ACNC: 11.8 SEC — SIGNIFICANT CHANGE UP (ref 10.6–13.6)
RBC # BLD: 3.34 M/UL — LOW (ref 3.8–5.2)
RBC # FLD: 13.7 % — SIGNIFICANT CHANGE UP (ref 10.3–14.5)
RH IG SCN BLD-IMP: POSITIVE — SIGNIFICANT CHANGE UP
SAO2 % BLDV: 78.1 % — SIGNIFICANT CHANGE UP
SODIUM SERPL-SCNC: 136 MMOL/L — SIGNIFICANT CHANGE UP (ref 135–145)
WBC # BLD: 7.69 K/UL — SIGNIFICANT CHANGE UP (ref 3.8–10.5)
WBC # FLD AUTO: 7.69 K/UL — SIGNIFICANT CHANGE UP (ref 3.8–10.5)

## 2021-11-24 PROCEDURE — 99284 EMERGENCY DEPT VISIT MOD MDM: CPT

## 2021-11-24 RX ORDER — ACETAMINOPHEN 500 MG
650 TABLET ORAL ONCE
Refills: 0 | Status: COMPLETED | OUTPATIENT
Start: 2021-11-24 | End: 2021-11-24

## 2021-11-24 RX ORDER — CEFAZOLIN SODIUM 1 G
VIAL (EA) INJECTION
Refills: 0 | Status: DISCONTINUED | OUTPATIENT
Start: 2021-11-24 | End: 2021-11-24

## 2021-11-24 RX ORDER — VANCOMYCIN HCL 1 G
1000 VIAL (EA) INTRAVENOUS ONCE
Refills: 0 | Status: DISCONTINUED | OUTPATIENT
Start: 2021-11-24 | End: 2021-11-24

## 2021-11-24 RX ORDER — PIPERACILLIN AND TAZOBACTAM 4; .5 G/20ML; G/20ML
3.38 INJECTION, POWDER, LYOPHILIZED, FOR SOLUTION INTRAVENOUS ONCE
Refills: 0 | Status: DISCONTINUED | OUTPATIENT
Start: 2021-11-24 | End: 2021-11-24

## 2021-11-24 RX ADMIN — Medication 650 MILLIGRAM(S): at 22:57

## 2021-11-24 NOTE — ED PROVIDER NOTE - CLINICAL SUMMARY MEDICAL DECISION MAKING FREE TEXT BOX
71 year old female w/ PMHx of HTN, HLD, DM, CKD, breast cancer, and left ear SCC (s/p resection 11/3) p/w signs post-op infection around left head and neck. Ordered labs, patient given Tylenol for headache. ENT consulted. Per ENT, post-op site is not purulent, and likely a bacitracin reaction. Patient can return to [facility], with instructions not to apply bacitracin, and instead hydrocortisone cream for swelling. Tylenol prn for headache. Workup results unremarkable. Patient can be discharged back to [facility]. Michael att: 71 year old female w/ PMHx of HTN, HLD, DM, CKD, breast cancer, and left ear SCC (s/p resection 11/3) p/w signs post-op infection around left head and neck. Ordered labs, patient given Tylenol for headache. ENT consulted. Per ENT, post-op site is not purulent, and likely a bacitracin reaction. Patient can return to [facility], with instructions not to apply bacitracin, and instead hydrocortisone cream for swelling. Tylenol prn for headache. Workup results unremarkable. Patient can be discharged back to [facility].

## 2021-11-24 NOTE — ED PROVIDER NOTE - PHYSICAL EXAMINATION
VITALS:   T(C): 36.2 (11-24-21 @ 20:55), Max: 36.6 (11-24-21 @ 19:11)  HR: 78 (11-24-21 @ 20:55) (75 - 78)  BP: 163/71 (11-24-21 @ 20:55) (157/65 - 163/71)  RR: 17 (11-24-21 @ 20:55) (17 - 18)  SpO2: 100% (11-24-21 @ 20:55) (100% - 100%)    PHYSICAL EXAM:   General: NAD; awake, lying in bed comfortably  HEENT: PERRLA, moist mucous membranes; post-op swelling w/ bacitracin over incision sites   Neck: supple, no JVD; post-op swelling w/ bacitracin over incision sites   Heart: RRR; no murmurs, rubs, or gallops  Chest/Lung: clear to auscultation bilaterally; no wheezes, rales, or rhonchi  Abdomen: soft, obese, non-tender; no guarding or rebound; bowel sounds present  Extremities: no edema, erythema, or tenderness to palpation  Skin: post-op swelling w/ bacitracin over incision sites   Neuro: A&Ox3; no focal deficits, grossly intact

## 2021-11-24 NOTE — CONSULT NOTE ADULT - SUBJECTIVE AND OBJECTIVE BOX
HPI:  Patient is a 71y Female with PMH significant for SCC of L auricle s/p L temporal bone resection, parotidectomy, with supraclavicular flap on 11/3. coming to ED from [facility] for burning and erythema around surgical site. has been using bacitracin on incision twice a day since discharge. has started to burn on touch. no purulence draining. no fevers.    Physical Exam  T(C): 36.2 (11-24-21 @ 20:55), Max: 36.6 (11-24-21 @ 19:11)  HR: 78 (11-24-21 @ 20:55) (75 - 78)  BP: 163/71 (11-24-21 @ 20:55) (157/65 - 163/71)  RR: 17 (11-24-21 @ 20:55) (17 - 18)  SpO2: 100% (11-24-21 @ 20:55) (100% - 100%)  General: NAD, A+Ox3  No respiratory distress, stridor, or stertor  Voice quality: normal  Face:  Symmetric  OU:  EOMI  AD: Pinna wnl, EAC clear  s/p L auriculectomy surigical incision which extends down to neck is erythematous and burns when touched. no underlying collection or fullness. white pustular lesions all along incision.  OC/OP: clear, wnl no lesions      Assessment and Plan:  71y year old Female s/p L auriculectomy, lateral temporal bone resection, parotidectomy, SND, and supraclavicular flap with bacitracin skin reaction. can be dc'ed back to [facility] with strict instructions to no longer use bacitracin. can use hydrocortisone prn for any burning or itching. otherwise keep incision dry.  - return if fails to improve or worsens  - discussed with Dr. Park

## 2021-11-24 NOTE — ED PROVIDER NOTE - OBJECTIVE STATEMENT
The patient is a 71 year old female w/ PMHx of The patient is a 71 year old female w/ PMHx of HTN, HLD, DM, CKD, breast cancer, and left ear SCC (s/p resection 11/3) p/w post-op infection. The patient underwent a left auriculectomy and left lateral temporal bone resection, reconstructed with left supraclavicular flap on 11/3 for fungating SCC of the left ear. She was discharged to [facility] sub-acute rehab on 11/11. The patient reports that The patient is a 71 year old female w/ PMHx of HTN, HLD, DM, CKD, breast cancer, and left ear SCC (s/p resection 11/3) p/w post-op infection. The patient underwent a left auriculectomy and left lateral temporal bone resection, reconstructed with left supraclavicular flap on 11/3 for fungating SCC of the left ear. She was discharged to [facility] sub-acute rehab on 11/11. The patient reports that since the procedure, she has been experiencing persistent post-operative pain and swelling to the left side of her head, as well as difficulty swallowing, though she The patient is a 71 year old female w/ PMHx of HTN, HLD, DM, CKD, breast cancer, and left ear SCC (s/p resection 11/3) p/w post-op infection. The patient underwent a left auriculectomy and left lateral temporal bone resection, reconstructed with left supraclavicular flap on 11/3 for fungating SCC of the left ear. She was discharged to [facility] sub-acute rehab on 11/11. The patient reports that since the procedure, she has been experiencing persistent post-operative pain and swelling to the left side of her head, as well as difficulty swallowing, though she has been tolerating a soft diet well. Over the past few days, the [facility] staff have been concerned about signs of post-op infection, with increasing redness and pus to the patient's surgical site, eventually prompting her The patient is a 71 year old female w/ PMHx of HTN, HLD, DM, CKD, breast cancer, and left ear SCC (s/p resection 11/3) p/w post-op infection. The patient underwent a left auriculectomy and left lateral temporal bone resection, reconstructed with left supraclavicular flap on 11/3 for fungating SCC of the left ear. She was discharged to [facility] sub-acute rehab on 11/11. The patient reports that since the procedure, she has been experiencing persistent post-operative pain and swelling to the left side of her head, as well as difficulty swallowing, though she has been tolerating a soft diet well. Over the past few days, the [facility] staff have been concerned about signs of post-op infection, with increasing redness and pus to the patient's surgical site, prompting The patient is a 71 year old female w/ PMHx of HTN, HLD, DM, CKD, breast cancer, and left ear SCC (s/p resection 11/3) p/w post-op infection. The patient underwent a left auriculectomy and left lateral temporal bone resection, reconstructed with left supraclavicular flap on 11/3 for fungating SCC of the left ear. She was discharged to [facility] sub-acute rehab on 11/11. The patient reports that since the procedure, she has been experiencing persistent post-operative pain and swelling to the left side of her head, as well as difficulty swallowing, though she has been tolerating a soft diet well. Over the past few days, the [facility] staff have been concerned about signs of post-op infection, with increasing redness and pus to the patient's surgical site, prompting them to call the EMS to present the patient to the ED. Patient denies fever, cough, chest pain, SOB, abdominal pain, nausea/vomiting, diarrhea, dysuria, The patient is a 71 year old female w/ PMHx of HTN, HLD, DM, CKD, breast cancer, and left ear SCC (s/p resection 11/3) p/w post-op infection. The patient underwent a left auriculectomy and left lateral temporal bone resection, reconstructed with left supraclavicular flap on 11/3 for fungating SCC of the left ear. She was discharged to [facility] sub-acute rehab on 11/11. The patient reports that since the procedure, she has been experiencing persistent post-operative pain and swelling to the left side of her head, as well as difficulty swallowing, though she has been tolerating a soft diet well. Over the past few days, the [facility] staff have been concerned about signs of post-op infection, with increasing redness and pus to the patient's surgical site, prompting them to call the EMS to present the patient to the ED. Patient denies fever, cough, chest pain, SOB, abdominal pain, nausea/vomiting, diarrhea, dysuria, dizziness. The patient is a 71 year old female w/ PMHx of HTN, HLD, DM, CKD, breast cancer, and left ear SCC (s/p resection 11/3) p/w post-op infection. The patient underwent a left auriculectomy and left lateral temporal bone resection, reconstructed with left supraclavicular flap on 11/3 for fungating SCC of the left ear. She was discharged to [facility] sub-acute rehab on 11/11. The patient reports that since the procedure, she has been experiencing persistent post-operative pain and swelling to the left side of her head, as well as difficulty swallowing, though she has been tolerating a soft diet well. Over the past few days, the [facility] staff have been concerned about signs of post-op infection, with increasing redness and pus to the patient's surgical site, prompting them to call the EMS to present the patient to the ED. Patient denies fever, chills, cough, chest pain, SOB, abdominal pain, nausea/vomiting, diarrhea, dysuria, dizziness.

## 2021-11-24 NOTE — ED PROVIDER NOTE - PATIENT PORTAL LINK FT
You can access the FollowMyHealth Patient Portal offered by [facility] by registering at the following website: [email or web address]. By joining [employer]’s FollowMyHealth portal, you will also be able to view your health information using other applications (apps) compatible with our system.

## 2021-11-24 NOTE — ED ADULT TRIAGE NOTE - CHIEF COMPLAINT QUOTE
Arrives from nursing home for post op infection, had surgical procedure to remove left ear and area is now infected. Pt denies fevers

## 2021-11-24 NOTE — PROVIDER CONTACT NOTE (OTHER) - RECOMMENDATIONS
SW contacted [facility] to arrange transport.  Will assign to [facility].

## 2021-11-25 VITALS
TEMPERATURE: 98 F | OXYGEN SATURATION: 99 % | DIASTOLIC BLOOD PRESSURE: 69 MMHG | SYSTOLIC BLOOD PRESSURE: 142 MMHG | HEART RATE: 68 BPM | RESPIRATION RATE: 17 BRPM

## 2021-11-25 LAB — SARS-COV-2 RNA SPEC QL NAA+PROBE: SIGNIFICANT CHANGE UP

## 2021-11-25 RX ORDER — ACETAMINOPHEN 500 MG
325 TABLET ORAL ONCE
Refills: 0 | Status: COMPLETED | OUTPATIENT
Start: 2021-11-25 | End: 2021-11-25

## 2021-11-25 RX ADMIN — Medication 325 MILLIGRAM(S): at 06:18

## 2021-11-25 NOTE — ED ADULT NURSE NOTE - OBJECTIVE STATEMENT
71 year old female received to rm 29 AAOx4 nonambulatory. Pt coming from [facility] c/o pain to head and left neck incision site. Pt had surgery 11/3/21 to remove left ear d/t skin cancer. Pt has redness to incision site. Pt taking tylenol and motrin at home with partial relief. Pt denies headache, dizziness, chest pain, shortness of breath, n/v/d, fever, chills. Respirations even and unlabored. PIV #20 right AC, labs drawn and sent. VS as noted. Bed in lowest position, call bell within reach

## 2021-11-25 NOTE — ED ADULT NURSE NOTE - NSIMPLEMENTINTERV_GEN_ALL_ED
Implemented All Fall Risk Interventions:  [place] to call system. Call bell, personal items and telephone within reach. Instruct patient to call for assistance. Room bathroom lighting operational. Non-slip footwear when patient is off stretcher. Physically safe environment: no spills, clutter or unnecessary equipment. Stretcher in lowest position, wheels locked, appropriate side rails in place. Provide visual cue, wrist band, yellow gown, etc. Monitor gait and stability. Monitor for mental status changes and reorient to person, place, and time. Review medications for side effects contributing to fall risk. Reinforce activity limits and safety measures with patient and family.

## 2021-11-30 ENCOUNTER — APPOINTMENT (OUTPATIENT)
Dept: OTOLARYNGOLOGY | Facility: CLINIC | Age: 71
End: 2021-11-30
Payer: MEDICARE

## 2021-11-30 DIAGNOSIS — C44.219 BASAL CELL CARCINOMA OF SKIN OF LEFT EAR AND EXTERNAL AURICULAR CANAL: ICD-10-CM

## 2021-11-30 LAB
CULTURE RESULTS: SIGNIFICANT CHANGE UP
CULTURE RESULTS: SIGNIFICANT CHANGE UP
SPECIMEN SOURCE: SIGNIFICANT CHANGE UP
SPECIMEN SOURCE: SIGNIFICANT CHANGE UP

## 2021-11-30 PROCEDURE — 99024 POSTOP FOLLOW-UP VISIT: CPT

## 2021-11-30 NOTE — REASON FOR VISIT
[Post-Operative Visit] : a post-operative visit [Other: _____] : [unfilled] [FreeTextEntry2] : SCC of the external ear

## 2021-11-30 NOTE — PHYSICAL EXAM
[de-identified] : Flap is healing well.  Incisions healing well, no dehiscence.  Slight weakness of shoulder ROM on left side.  No LAD. [Midline] : trachea located in midline position [Normal] : no rashes

## 2021-11-30 NOTE — HISTORY OF PRESENT ILLNESS
[de-identified] : 71 year old female with history of SCC of the external ear. s/p Left auriculectomy, left parotidectomy, left neck dissection, reconstruction  using sternocleidomastoid muscle flap, left supraclavicular island flap, repair of donor site using local tissue advancement measuring approximately 50 sq cm _ and s/p Left temporal bone resection with facial nerve monitoring (Dr. Gil) both completed 11/03/2021. Patient states she resides in the nursing home. States that the nursing home sent her back to the hospital shortly after discharge due to neck swelling. Sepsis work up completed, no admission, treatment or antibiotics given, patient discharged back to nursing home. Patient denies pain, bleeding or drainage from the incision site. No fevers or chills. Reports minor dysphagia post operatively, has resolved.

## 2021-12-02 ENCOUNTER — NON-APPOINTMENT (OUTPATIENT)
Age: 71
End: 2021-12-02

## 2021-12-09 ENCOUNTER — APPOINTMENT (OUTPATIENT)
Dept: RADIATION ONCOLOGY | Facility: CLINIC | Age: 71
End: 2021-12-09

## 2021-12-17 NOTE — ED ADULT NURSE NOTE - NSFALLRSKPASTHIST_ED_ALL_ED
no PROVIDER:[TOKEN:[[id number]]] PROVIDER:[TOKEN:[[id number]]],PROVIDER:[TOKEN:[[id number]]]

## 2022-01-10 ENCOUNTER — APPOINTMENT (OUTPATIENT)
Dept: OTOLARYNGOLOGY | Facility: CLINIC | Age: 72
End: 2022-01-10

## 2022-08-09 ENCOUNTER — APPOINTMENT (OUTPATIENT)
Dept: OTOLARYNGOLOGY | Facility: CLINIC | Age: 72
End: 2022-08-09

## 2022-08-12 ENCOUNTER — APPOINTMENT (OUTPATIENT)
Dept: OTOLARYNGOLOGY | Facility: CLINIC | Age: 72
End: 2022-08-12

## 2022-08-12 VITALS
HEART RATE: 69 BPM | HEIGHT: 61 IN | SYSTOLIC BLOOD PRESSURE: 105 MMHG | DIASTOLIC BLOOD PRESSURE: 59 MMHG | WEIGHT: 238 LBS | BODY MASS INDEX: 44.93 KG/M2

## 2022-08-12 DIAGNOSIS — C44.229 SQUAMOUS CELL CARCINOMA OF SKIN OF LEFT EAR AND EXTERNAL AURICULAR CANAL: ICD-10-CM

## 2022-08-12 PROCEDURE — 99214 OFFICE O/P EST MOD 30 MIN: CPT

## 2022-08-12 RX ORDER — MUPIROCIN 20 MG/G
2 OINTMENT TOPICAL
Qty: 22 | Refills: 0 | Status: COMPLETED | COMMUNITY
Start: 2021-08-21 | End: 2022-08-12

## 2022-08-12 RX ORDER — INSULIN ASPART 100 [IU]/ML
INJECTION, SOLUTION INTRAVENOUS; SUBCUTANEOUS
Refills: 0 | Status: ACTIVE | COMMUNITY

## 2022-08-12 NOTE — PHYSICAL EXAM
[Midline] : trachea located in midline position [Normal] : no rashes [de-identified] : Large mass of the L mastoid and upper neck area which fluctuates and feels like a cystic mass. [de-identified] : Mild crusting of the L skin around the upper portion of the ear wound.

## 2022-08-12 NOTE — HISTORY OF PRESENT ILLNESS
[de-identified] : Former patient of Dr. Park, tranferring to Dr. Bai for convenience of location in [place].  71 year old female with history of SCC of the external ear. s/p Left auriculectomy, left parotidectomy, left neck dissection, reconstruction using sternocleidomastoid muscle flap, left supraclavicular island flap, repair of donor site using local tissue advancement measuring approximately 50 sq cm _ and s/p Left temporal bone resection with facial nerve monitoring (Dr. Gil) both completed 11/03/2021.\par Pathology:\par Final Diagnosis\par Left auriculectomy with lateral temporal bone resection left parotidectomy\par and neck dissection level 2-5\par - Squamous cell carcinoma, moderately to poorly differentiated,\par \par infiltrating\par - 4.3 cm in greatest dimension, invading cartilage\par - Perineural invasion seen, nerve fiber 0.3 mm in greatest diameter,\par multifocal\par - Suspicious focus of vascular invasion seen\par - Benign parotid gland tissue with chronic inflammation and atrophy\par - Margins negative for carcinoma\par - Bone tissue negative for carcinoma\par - Thirty-nine negative lymph nodes\par \par Pt did not f/up with Dr. Park and Dr. Coats.  Now pt has new mass on L neck since July 14, 2022.  No bleeding.

## 2022-08-18 ENCOUNTER — APPOINTMENT (OUTPATIENT)
Dept: CT IMAGING | Facility: CLINIC | Age: 72
End: 2022-08-18

## 2022-08-18 ENCOUNTER — OUTPATIENT (OUTPATIENT)
Dept: OUTPATIENT SERVICES | Facility: HOSPITAL | Age: 72
LOS: 1 days | End: 2022-08-18
Payer: MEDICARE

## 2022-08-18 DIAGNOSIS — C44.229 SQUAMOUS CELL CARCINOMA OF SKIN OF LEFT EAR AND EXTERNAL AURICULAR CANAL: ICD-10-CM

## 2022-08-18 PROCEDURE — 70491 CT SOFT TISSUE NECK W/DYE: CPT | Mod: 26

## 2022-08-18 PROCEDURE — 70481 CT ORBIT/EAR/FOSSA W/DYE: CPT | Mod: 26

## 2022-08-18 PROCEDURE — 70481 CT ORBIT/EAR/FOSSA W/DYE: CPT

## 2022-08-18 PROCEDURE — 70491 CT SOFT TISSUE NECK W/DYE: CPT

## 2022-10-24 ENCOUNTER — APPOINTMENT (OUTPATIENT)
Dept: OTOLARYNGOLOGY | Facility: CLINIC | Age: 72
End: 2022-10-24

## 2023-11-07 NOTE — PATIENT PROFILE ADULT - NSPROMEDSBROUGHTTOHOSP_GEN_A_NUR
no
Initiate Treatment: Valtrex 1 gram tablet TID\\nQuantity: 21.0 Tablet  Days Supply: 7\\nSig: Take one pill three times a day for 7 days.
Detail Level: Zone

## 2023-12-12 NOTE — ED ADULT NURSE NOTE - OBJECTIVE STATEMENT
72 yo F AAOx4 received to room 4, xfer from [facility] for ENT consult, noted necrosis to L ear, pt states progressively getting worse over past few months, possibly "needing sx to remove it" due to malignancy, no changes in sound, arrives with #22 to L arm, lab work sent, pending ENT bedside eval No

## 2023-12-21 NOTE — PROGRESS NOTE ADULT - PROBLEM SELECTOR PLAN 6
- c/w synthroid  - TSH 3.59, freeT4 1.2, wnl
[FreeTextEntry1] : 85-year-old male with past medical history of ischemic cardiomyopathy status post ICD comes in accompanied by his wife for evaluation of chest discomfort.  Patient reports months of exertional chest discomfort relieved with rest no associated shortness of breath PND orthopnea.
- c/w statin
- c/w synthroid  - TSH 3.59, freeT4 1.2, wnl
- c/w statin
- c/w statin
- c/w synthroid  - TSH 3.59, freeT4 1.2, wnl
- c/w statin